# Patient Record
Sex: FEMALE | Race: WHITE | NOT HISPANIC OR LATINO | Employment: UNEMPLOYED | URBAN - METROPOLITAN AREA
[De-identification: names, ages, dates, MRNs, and addresses within clinical notes are randomized per-mention and may not be internally consistent; named-entity substitution may affect disease eponyms.]

---

## 2018-10-19 ENCOUNTER — CONVERSION ENCOUNTER (OUTPATIENT)
Dept: GENERAL RADIOLOGY | Facility: HOSPITAL | Age: 33
End: 2018-10-19

## 2020-04-22 ENCOUNTER — HOSPITAL ENCOUNTER (OUTPATIENT)
Dept: GENERAL RADIOLOGY | Facility: HOSPITAL | Age: 35
Discharge: HOME | End: 2020-04-22
Attending: NURSE PRACTITIONER

## 2020-09-15 ENCOUNTER — HOSPITAL ENCOUNTER (OUTPATIENT)
Dept: URGENT CARE | Facility: CLINIC | Age: 35
Discharge: HOME | End: 2020-09-15

## 2020-09-17 LAB — BACTERIA SPEC AEROBE CULT: NORMAL

## 2021-12-02 ENCOUNTER — TRANSCRIBE ORDERS (OUTPATIENT)
Dept: ADMINISTRATIVE | Facility: HOSPITAL | Age: 36
End: 2021-12-02

## 2021-12-02 DIAGNOSIS — N63.20 LEFT BREAST LUMP: ICD-10-CM

## 2021-12-02 DIAGNOSIS — Z12.31 BREAST CANCER SCREENING BY MAMMOGRAM: Primary | ICD-10-CM

## 2022-01-14 ENCOUNTER — TRANSCRIBE ORDERS (OUTPATIENT)
Dept: ADMINISTRATIVE | Facility: HOSPITAL | Age: 37
End: 2022-01-14

## 2022-01-14 ENCOUNTER — HOSPITAL ENCOUNTER (OUTPATIENT)
Dept: MAMMOGRAPHY | Facility: HOSPITAL | Age: 37
Discharge: HOME | End: 2022-01-14

## 2022-01-14 ENCOUNTER — HOSPITAL ENCOUNTER (OUTPATIENT)
Dept: ULTRASOUND IMAGING | Facility: HOSPITAL | Age: 37
Discharge: HOME | End: 2022-01-14

## 2022-01-14 DIAGNOSIS — N63.10 BREAST MASS, RIGHT: Primary | ICD-10-CM

## 2022-01-14 DIAGNOSIS — Z12.31 BREAST CANCER SCREENING BY MAMMOGRAM: ICD-10-CM

## 2022-01-14 DIAGNOSIS — N63.20 LEFT BREAST LUMP: ICD-10-CM

## 2022-01-14 DIAGNOSIS — N63.20 LEFT BREAST MASS: Primary | ICD-10-CM

## 2022-01-14 PROCEDURE — 77066 DX MAMMO INCL CAD BI: CPT

## 2022-01-14 PROCEDURE — G0279 TOMOSYNTHESIS, MAMMO: HCPCS

## 2022-01-14 PROCEDURE — 76642 ULTRASOUND BREAST LIMITED: CPT

## 2022-01-25 ENCOUNTER — HOSPITAL ENCOUNTER (EMERGENCY)
Facility: HOSPITAL | Age: 37
Discharge: HOME | End: 2022-01-25
Attending: EMERGENCY MEDICINE | Admitting: EMERGENCY MEDICINE

## 2022-01-25 ENCOUNTER — APPOINTMENT (OUTPATIENT)
Dept: GENERAL RADIOLOGY | Facility: HOSPITAL | Age: 37
End: 2022-01-25

## 2022-01-25 VITALS
RESPIRATION RATE: 18 BRPM | HEART RATE: 67 BPM | SYSTOLIC BLOOD PRESSURE: 115 MMHG | OXYGEN SATURATION: 96 % | TEMPERATURE: 98.3 F | WEIGHT: 142.2 LBS | DIASTOLIC BLOOD PRESSURE: 79 MMHG | BODY MASS INDEX: 23.69 KG/M2 | HEIGHT: 65 IN

## 2022-01-25 DIAGNOSIS — B34.9 VIRAL SYNDROME: Primary | ICD-10-CM

## 2022-01-25 LAB
BACTERIA UR QL AUTO: ABNORMAL /HPF
BILIRUB UR QL STRIP: NEGATIVE
CLARITY UR: CLEAR
COLOR UR: YELLOW
FLUAV AG NPH QL: NEGATIVE
FLUBV AG NPH QL IA: NEGATIVE
GLUCOSE UR STRIP-MCNC: NEGATIVE MG/DL
HGB UR QL STRIP.AUTO: ABNORMAL
HOLD SPECIMEN: NORMAL
HOLD SPECIMEN: NORMAL
HYALINE CASTS UR QL AUTO: ABNORMAL /LPF
KETONES UR QL STRIP: NEGATIVE
LEUKOCYTE ESTERASE UR QL STRIP.AUTO: NEGATIVE
NITRITE UR QL STRIP: NEGATIVE
PH UR STRIP.AUTO: 6.5 [PH] (ref 5–8)
PROT UR QL STRIP: NEGATIVE
RBC # UR STRIP: ABNORMAL /HPF
REF LAB TEST METHOD: ABNORMAL
S PYO AG THROAT QL: NEGATIVE
SP GR UR STRIP: 1.01 (ref 1–1.03)
SQUAMOUS #/AREA URNS HPF: ABNORMAL /HPF
UROBILINOGEN UR QL STRIP: ABNORMAL
WBC # UR STRIP: ABNORMAL /HPF
WHOLE BLOOD HOLD SPECIMEN: NORMAL
WHOLE BLOOD HOLD SPECIMEN: NORMAL

## 2022-01-25 PROCEDURE — 71045 X-RAY EXAM CHEST 1 VIEW: CPT

## 2022-01-25 PROCEDURE — 87804 INFLUENZA ASSAY W/OPTIC: CPT | Performed by: NURSE PRACTITIONER

## 2022-01-25 PROCEDURE — 81001 URINALYSIS AUTO W/SCOPE: CPT | Performed by: NURSE PRACTITIONER

## 2022-01-25 PROCEDURE — U0004 COV-19 TEST NON-CDC HGH THRU: HCPCS | Performed by: NURSE PRACTITIONER

## 2022-01-25 PROCEDURE — 36415 COLL VENOUS BLD VENIPUNCTURE: CPT

## 2022-01-25 PROCEDURE — 99283 EMERGENCY DEPT VISIT LOW MDM: CPT

## 2022-01-25 PROCEDURE — 87880 STREP A ASSAY W/OPTIC: CPT

## 2022-01-25 PROCEDURE — 87081 CULTURE SCREEN ONLY: CPT | Performed by: EMERGENCY MEDICINE

## 2022-01-25 RX ORDER — BROMPHENIRAMINE MALEATE, PSEUDOEPHEDRINE HYDROCHLORIDE, AND DEXTROMETHORPHAN HYDROBROMIDE 2; 30; 10 MG/5ML; MG/5ML; MG/5ML
10 SYRUP ORAL 4 TIMES DAILY PRN
Qty: 473 ML | Refills: 0 | Status: SHIPPED | OUTPATIENT
Start: 2022-01-25 | End: 2022-09-16

## 2022-01-25 RX ORDER — TRAMADOL HYDROCHLORIDE 50 MG/1
50 TABLET ORAL ONCE
Status: COMPLETED | OUTPATIENT
Start: 2022-01-25 | End: 2022-01-25

## 2022-01-25 RX ORDER — SPIRONOLACTONE 100 MG/1
100 TABLET, FILM COATED ORAL EVERY 24 HOURS
COMMUNITY
End: 2023-04-05

## 2022-01-25 RX ORDER — IBUPROFEN 600 MG/1
600 TABLET ORAL EVERY 8 HOURS PRN
Qty: 30 TABLET | Refills: 0 | Status: SHIPPED | OUTPATIENT
Start: 2022-01-25 | End: 2022-09-16

## 2022-01-25 RX ORDER — TRAMADOL HYDROCHLORIDE 50 MG/1
50 TABLET ORAL ONCE
Status: CANCELLED | OUTPATIENT
Start: 2022-01-25 | End: 2022-01-25

## 2022-01-25 RX ORDER — SODIUM CHLORIDE 0.9 % (FLUSH) 0.9 %
10 SYRINGE (ML) INJECTION AS NEEDED
Status: DISCONTINUED | OUTPATIENT
Start: 2022-01-25 | End: 2022-01-26 | Stop reason: HOSPADM

## 2022-01-25 RX ADMIN — TRAMADOL HYDROCHLORIDE 50 MG: 50 TABLET ORAL at 21:08

## 2022-01-26 LAB — SARS-COV-2 RNA PNL SPEC NAA+PROBE: DETECTED

## 2022-01-26 NOTE — DISCHARGE INSTRUCTIONS
All of your testing here today was negative for any acute findings.  Your symptoms are likely viral so just requires symptomatic treatment.  Covid test is pending and you will be notified if you get a positive result.  All results may be viewed at [facility] K121 online  Medications as prescribed  Rest  Alternate Tylenol/motrin for pain or fever  Drink plenty fluids  Follow up with pcp as needed  Go to ED for soa, or worsening symptoms

## 2022-01-27 ENCOUNTER — TRANSCRIBE ORDERS (OUTPATIENT)
Dept: ADMINISTRATIVE | Facility: HOSPITAL | Age: 37
End: 2022-01-27

## 2022-01-27 DIAGNOSIS — N63.20 LEFT BREAST MASS: Primary | ICD-10-CM

## 2022-01-27 LAB — BACTERIA SPEC AEROBE CULT: NORMAL

## 2022-02-01 ENCOUNTER — HOSPITAL ENCOUNTER (OUTPATIENT)
Dept: MAMMOGRAPHY | Facility: HOSPITAL | Age: 37
Discharge: HOME | End: 2022-02-01

## 2022-02-01 ENCOUNTER — DOCUMENTATION (OUTPATIENT)
Dept: MAMMOGRAPHY | Facility: HOSPITAL | Age: 37
End: 2022-02-01

## 2022-02-01 DIAGNOSIS — N63.20 LEFT BREAST MASS: ICD-10-CM

## 2022-02-01 PROCEDURE — 88305 TISSUE EXAM BY PATHOLOGIST: CPT | Performed by: PHYSICIAN ASSISTANT

## 2022-02-01 PROCEDURE — 0 LIDOCAINE 1 % SOLUTION: Performed by: PHYSICIAN ASSISTANT

## 2022-02-01 RX ORDER — LIDOCAINE HYDROCHLORIDE 10 MG/ML
10 INJECTION, SOLUTION INFILTRATION; PERINEURAL ONCE
Status: COMPLETED | OUTPATIENT
Start: 2022-02-01 | End: 2022-02-01

## 2022-02-01 RX ORDER — LIDOCAINE HYDROCHLORIDE AND EPINEPHRINE 10; 10 MG/ML; UG/ML
10 INJECTION, SOLUTION INFILTRATION; PERINEURAL ONCE
Status: COMPLETED | OUTPATIENT
Start: 2022-02-01 | End: 2022-02-01

## 2022-02-01 RX ADMIN — LIDOCAINE HYDROCHLORIDE 10 ML: 10 INJECTION, SOLUTION INFILTRATION; PERINEURAL at 14:04

## 2022-02-01 RX ADMIN — LIDOCAINE HYDROCHLORIDE,EPINEPHRINE BITARTRATE 10 ML: 10; .01 INJECTION, SOLUTION INFILTRATION; PERINEURAL at 14:04

## 2022-02-01 RX ADMIN — LIDOCAINE HYDROCHLORIDE 10 ML: 10 INJECTION, SOLUTION INFILTRATION; PERINEURAL at 14:06

## 2022-02-01 RX ADMIN — LIDOCAINE HYDROCHLORIDE,EPINEPHRINE BITARTRATE 10 ML: 10; .01 INJECTION, SOLUTION INFILTRATION; PERINEURAL at 14:06

## 2022-02-02 LAB
CYTO UR: NORMAL
LAB AP CASE REPORT: NORMAL
LAB AP CLINICAL INFORMATION: NORMAL
PATH REPORT.FINAL DX SPEC: NORMAL
PATH REPORT.GROSS SPEC: NORMAL

## 2022-02-03 ENCOUNTER — DOCUMENTATION (OUTPATIENT)
Dept: MAMMOGRAPHY | Facility: HOSPITAL | Age: 37
End: 2022-02-03

## 2022-02-03 NOTE — PROGRESS NOTES
PT CALLED AND ASKED ABOUT HER PATHOLOGY RESULTS THAT SHE SAW ON HER MYCHART. I EXPLAINED WHAT IT SAID AND REPORTED TO HER THAT THE RADIOLOGIST WILL CONFIRM THE DIAGNOSIS WITH AN ADDENDUM AND WILL GIVE RECOMMENDATIONS AND SHE V/U. I ADVISED HER TO F/U WITH HER DOCTOR AND SHE V/U

## 2022-02-08 NOTE — PROGRESS NOTES
S/W PATIENT AFTER HER BIOPSY ON 2/1/22 AND DISCUSSED DISCHARGE INSTRUCTIONS WITH ICE PACK FOR PAIN CONTROL AND PT V/U. PT REPORTED THAT HER LAST MAMMOGRAM WAS SEVERAL YEARS AGO. SHE STATED THAT SHE DID FEEL SOMETHING AND THAT IS WHAT PROMPTED HER TO GET A MAMMOGRAM. THE ONLY COMPLAINT SHE HAS WITH HER BREAST IS SORENESS. PT REPORTED THAT SHE WAS 19 WITH HER FIRST CHILD AND 14 WITH FIRST PERIOD AND HAS NOT GONE THROUGH MENOPAUSE. PT REPORTED THAT HER 2ND COUSIN HAD BREAST CANCER IN HER 50S, MOM HAD GALLBLADDER CANCER, PATERNAL GRANDFATHER HAD COLON CANCER AND MATERNAL GRANDFATHER HAD BONE CANCER. I GAVE PT MY CONTACT INFORMATION AND ENCOURAGED HER TO CALL WITH ANY QUESTIONS OR CONCERNS AND SHE V/U

## 2022-02-23 ENCOUNTER — APPOINTMENT (OUTPATIENT)
Dept: ULTRASOUND IMAGING | Facility: HOSPITAL | Age: 37
End: 2022-02-23

## 2022-02-23 ENCOUNTER — APPOINTMENT (OUTPATIENT)
Dept: MAMMOGRAPHY | Facility: HOSPITAL | Age: 37
End: 2022-02-23

## 2022-03-22 ENCOUNTER — TRANSCRIBE ORDERS (OUTPATIENT)
Dept: ADMINISTRATIVE | Facility: HOSPITAL | Age: 37
End: 2022-03-22

## 2022-03-22 DIAGNOSIS — N63.20 LEFT BREAST LUMP: Primary | ICD-10-CM

## 2022-03-22 DIAGNOSIS — N63.20 MASS OF LEFT BREAST: Primary | ICD-10-CM

## 2022-06-06 ENCOUNTER — APPOINTMENT (OUTPATIENT)
Dept: ULTRASOUND IMAGING | Facility: HOSPITAL | Age: 37
End: 2022-06-06

## 2022-06-30 ENCOUNTER — DOCUMENTATION (OUTPATIENT)
Dept: MAMMOGRAPHY | Facility: HOSPITAL | Age: 37
End: 2022-06-30

## 2022-07-06 ENCOUNTER — APPOINTMENT (OUTPATIENT)
Dept: ULTRASOUND IMAGING | Facility: HOSPITAL | Age: 37
End: 2022-07-06

## 2022-07-12 ENCOUNTER — HOSPITAL ENCOUNTER (OUTPATIENT)
Dept: MAMMOGRAPHY | Facility: HOSPITAL | Age: 37
End: 2022-07-12

## 2022-07-12 ENCOUNTER — APPOINTMENT (OUTPATIENT)
Dept: ULTRASOUND IMAGING | Facility: HOSPITAL | Age: 37
End: 2022-07-12

## 2022-07-27 ENCOUNTER — HOSPITAL ENCOUNTER (OUTPATIENT)
Dept: MAMMOGRAPHY | Facility: HOSPITAL | Age: 37
Discharge: HOME | End: 2022-07-27

## 2022-07-27 ENCOUNTER — HOSPITAL ENCOUNTER (OUTPATIENT)
Dept: ULTRASOUND IMAGING | Facility: HOSPITAL | Age: 37
Discharge: HOME | End: 2022-07-27

## 2022-07-27 DIAGNOSIS — N63.20 LEFT BREAST LUMP: ICD-10-CM

## 2022-07-27 DIAGNOSIS — N63.20 LEFT BREAST MASS: ICD-10-CM

## 2022-07-27 PROCEDURE — 76642 ULTRASOUND BREAST LIMITED: CPT

## 2022-07-27 PROCEDURE — 77065 DX MAMMO INCL CAD UNI: CPT

## 2022-07-27 PROCEDURE — G0279 TOMOSYNTHESIS, MAMMO: HCPCS

## 2022-08-01 ENCOUNTER — APPOINTMENT (OUTPATIENT)
Dept: ULTRASOUND IMAGING | Facility: HOSPITAL | Age: 37
End: 2022-08-01

## 2022-08-01 ENCOUNTER — APPOINTMENT (OUTPATIENT)
Dept: MAMMOGRAPHY | Facility: HOSPITAL | Age: 37
End: 2022-08-01

## 2022-09-08 ENCOUNTER — OFFICE VISIT (OUTPATIENT)
Dept: SURGERY | Facility: CLINIC | Age: 37
End: 2022-09-08

## 2022-09-08 VITALS — RESPIRATION RATE: 16 BRPM | BODY MASS INDEX: 25.33 KG/M2 | WEIGHT: 152 LBS | HEIGHT: 65 IN

## 2022-09-08 DIAGNOSIS — N63.20 MASS OF LEFT BREAST, UNSPECIFIED QUADRANT: Primary | ICD-10-CM

## 2022-09-08 PROCEDURE — 99204 OFFICE O/P NEW MOD 45 MIN: CPT | Performed by: SURGERY

## 2022-09-08 NOTE — H&P (VIEW-ONLY)
Chief Complaint: Breast Mass    Subjective         History of Present Illness  Andreina Barnes is a 37 y.o. female presents to [facility] SURGERY to be seen for left breast mass that has been enlarging on imaging and is painful to the patient.  Her imaging is shown below:      Narrative & Impression   PROCEDURE:  MAMMO DIAGNOSTIC DIGITAL TOMOSYNTHESIS LEFT W CAD, 7/27/2022, 14:16  US BREAST LEFT LIMITED, 7/27/2022, 14:11     INDICATIONS:  36-year-old woman status post bilateral breast ultrasound in February 2022 that showed bilateral fibroadenomas.  She reports that her left breast lump feels larger and is more painful.  She was also recommended for short-term follow-up left breast cystic mass as recommended on   1/14/2022 report.     FINDINGS:          Mammogram:    Corresponding to the patient's symptom of left breast lump, the oval, circumscribed, oval mass in   the upper-outer left breast, middle to posterior depth, measures 1.8 cm, previously 1.7 cm.    Oval, circumscribed, oval mass in the upper inner left breast, anterior depth, is stable in size at   8 mm.      Ultrasound:    Corresponding to the patient's symptom of enlarging, painful left breast lump at 12:00, 8 cm from   the nipple, there is a cluster of 3 oval, circumscribed, hypoechoic, solid masses that measure a   total of 1.6 x 0.9 x 1.0 cm, previously 1.2 x 0.7 x 1.2 cm.   Corresponding to the smaller mammographic mass, there is a benign cyst that measures 7 x 4 x 5 mm at 10:00, 4 cm from the nipple, previously 8 x 5 x 5 mm.      IMPRESSION:  Corresponding to the patient's symptom of enlarging, painful left breast lump at 12:00, 8 cm from   the nipple, the patient's biopsy-proven fibroadenoma has mildly increased in size.  Given the   patient's symptoms, recommend surgical consultation.     Stable benign left breast cyst.     I discussed results with the patient following the exam.     RECOMMENDATION(S):              "  CLINICAL EVALUATION.       SURGICAL CONSULTATION.           Objective     Past Medical History:   Diagnosis Date   • Hormone imbalance        Past Surgical History:   Procedure Laterality Date   • APPENDECTOMY     • TONSILLECTOMY           Current Outpatient Medications:   •  spironolactone (ALDACTONE) 100 MG tablet, Take 100 mg by mouth Daily., Disp: , Rfl:   •  brompheniramine-pseudoephedrine-DM 30-2-10 MG/5ML syrup, Take 10 mL by mouth 4 (Four) Times a Day As Needed for Allergies., Disp: 473 mL, Rfl: 0  •  ibuprofen (ADVIL,MOTRIN) 600 MG tablet, Take 1 tablet by mouth Every 8 (Eight) Hours As Needed for Mild Pain ., Disp: 30 tablet, Rfl: 0    Allergies   Allergen Reactions   • Morphine Other (See Comments) and Unknown - High Severity   • Penicillin G Unknown - High Severity   • Penicillin V Other (See Comments)   • Amoxicillin Rash, Other (See Comments) and Unknown - High Severity     rash        History reviewed. No pertinent family history.    Social History     Socioeconomic History   • Marital status:    Tobacco Use   • Smoking status: Current Some Day Smoker   • Smokeless tobacco: Never Used   Vaping Use   • Vaping Use: Every day   • Substances: Nicotine   • Devices: Disposable   Substance and Sexual Activity   • Alcohol use: Never   • Drug use: Never   • Sexual activity: Defer       Vital Signs:   Resp 16   Ht 165.1 cm (65\")   Wt 68.9 kg (152 lb)   BMI 25.29 kg/m²    Review of Systems    Physical Exam  Vitals and nursing note reviewed.   Constitutional:       Appearance: Normal appearance.   HENT:      Head: Normocephalic and atraumatic.   Eyes:      Extraocular Movements: Extraocular movements intact.      Pupils: Pupils are equal, round, and reactive to light.   Cardiovascular:      Pulses: Normal pulses.   Pulmonary:      Effort: Pulmonary effort is normal. No accessory muscle usage or respiratory distress.   Chest:   Breasts:      Right: Normal. No inverted nipple, nipple discharge, skin " change, axillary adenopathy or supraclavicular adenopathy.      Left: Mass present. No inverted nipple, nipple discharge, skin change, axillary adenopathy or supraclavicular adenopathy.       Abdominal:      General: Abdomen is flat.      Palpations: Abdomen is soft.      Tenderness: There is no abdominal tenderness. There is no guarding.   Musculoskeletal:         General: No swelling, tenderness or deformity.      Cervical back: Neck supple.   Lymphadenopathy:      Upper Body:      Right upper body: No supraclavicular or axillary adenopathy.      Left upper body: No supraclavicular or axillary adenopathy.   Skin:     General: Skin is warm and dry.   Neurological:      General: No focal deficit present.      Mental Status: She is alert and oriented to person, place, and time.   Psychiatric:         Mood and Affect: Mood normal.         Thought Content: Thought content normal.          Result Review :               Assessment and Plan    Diagnoses and all orders for this visit:    1. Mass of left breast, unspecified quadrant (Primary)  -     Case Request; Standing  -     Case Request    Will plan on left breast mass excision on 9/20/22    Follow Up   Return for Next scheduled followup after surgery.  Patient was given instructions and counseling regarding her condition or for health maintenance advice. Please see specific information pulled into the AVS if appropriate.         This document has been electronically signed by Savana Gaitan MD  September 8, 2022 11:11 EDT

## 2022-09-08 NOTE — PROGRESS NOTES
Chief Complaint: Breast Mass    Subjective         History of Present Illness  Andreina Barnes is a 37 y.o. female presents to [facility] GENER[facility] SURGERY to be seen for left breast mass that has been enlarging on imaging and is painful to the patient.  Her imaging is shown below:      Narrative & Impression   PROCEDURE:  MAMMO DIAGNOSTIC DIGITAL TOMOSYNTHESIS LEFT W CAD, 7/27/2022, 14:16  US BREAST LEFT LIMITED, 7/27/2022, 14:11     INDICATIONS:  36-year-old woman status post bilateral breast ultrasound in February 2022 that showed bilateral fibroadenomas.  She reports that her left breast lump feels larger and is more painful.  She was also recommended for short-term follow-up left breast cystic mass as recommended on   1/14/2022 report.     FINDINGS:          Mammogram:    Corresponding to the patient's symptom of left breast lump, the oval, circumscribed, oval mass in   the upper-outer left breast, middle to posterior depth, measures 1.8 cm, previously 1.7 cm.    Oval, circumscribed, oval mass in the upper inner left breast, anterior depth, is stable in size at   8 mm.      Ultrasound:    Corresponding to the patient's symptom of enlarging, painful left breast lump at 12:00, 8 cm from   the nipple, there is a cluster of 3 oval, circumscribed, hypoechoic, solid masses that measure a   total of 1.6 x 0.9 x 1.0 cm, previously 1.2 x 0.7 x 1.2 cm.   Corresponding to the smaller mammographic mass, there is a benign cyst that measures 7 x 4 x 5 mm at 10:00, 4 cm from the nipple, previously 8 x 5 x 5 mm.      IMPRESSION:  Corresponding to the patient's symptom of enlarging, painful left breast lump at 12:00, 8 cm from   the nipple, the patient's biopsy-proven fibroadenoma has mildly increased in size.  Given the   patient's symptoms, recommend surgical consultation.     Stable benign left breast cyst.     I discussed results with the patient following the exam.     RECOMMENDATION(S):              "  CLINICAL EVALUATION.       SURGICAL CONSULTATION.           Objective     Past Medical History:   Diagnosis Date   • Hormone imbalance        Past Surgical History:   Procedure Laterality Date   • APPENDECTOMY     • TONSILLECTOMY           Current Outpatient Medications:   •  spironolactone (ALDACTONE) 100 MG tablet, Take 100 mg by mouth Daily., Disp: , Rfl:   •  brompheniramine-pseudoephedrine-DM 30-2-10 MG/5ML syrup, Take 10 mL by mouth 4 (Four) Times a Day As Needed for Allergies., Disp: 473 mL, Rfl: 0  •  ibuprofen (ADVIL,MOTRIN) 600 MG tablet, Take 1 tablet by mouth Every 8 (Eight) Hours As Needed for Mild Pain ., Disp: 30 tablet, Rfl: 0    Allergies   Allergen Reactions   • Morphine Other (See Comments) and Unknown - High Severity   • Penicillin G Unknown - High Severity   • Penicillin V Other (See Comments)   • Amoxicillin Rash, Other (See Comments) and Unknown - High Severity     rash        History reviewed. No pertinent family history.    Social History     Socioeconomic History   • Marital status:    Tobacco Use   • Smoking status: Current Some Day Smoker   • Smokeless tobacco: Never Used   Vaping Use   • Vaping Use: Every day   • Substances: Nicotine   • Devices: Disposable   Substance and Sexual Activity   • Alcohol use: Never   • Drug use: Never   • Sexual activity: Defer       Vital Signs:   Resp 16   Ht 165.1 cm (65\")   Wt 68.9 kg (152 lb)   BMI 25.29 kg/m²    Review of Systems    Physical Exam  Vitals and nursing note reviewed.   Constitutional:       Appearance: Normal appearance.   HENT:      Head: Normocephalic and atraumatic.   Eyes:      Extraocular Movements: Extraocular movements intact.      Pupils: Pupils are equal, round, and reactive to light.   Cardiovascular:      Pulses: Normal pulses.   Pulmonary:      Effort: Pulmonary effort is normal. No accessory muscle usage or respiratory distress.   Chest:   Breasts:      Right: Normal. No inverted nipple, nipple discharge, skin " change, axillary adenopathy or supraclavicular adenopathy.      Left: Mass present. No inverted nipple, nipple discharge, skin change, axillary adenopathy or supraclavicular adenopathy.       Abdominal:      General: Abdomen is flat.      Palpations: Abdomen is soft.      Tenderness: There is no abdominal tenderness. There is no guarding.   Musculoskeletal:         General: No swelling, tenderness or deformity.      Cervical back: Neck supple.   Lymphadenopathy:      Upper Body:      Right upper body: No supraclavicular or axillary adenopathy.      Left upper body: No supraclavicular or axillary adenopathy.   Skin:     General: Skin is warm and dry.   Neurological:      General: No focal deficit present.      Mental Status: She is alert and oriented to person, place, and time.   Psychiatric:         Mood and Affect: Mood normal.         Thought Content: Thought content normal.          Result Review :               Assessment and Plan    Diagnoses and all orders for this visit:    1. Mass of left breast, unspecified quadrant (Primary)  -     Case Request; Standing  -     Case Request    Will plan on left breast mass excision on 9/20/22    Follow Up   Return for Next scheduled followup after surgery.  Patient was given instructions and counseling regarding her condition or for health maintenance advice. Please see specific information pulled into the AVS if appropriate.         This document has been electronically signed by Savana Gaitan MD  September 8, 2022 11:11 EDT

## 2022-09-16 NOTE — PRE-PROCEDURE INSTRUCTIONS
PATIENT INSTRUCTED TO BE:    - NPO AFTER MIDNIGHT EXCEPT CAN HAVE CLEAR LIQUIDS 2 HOURS PRIOR TO SURGERY ARRIVAL TIME     - TO HOLD ALL VITAMINS, SUPPLEMENTS, NSAIDS FOR ONE WEEK PRIOR TO THEIR SURGICAL PROCEDURE    - INSTRUCTED PT TO USE SURGICAL SOAP 1 TIME THE NIGHT PRIOR TO SURGERY OR THE AM OF SURGERY.   USE SOAP FROM NECK TO TOES AVOID THEIR FACE, HAIR, AND PRIVATE PARTS. INSTRUCTED NO LOTIONS, JEWELRY, PIERCINGS, OR DEODORANT DAY OF SURGERY    - IF DIABETIC, CHECK BLOOD GLUCOSE IF LESS THAN 70 CALL PREOP AREA -AM OF SURGERY     - INSTRUCTED TO TAKE THE FOLLOWING MEDICATIONS THE DAY OF SURGERY:         NONE     PATIENT VERBALIZED UNDERSTANDING

## 2022-09-20 ENCOUNTER — ANESTHESIA (OUTPATIENT)
Dept: PERIOP | Facility: HOSPITAL | Age: 37
End: 2022-09-20

## 2022-09-20 ENCOUNTER — HOSPITAL ENCOUNTER (OUTPATIENT)
Facility: HOSPITAL | Age: 37
Setting detail: HOSPITAL OUTPATIENT SURGERY
Discharge: HOME | End: 2022-09-20
Attending: SURGERY | Admitting: SURGERY

## 2022-09-20 ENCOUNTER — ANESTHESIA EVENT (OUTPATIENT)
Dept: PERIOP | Facility: HOSPITAL | Age: 37
End: 2022-09-20

## 2022-09-20 VITALS
RESPIRATION RATE: 20 BRPM | HEART RATE: 88 BPM | DIASTOLIC BLOOD PRESSURE: 80 MMHG | SYSTOLIC BLOOD PRESSURE: 134 MMHG | BODY MASS INDEX: 25.71 KG/M2 | TEMPERATURE: 97.1 F | OXYGEN SATURATION: 99 % | WEIGHT: 154.32 LBS | HEIGHT: 65 IN

## 2022-09-20 DIAGNOSIS — N63.20 MASS OF LEFT BREAST, UNSPECIFIED QUADRANT: ICD-10-CM

## 2022-09-20 LAB — B-HCG UR QL: NEGATIVE

## 2022-09-20 PROCEDURE — 25010000002 KETOROLAC TROMETHAMINE PER 15 MG: Performed by: NURSE ANESTHETIST, CERTIFIED REGISTERED

## 2022-09-20 PROCEDURE — 25010000002 FENTANYL CITRATE (PF) 50 MCG/ML SOLUTION: Performed by: NURSE ANESTHETIST, CERTIFIED REGISTERED

## 2022-09-20 PROCEDURE — 25010000002 PROPOFOL 10 MG/ML EMULSION: Performed by: NURSE ANESTHETIST, CERTIFIED REGISTERED

## 2022-09-20 PROCEDURE — 88305 TISSUE EXAM BY PATHOLOGIST: CPT | Performed by: SURGERY

## 2022-09-20 PROCEDURE — 25010000002 GENTAMICIN PER 80 MG: Performed by: SURGERY

## 2022-09-20 PROCEDURE — 25010000002 DEXAMETHASONE PER 1 MG: Performed by: NURSE ANESTHETIST, CERTIFIED REGISTERED

## 2022-09-20 PROCEDURE — 81025 URINE PREGNANCY TEST: CPT | Performed by: ANESTHESIOLOGY

## 2022-09-20 PROCEDURE — 25010000002 MIDAZOLAM PER 1 MG: Performed by: ANESTHESIOLOGY

## 2022-09-20 PROCEDURE — 25010000002 ONDANSETRON PER 1 MG: Performed by: NURSE ANESTHETIST, CERTIFIED REGISTERED

## 2022-09-20 PROCEDURE — C1889 IMPLANT/INSERT DEVICE, NOC: HCPCS | Performed by: SURGERY

## 2022-09-20 PROCEDURE — 19120 REMOVAL OF BREAST LESION: CPT | Performed by: SURGERY

## 2022-09-20 DEVICE — LIGACLIP MCA MULTIPLE CLIP APPLIERS, 20 MEDIUM CLIPS
Type: IMPLANTABLE DEVICE | Site: BREAST | Status: FUNCTIONAL
Brand: LIGACLIP

## 2022-09-20 DEVICE — SEAL HEMO SURG ARISTA/AH ABS/PWDR 3GM: Type: IMPLANTABLE DEVICE | Site: BREAST | Status: FUNCTIONAL

## 2022-09-20 RX ORDER — SODIUM CHLORIDE, SODIUM LACTATE, POTASSIUM CHLORIDE, CALCIUM CHLORIDE 600; 310; 30; 20 MG/100ML; MG/100ML; MG/100ML; MG/100ML
9 INJECTION, SOLUTION INTRAVENOUS CONTINUOUS PRN
Status: DISCONTINUED | OUTPATIENT
Start: 2022-09-20 | End: 2022-09-20 | Stop reason: HOSPADM

## 2022-09-20 RX ORDER — GLYCOPYRROLATE 0.2 MG/ML
INJECTION INTRAMUSCULAR; INTRAVENOUS AS NEEDED
Status: DISCONTINUED | OUTPATIENT
Start: 2022-09-20 | End: 2022-09-20 | Stop reason: SURG

## 2022-09-20 RX ORDER — PHENYLEPHRINE HCL IN 0.9% NACL 1 MG/10 ML
SYRINGE (ML) INTRAVENOUS AS NEEDED
Status: DISCONTINUED | OUTPATIENT
Start: 2022-09-20 | End: 2022-09-20 | Stop reason: SURG

## 2022-09-20 RX ORDER — LIDOCAINE HYDROCHLORIDE 20 MG/ML
INJECTION, SOLUTION EPIDURAL; INFILTRATION; INTRACAUDAL; PERINEURAL AS NEEDED
Status: DISCONTINUED | OUTPATIENT
Start: 2022-09-20 | End: 2022-09-20 | Stop reason: SURG

## 2022-09-20 RX ORDER — DEXAMETHASONE SODIUM PHOSPHATE 4 MG/ML
INJECTION, SOLUTION INTRA-ARTICULAR; INTRALESIONAL; INTRAMUSCULAR; INTRAVENOUS; SOFT TISSUE AS NEEDED
Status: DISCONTINUED | OUTPATIENT
Start: 2022-09-20 | End: 2022-09-20 | Stop reason: SURG

## 2022-09-20 RX ORDER — ONDANSETRON 2 MG/ML
INJECTION INTRAMUSCULAR; INTRAVENOUS AS NEEDED
Status: DISCONTINUED | OUTPATIENT
Start: 2022-09-20 | End: 2022-09-20 | Stop reason: SURG

## 2022-09-20 RX ORDER — GLYCOPYRROLATE 0.2 MG/ML
0.2 INJECTION INTRAMUSCULAR; INTRAVENOUS
Status: COMPLETED | OUTPATIENT
Start: 2022-09-20 | End: 2022-09-20

## 2022-09-20 RX ORDER — KETOROLAC TROMETHAMINE 30 MG/ML
INJECTION, SOLUTION INTRAMUSCULAR; INTRAVENOUS AS NEEDED
Status: DISCONTINUED | OUTPATIENT
Start: 2022-09-20 | End: 2022-09-20 | Stop reason: SURG

## 2022-09-20 RX ORDER — ACETAMINOPHEN 500 MG
1000 TABLET ORAL ONCE
Status: COMPLETED | OUTPATIENT
Start: 2022-09-20 | End: 2022-09-20

## 2022-09-20 RX ORDER — PROMETHAZINE HYDROCHLORIDE 12.5 MG/1
25 TABLET ORAL ONCE AS NEEDED
Status: DISCONTINUED | OUTPATIENT
Start: 2022-09-20 | End: 2022-09-20 | Stop reason: HOSPADM

## 2022-09-20 RX ORDER — PROMETHAZINE HYDROCHLORIDE 25 MG/1
25 SUPPOSITORY RECTAL ONCE AS NEEDED
Status: DISCONTINUED | OUTPATIENT
Start: 2022-09-20 | End: 2022-09-20 | Stop reason: HOSPADM

## 2022-09-20 RX ORDER — DEXMEDETOMIDINE HYDROCHLORIDE 100 UG/ML
INJECTION, SOLUTION INTRAVENOUS AS NEEDED
Status: DISCONTINUED | OUTPATIENT
Start: 2022-09-20 | End: 2022-09-20 | Stop reason: SURG

## 2022-09-20 RX ORDER — HYDROCODONE BITARTRATE AND ACETAMINOPHEN 5; 325 MG/1; MG/1
1-2 TABLET ORAL EVERY 4 HOURS PRN
Qty: 20 TABLET | Refills: 0 | Status: SHIPPED | OUTPATIENT
Start: 2022-09-20 | End: 2023-04-05

## 2022-09-20 RX ORDER — MIDAZOLAM HYDROCHLORIDE 1 MG/ML
2 INJECTION INTRAMUSCULAR; INTRAVENOUS ONCE
Status: COMPLETED | OUTPATIENT
Start: 2022-09-20 | End: 2022-09-20

## 2022-09-20 RX ORDER — PROPOFOL 10 MG/ML
VIAL (ML) INTRAVENOUS AS NEEDED
Status: DISCONTINUED | OUTPATIENT
Start: 2022-09-20 | End: 2022-09-20 | Stop reason: SURG

## 2022-09-20 RX ORDER — MAGNESIUM HYDROXIDE 1200 MG/15ML
LIQUID ORAL AS NEEDED
Status: DISCONTINUED | OUTPATIENT
Start: 2022-09-20 | End: 2022-09-20 | Stop reason: HOSPADM

## 2022-09-20 RX ORDER — ONDANSETRON 4 MG/1
4 TABLET, FILM COATED ORAL ONCE AS NEEDED
Status: DISCONTINUED | OUTPATIENT
Start: 2022-09-20 | End: 2022-09-20 | Stop reason: HOSPADM

## 2022-09-20 RX ORDER — CLINDAMYCIN PHOSPHATE 900 MG/50ML
900 INJECTION INTRAVENOUS ONCE
Status: DISCONTINUED | OUTPATIENT
Start: 2022-09-20 | End: 2022-09-20 | Stop reason: HOSPADM

## 2022-09-20 RX ORDER — BUPIVACAINE HYDROCHLORIDE 2.5 MG/ML
INJECTION, SOLUTION EPIDURAL; INFILTRATION; INTRACAUDAL AS NEEDED
Status: DISCONTINUED | OUTPATIENT
Start: 2022-09-20 | End: 2022-09-20 | Stop reason: HOSPADM

## 2022-09-20 RX ORDER — GENTAMICIN SULFATE 40 MG/ML
INJECTION, SOLUTION INTRAMUSCULAR; INTRAVENOUS AS NEEDED
Status: DISCONTINUED | OUTPATIENT
Start: 2022-09-20 | End: 2022-09-20 | Stop reason: HOSPADM

## 2022-09-20 RX ORDER — FENTANYL CITRATE 50 UG/ML
INJECTION, SOLUTION INTRAMUSCULAR; INTRAVENOUS AS NEEDED
Status: DISCONTINUED | OUTPATIENT
Start: 2022-09-20 | End: 2022-09-20 | Stop reason: SURG

## 2022-09-20 RX ORDER — MEPERIDINE HYDROCHLORIDE 25 MG/ML
12.5 INJECTION INTRAMUSCULAR; INTRAVENOUS; SUBCUTANEOUS
Status: DISCONTINUED | OUTPATIENT
Start: 2022-09-20 | End: 2022-09-20 | Stop reason: HOSPADM

## 2022-09-20 RX ORDER — OXYCODONE HYDROCHLORIDE 5 MG/1
5 TABLET ORAL
Status: DISCONTINUED | OUTPATIENT
Start: 2022-09-20 | End: 2022-09-20 | Stop reason: HOSPADM

## 2022-09-20 RX ORDER — ONDANSETRON 2 MG/ML
4 INJECTION INTRAMUSCULAR; INTRAVENOUS ONCE AS NEEDED
Status: DISCONTINUED | OUTPATIENT
Start: 2022-09-20 | End: 2022-09-20 | Stop reason: HOSPADM

## 2022-09-20 RX ADMIN — Medication 50 MCG: at 07:53

## 2022-09-20 RX ADMIN — MIDAZOLAM HYDROCHLORIDE 2 MG: 1 INJECTION, SOLUTION INTRAMUSCULAR; INTRAVENOUS at 07:20

## 2022-09-20 RX ADMIN — DEXAMETHASONE SODIUM PHOSPHATE 4 MG: 4 INJECTION, SOLUTION INTRA-ARTICULAR; INTRALESIONAL; INTRAMUSCULAR; INTRAVENOUS; SOFT TISSUE at 07:37

## 2022-09-20 RX ADMIN — KETOROLAC TROMETHAMINE 30 MG: 30 INJECTION, SOLUTION INTRAMUSCULAR; INTRAVENOUS at 08:05

## 2022-09-20 RX ADMIN — DEXMEDETOMIDINE HYDROCHLORIDE 10 MCG: 100 INJECTION, SOLUTION, CONCENTRATE INTRAVENOUS at 07:54

## 2022-09-20 RX ADMIN — PROPOFOL 150 MG: 10 INJECTION, EMULSION INTRAVENOUS at 07:37

## 2022-09-20 RX ADMIN — ONDANSETRON 4 MG: 2 INJECTION INTRAMUSCULAR; INTRAVENOUS at 07:37

## 2022-09-20 RX ADMIN — FENTANYL CITRATE 50 MCG: 50 INJECTION, SOLUTION INTRAMUSCULAR; INTRAVENOUS at 07:48

## 2022-09-20 RX ADMIN — SODIUM CHLORIDE, POTASSIUM CHLORIDE, SODIUM LACTATE AND CALCIUM CHLORIDE 9 ML/HR: 600; 310; 30; 20 INJECTION, SOLUTION INTRAVENOUS at 06:55

## 2022-09-20 RX ADMIN — FENTANYL CITRATE 50 MCG: 50 INJECTION, SOLUTION INTRAMUSCULAR; INTRAVENOUS at 07:37

## 2022-09-20 RX ADMIN — GLYCOPYRROLATE 0.2 MG: 0.2 INJECTION INTRAMUSCULAR; INTRAVENOUS at 08:00

## 2022-09-20 RX ADMIN — LIDOCAINE HYDROCHLORIDE 40 MG: 20 INJECTION, SOLUTION EPIDURAL; INFILTRATION; INTRACAUDAL; PERINEURAL at 07:37

## 2022-09-20 RX ADMIN — ACETAMINOPHEN 1000 MG: 500 TABLET, FILM COATED ORAL at 06:55

## 2022-09-20 RX ADMIN — GLYCOPYRROLATE 0.2 MG: 0.2 INJECTION INTRAMUSCULAR; INTRAVENOUS at 07:20

## 2022-09-20 NOTE — OP NOTE
BREAST LUMPECTOMY  Procedure Report    Patient Name:  Andreina Barnes  YOB: 1985    Date of Surgery:  9/20/2022     Indications:  Breast mass    Pre-op Diagnosis:   Mass of left breast, unspecified quadrant [N63.20]       Post-Op Diagnosis Codes:     * Mass of left breast, unspecified quadrant [N63.20]    Procedure/CPT® Codes:  Procedure(s):  Excision of left breast mass    Staff:  Surgeon(s):  Savana Gaitan MD         Anesthesia: General    Estimated Blood Loss: minimal    Implants:    Implant Name Type Inv. Item Serial No.  Lot No. LRB No. Used Action   CLIPAPPLR M/ ENDO LIGACLIP 20CLP 11IN MD - [id number] Implant CLIPAPPLR M/ ENDO LIGACLIP 20CLP 11IN MD  [employer]  DIV [employer] J [employer] J [id number] Left 1 Implanted   SEAL HEMO SURG ROSA/AH ABS/PWDR 3GM - [id number] Implant SEAL HEMO SURG ROSA/AH ABS/PWDR 3GM  [employer] HEMOSTATIS [employer] [id number] Left 1 Implanted       Specimen:          Specimens     ID Source Type Tests Collected By Collected At Frozen?    A Breast, Left Tissue · TISSUE PATHOLOGY EXAM   Savana Gaitan MD 9/20/22 0802 No    Description: left breast mass    Comment: Short stitch superior, long stitch lateral              Findings: None    Complications: None    Description of Procedure:   The risks and benefits and treatment options were discussed with her. After informed consent was obtained, she was taken to the OR and placed supine on the table. An incision was made directly over the palpable mass and then skin flaps were raised anteriorly and the tissue was excised circumferentially around the mass. The specimen was removed and marked for orientation. It was about 2 cm in size.  The wound bed was irrigated. Hemostasis was achieved with electrocautery and clips. The deeper layers were closed with 2-0 Vicryl, the skin was closed with a 4.0 subcuticular stitch. The patietn tolerated the procedure well and will followup in 2  eder Gaitan MD     Date: 9/20/2022  Time: 08:15 EDT

## 2022-09-20 NOTE — DISCHARGE INSTRUCTIONS
DISCHARGE INSTRUCTIONS  [] Breast Biopsy  [] Lumpectomy  [] Lymph Node Dissection           For your surgery you had:  General anesthesia (you may have a sore throat for the first 24 hours)  IV sedation  Local anesthesia  Monitored anesthesia care    You have received an anesthesia medication today that can cause hormonal forms of birth control to be ineffective. You should use a different form of birth control (to prevent pregnancy) for 7 days.   You may experience dizziness, drowsiness, or light-headedness for several hours following surgery/procedure.  Do not stay alone today or tonight.  Limit your activity for 24 hours.  You should not drive, operate machinery, drink alcohol, or sign legally binding documents for 24 hours or while you are taking pain medication.  Resume your diet slowly.  Follow whatever special dietary instructions you may have been given by your doctor.  Last dose of pain medication was given at:   Tylenol at 0700am .  NOTIFY YOUR DOCTOR IF YOU EXPERIENCE ANY OF THE FOLLOWING:  Temperature greater than 101 degrees Fahrenheit  Shaking Chills  Redness or excessive drainage from incision  Nausea, vomiting and/or pain that is not controlled by prescribed medications  Increase in bleeding or bleeding that is excessive  Unable to urinate in 6 hours after surgery  If unable to reach your doctor, please go to the closest Emergency Room  You may begin dressing changes:     [] in 24 hours   [x] in 48 hours   [] Other:    You may remove your dressing on  .  You may shower  []tomorrow  [x] other  48 hrs   Ice pack for 24-48 hours.  Wear a bra for support.  Do not do any heavy lifting.  After your surgery you may notice some bruising.  This is normal.  Medications per physician instructions as indicated on Discharge Medication Information Sheet.  You should see   for follow-up care   on   .  Phone number:       SPECIAL INSTRUCTIONS:

## 2022-09-20 NOTE — ANESTHESIA POSTPROCEDURE EVALUATION
Patient: Andreina Barnes    Procedure Summary     Date: 09/20/22 Room / Location: [facility] OR [address] [place] [address] OR [facility]    Anesthesia Start: 0730 Anesthesia Stop: 0830    Procedure: Excision of left breast mass (Left Breast) Diagnosis:       Mass of left breast, unspecified quadrant      (Mass of left breast, unspecified quadrant [N63.20])    Surgeons: Savana Gaitan MD Provider: Jesus Manuel Blanca MD    Anesthesia Type: general ASA Status: 1          Anesthesia Type: general    Vitals  Vitals Value Taken Time   /87 09/20/22 0849   Temp 36.1 °C (97 °F) 09/20/22 0827   Pulse 94 09/20/22 0851   Resp 14 09/20/22 0835   SpO2 99 % 09/20/22 0851   Vitals shown include unvalidated device data.        Post Anesthesia Care and Evaluation    Patient location during evaluation: bedside  Patient participation: complete - patient participated  Level of consciousness: awake, responsive to verbal stimuli, responsive to light touch, responsive to noxious stimuli, responsive to painful stimuli and responsive to physical stimuli  Pain score: 2  Pain management: adequate    Airway patency: patent  Anesthetic complications: No anesthetic complications  PONV Status: none  Cardiovascular status: acceptable and stable  Respiratory status: acceptable and nasal cannula  Hydration status: acceptable    Comments: An Anesthesiologist personally participated in the most demanding procedures (including induction and emergence if applicable) in the anesthesia plan, monitored the course of anesthesia administration at frequent intervals and remained physically present and available for immediate diagnosis and treatment of emergencies.

## 2022-09-20 NOTE — ANESTHESIA PREPROCEDURE EVALUATION
Anesthesia Evaluation     Patient summary reviewed and Nursing notes reviewed   no history of anesthetic complications:  NPO Solid Status: > 8 hours  NPO Liquid Status: > 2 hours           Airway   Mallampati: II  TM distance: >3 FB  Neck ROM: full  No difficulty expected  Dental      Pulmonary - negative pulmonary ROS and normal exam    breath sounds clear to auscultation  Cardiovascular - negative cardio ROS and normal exam  Exercise tolerance: good (4-7 METS)    Rhythm: regular  Rate: normal        Neuro/Psych- negative ROS  GI/Hepatic/Renal/Endo - negative ROS     Musculoskeletal (-) negative ROS    Abdominal    Substance History - negative use     OB/GYN negative ob/gyn ROS         Other - negative ROS       ROS/Med Hx Other: Mass left breast                  Anesthesia Plan    ASA 1     general     (Patient understands anesthesia not responsible for dental damage.)  intravenous induction     Anesthetic plan, risks, benefits, and alternatives have been provided, discussed and informed consent has been obtained with: patient.    Use of blood products discussed with patient .   Plan discussed with CRNA.        CODE STATUS:

## 2022-09-23 ENCOUNTER — TELEPHONE (OUTPATIENT)
Dept: SURGERY | Facility: CLINIC | Age: 37
End: 2022-09-23

## 2022-09-23 RX ORDER — ONDANSETRON 4 MG/1
4 TABLET, FILM COATED ORAL EVERY 6 HOURS PRN
Qty: 9 TABLET | Refills: 1 | Status: SHIPPED | OUTPATIENT
Start: 2022-09-23 | End: 2022-10-23

## 2022-09-23 NOTE — TELEPHONE ENCOUNTER
Pt said she recently had surgery. Pt has fluid in her breast. What should she do? CB# [phone].

## 2022-09-29 ENCOUNTER — OFFICE VISIT (OUTPATIENT)
Dept: SURGERY | Facility: CLINIC | Age: 37
End: 2022-09-29

## 2022-09-29 VITALS — RESPIRATION RATE: 16 BRPM | WEIGHT: 153 LBS | HEIGHT: 65 IN | BODY MASS INDEX: 25.49 KG/M2

## 2022-09-29 DIAGNOSIS — N63.20 MASS OF LEFT BREAST, UNSPECIFIED QUADRANT: Primary | ICD-10-CM

## 2022-09-29 PROCEDURE — 99024 POSTOP FOLLOW-UP VISIT: CPT | Performed by: SURGERY

## 2022-09-29 NOTE — PROGRESS NOTES
"Chief Complaint  Breast Lesion and Post-op    Subjective          History of Present Illness  The patient is here to follow up on excision left breast mass.  They are doing well and have no complaints.  Pathology is shown below:    Results for orders placed or performed during the hospital encounter of 09/20/22   Pregnancy, Urine - Urine, Clean Catch    Specimen: Urine, Clean Catch   Result Value Ref Range    HCG, Urine QL Negative Negative   Tissue Pathology Exam    Specimen: Breast, Left; Tissue   Result Value Ref Range    Case Report       Surgical Pathology Report                         Case: [id number]                                  Authorizing Provider:  Savana Gaitan MD    Collected:           09/20/2022 08:02 AM          Ordering Location:     [facility]  Received:            09/20/2022 08:15 AM                                 OR                                                                           Pathologist:           Moraima Cowan MD                                                     Specimen:    Breast, Left, left breast mass                                                             Clinical Information       Mass of left breast, unspecified quadrant      Final Diagnosis       Left breast mass, excision:   -Fibroadenoma      Gross Description       1. Breast, Left.  The specimen is received fresh and subsequently placed in formalin, labeled \"left breast mass\" and consists of a 27 g, 5.0 x 4.5 x 2.5 cm (M/L x S/I x A/P) left lumpectomy specimen oriented by the surgeon with a short stitch designating superior and a long stitch designating lateral.  No skin is present.  The specimen is differentially inked (see ink key) and serially sectioned from medial to lateral to reveal 2.0 x 1.5 x 1.0 cm tan-white, fibrous, well-circumscribed mass involving the superior, anterior, and possibly lateral margins.  A distinct biopsy clip/cavity is not identified.  The mass is " "located 1.0 cm of the inferior margin, 2.0 cm of the posterior margin, and 2.5 cm of the medial margin.  The remaining breast parenchyma includes 10% white fibrous tissue and 90% lobulated adipose tissue with no additional lesions or lymph nodes.  Representative sections are submitted as follows: 1A-medial margin nearest to mass, serially sectioned; 1B-fibrous tissue involving anterior margin; 1C-1E- mass, entirely, to include involved superior and anterior margins, with nearest inferior margin in 1E; 1F-posterior margin nearest to mass; 1G-1H-lateral margin, serially section. Ink key: anterior-green, inferior-blue, lateral-orange, medial-yellow, posterior-black, superior-red.  Total formalin fixation time: 12 hours and 48 minutes; Cold ischemic time: 10 minutes  MAMIE        Microscopic Description          Objective   Vital Signs:   Resp 16   Ht 165.1 cm (65\")   Wt 69.4 kg (153 lb)   BMI 25.46 kg/m²     Physical Exam  Vitals and nursing note reviewed.   Constitutional:       General: She is not in acute distress.     Appearance: Normal appearance. She is well-developed.   HENT:      Head: Normocephalic and atraumatic.   Eyes:      Extraocular Movements: Extraocular movements intact.      Pupils: Pupils are equal, round, and reactive to light.   Cardiovascular:      Pulses: Normal pulses.   Pulmonary:      Effort: Pulmonary effort is normal. No retractions.      Breath sounds: Normal air entry. No wheezing.   Abdominal:      General: There is no distension.      Palpations: Abdomen is soft.      Tenderness: There is no abdominal tenderness.      Hernia: No hernia is present.   Musculoskeletal:         General: No swelling or deformity.      Cervical back: Neck supple.   Skin:     General: Skin is warm and dry.      Findings: No erythema.      Comments: Surgical Incision Healing Well   Neurological:      General: No focal deficit present.      Mental Status: She is alert and oriented to person, place, and time.     "  Motor: Motor function is intact.   Psychiatric:         Mood and Affect: Mood normal.         Thought Content: Thought content normal.            Result Review :                 Assessment and Plan    Diagnoses and all orders for this visit:    1. Mass of left breast, unspecified quadrant (Primary)    Return to routine screening  Followup prn    Follow Up   Return if symptoms worsen or fail to improve.  Patient was given instructions and counseling regarding her condition or for health maintenance advice. Please see specific information pulled into the AVS if appropriate.

## 2022-11-30 ENCOUNTER — HOSPITAL ENCOUNTER (OUTPATIENT)
Dept: GENERAL RADIOLOGY | Facility: HOSPITAL | Age: 37
Discharge: HOME | End: 2022-11-30
Admitting: NURSE PRACTITIONER

## 2022-11-30 ENCOUNTER — TRANSCRIBE ORDERS (OUTPATIENT)
Dept: GENERAL RADIOLOGY | Facility: HOSPITAL | Age: 37
End: 2022-11-30

## 2022-11-30 DIAGNOSIS — R10.9 ABDOMINAL PAIN, UNSPECIFIED ABDOMINAL LOCATION: ICD-10-CM

## 2022-11-30 DIAGNOSIS — R10.9 ABDOMINAL PAIN, UNSPECIFIED ABDOMINAL LOCATION: Primary | ICD-10-CM

## 2022-11-30 PROCEDURE — 74018 RADEX ABDOMEN 1 VIEW: CPT

## 2023-02-15 ENCOUNTER — OFFICE VISIT (OUTPATIENT)
Dept: OBSTETRICS AND GYNECOLOGY | Facility: CLINIC | Age: 38
End: 2023-02-15
Payer: COMMERCIAL

## 2023-02-15 VITALS
SYSTOLIC BLOOD PRESSURE: 117 MMHG | HEIGHT: 65 IN | DIASTOLIC BLOOD PRESSURE: 78 MMHG | BODY MASS INDEX: 25.99 KG/M2 | HEART RATE: 68 BPM | WEIGHT: 156 LBS

## 2023-02-15 DIAGNOSIS — N91.5 OLIGOMENORRHEA, UNSPECIFIED TYPE: Primary | ICD-10-CM

## 2023-02-15 DIAGNOSIS — Z30.011 ENCOUNTER FOR INITIAL PRESCRIPTION OF CONTRACEPTIVE PILLS: ICD-10-CM

## 2023-02-15 PROCEDURE — 99203 OFFICE O/P NEW LOW 30 MIN: CPT | Performed by: OBSTETRICS & GYNECOLOGY

## 2023-02-15 RX ORDER — NORGESTIMATE AND ETHINYL ESTRADIOL 7DAYSX3 LO
1 KIT ORAL DAILY
Qty: 84 TABLET | Refills: 3 | Status: SHIPPED | OUTPATIENT
Start: 2023-02-15 | End: 2023-04-05

## 2023-02-15 NOTE — PROGRESS NOTES
"GYN Problem/Follow Up Visit    Chief Complaint   Patient presents with   • Discuss fibroids           HPI  Andreina Barnes is a 37 y.o. female, , who presents for aub. Referred here by her pcp after having a pelvic u/s which showed something on an ovary she believes. C/o long hx of skipping menses for months at a time and then having weeks of spotting old blood. Will have cramping and feeling like she is going to start but then won't. Taking spironolactone for facial hair. Denies any dx of pcos previously.        Additional OB/GYN History   No LMP recorded (lmp unknown).  Current contraception: contraceptive methods: None  Allergies : Amoxicillin, Morphine, Penicillin g, and Penicillin v     The additional following portions of the patient's history were reviewed and updated as appropriate: allergies, current medications, past family history, past medical history, past social history, past surgical history and problem list.    Review of Systems    I have reviewed and agree with the HPI, ROS, and historical information as entered above. Eboni Barragan, APRN    Objective   /78   Pulse 68   Ht 165.1 cm (65\")   Wt 70.8 kg (156 lb)   LMP  (LMP Unknown)   BMI 25.96 kg/m²     Physical Exam  Vitals reviewed.   Neurological:      Mental Status: She is alert and oriented to person, place, and time.            Assessment and Plan    Diagnoses and all orders for this visit:    1. Oligomenorrhea, unspecified type (Primary)  -     norgestimate-ethinyl estradiol (Ortho Tri-Cyclen Lo) 0.18/0.215/0.25 MG-25 MCG per tablet; Take 1 tablet by mouth Daily.  Dispense: 84 tablet; Refill: 3    2. Encounter for initial prescription of contraceptive pills  -     norgestimate-ethinyl estradiol (Ortho Tri-Cyclen Lo) 0.18/0.215/0.25 MG-25 MCG per tablet; Take 1 tablet by mouth Daily.  Dispense: 84 tablet; Refill: 3    pelvic u/s report not available to discuss. Will obtain report from [facility] and call pt back to " discuss. Discussed tx options to help regulate menses and pt desires pill. Recheck in eight weeks, sooner if needed. Reports normal pap recently.     Counseling:  She understands the importance of having the above orders performed in a timely fashion.  She is encouraged to review her results online and/or contact or office if she has questions.     Follow Up:  Return in about 8 weeks (around 4/12/2023) for Recheck.      Eboni Barragan, MAXINE  02/15/2023

## 2023-03-09 ENCOUNTER — TELEPHONE (OUTPATIENT)
Dept: OBSTETRICS AND GYNECOLOGY | Facility: CLINIC | Age: 38
End: 2023-03-09
Payer: COMMERCIAL

## 2023-03-09 NOTE — TELEPHONE ENCOUNTER
If not in imaging then she may have had done outside of the [facility] just let me know and we will contact patient to see

## 2023-03-09 NOTE — TELEPHONE ENCOUNTER
Can you watch out to [facility] imaging and see about getting recent imaging results on this patient faxed to us please.

## 2023-03-09 NOTE — TELEPHONE ENCOUNTER
Patient called left message she is waiting on imaging results wanted to know if you have had a chance to view her results regarding her ovaries.

## 2023-03-15 ENCOUNTER — OFFICE VISIT (OUTPATIENT)
Dept: OBSTETRICS AND GYNECOLOGY | Facility: CLINIC | Age: 38
End: 2023-03-15
Payer: COMMERCIAL

## 2023-03-15 VITALS
BODY MASS INDEX: 26.59 KG/M2 | DIASTOLIC BLOOD PRESSURE: 83 MMHG | HEART RATE: 90 BPM | HEIGHT: 65 IN | WEIGHT: 159.6 LBS | SYSTOLIC BLOOD PRESSURE: 123 MMHG

## 2023-03-15 DIAGNOSIS — N94.10 DYSPAREUNIA IN FEMALE: ICD-10-CM

## 2023-03-15 DIAGNOSIS — N93.9 ABNORMAL UTERINE BLEEDING: Primary | ICD-10-CM

## 2023-03-15 PROCEDURE — 99212 OFFICE O/P EST SF 10 MIN: CPT | Performed by: OBSTETRICS & GYNECOLOGY

## 2023-03-15 NOTE — PROGRESS NOTES
"GYN Problem/Follow Up Visit    Chief Complaint   Patient presents with   • FOLLOW UP           HPI  Andreina Barnes is a 37 y.o. female, , who presents for u/s f/u. Seen for aub and pelvic pain. States intercourse is so painful now. It hurts in every position and every time.        Additional OB/GYN History   No LMP recorded (lmp unknown).  Current contraception: contraceptive methods: OCP (estrogen/progesterone)  Desires to: continue contraception  Allergies : Amoxicillin, Morphine, Penicillin g, and Penicillin v     The additional following portions of the patient's history were reviewed and updated as appropriate: allergies, current medications, past family history, past medical history, past social history, past surgical history and problem list.    Review of Systems    I have reviewed and agree with the HPI, ROS, and historical information as entered above. Eboni Barragan, APRN    Objective   /83   Pulse 90   Ht 165.1 cm (65\")   Wt 72.4 kg (159 lb 9.6 oz)   LMP  (LMP Unknown)   BMI 26.56 kg/m²     Physical Exam  Vitals reviewed.   Neurological:      Mental Status: She is alert and oriented to person, place, and time.            Assessment and Plan    Diagnoses and all orders for this visit:    1. Abnormal uterine bleeding (Primary)    2. Dyspareunia in female    reviewed pelvic u/s done 2/3/23: heterogeneous uterus. Stripe 2-3 mm. Discussed diff dx of endometriosis/adenomyosis/fibroids. Recommend pt see gyn doc for an eval and she agrees. Continue chc given at last ov.     Counseling:  She understands the importance of having the above orders performed in a timely fashion.  She is encouraged to review her results online and/or contact or office if she has questions.     Follow Up:  Return for eval by gyn doc for aub/pelvic pain.      Eboni Barragan, MAXINE  03/15/2023  "

## 2023-04-05 ENCOUNTER — OFFICE VISIT (OUTPATIENT)
Dept: INTERNAL MEDICINE | Facility: CLINIC | Age: 38
End: 2023-04-05
Payer: COMMERCIAL

## 2023-04-05 VITALS
WEIGHT: 158.2 LBS | SYSTOLIC BLOOD PRESSURE: 120 MMHG | HEART RATE: 113 BPM | TEMPERATURE: 97.8 F | OXYGEN SATURATION: 100 % | DIASTOLIC BLOOD PRESSURE: 78 MMHG | HEIGHT: 65 IN | BODY MASS INDEX: 26.36 KG/M2 | RESPIRATION RATE: 18 BRPM

## 2023-04-05 DIAGNOSIS — Z00.00 ENCOUNTER FOR MEDICAL EXAMINATION TO ESTABLISH CARE: Primary | ICD-10-CM

## 2023-04-05 DIAGNOSIS — R53.83 OTHER FATIGUE: ICD-10-CM

## 2023-04-05 DIAGNOSIS — T78.40XD ALLERGY, SUBSEQUENT ENCOUNTER: ICD-10-CM

## 2023-04-05 DIAGNOSIS — Z11.59 ENCOUNTER FOR HEPATITIS C SCREENING TEST FOR LOW RISK PATIENT: ICD-10-CM

## 2023-04-05 DIAGNOSIS — N92.6 IRREGULAR MENSES: ICD-10-CM

## 2023-04-05 DIAGNOSIS — Z13.31 POSITIVE DEPRESSION SCREENING: ICD-10-CM

## 2023-04-05 DIAGNOSIS — R12 HEART BURN: ICD-10-CM

## 2023-04-05 DIAGNOSIS — F43.21 GRIEF: ICD-10-CM

## 2023-04-05 DIAGNOSIS — L68.0 HIRSUTISM: ICD-10-CM

## 2023-04-05 DIAGNOSIS — R09.81 NASAL CONGESTION: ICD-10-CM

## 2023-04-05 DIAGNOSIS — Z13.29 SCREENING FOR THYROID DISORDER: ICD-10-CM

## 2023-04-05 LAB
25(OH)D3 SERPL-MCNC: 27.1 NG/ML (ref 30–100)
ALBUMIN SERPL-MCNC: 4.7 G/DL (ref 3.5–5.2)
ALBUMIN/GLOB SERPL: 1.8 G/DL
ALP SERPL-CCNC: 59 U/L (ref 39–117)
ALT SERPL W P-5'-P-CCNC: 14 U/L (ref 1–33)
ANION GAP SERPL CALCULATED.3IONS-SCNC: 12 MMOL/L (ref 5–15)
AST SERPL-CCNC: 15 U/L (ref 1–32)
BASOPHILS # BLD AUTO: 0.07 10*3/MM3 (ref 0–0.2)
BASOPHILS NFR BLD AUTO: 0.6 % (ref 0–1.5)
BILIRUB SERPL-MCNC: 0.6 MG/DL (ref 0–1.2)
BUN SERPL-MCNC: 8 MG/DL (ref 6–20)
BUN/CREAT SERPL: 9.3 (ref 7–25)
CALCIUM SPEC-SCNC: 9.9 MG/DL (ref 8.6–10.5)
CHLORIDE SERPL-SCNC: 101 MMOL/L (ref 98–107)
CHOLEST SERPL-MCNC: 181 MG/DL (ref 0–200)
CO2 SERPL-SCNC: 25 MMOL/L (ref 22–29)
CREAT SERPL-MCNC: 0.86 MG/DL (ref 0.57–1)
DEPRECATED RDW RBC AUTO: 41.3 FL (ref 37–54)
EGFRCR SERPLBLD CKD-EPI 2021: 89.9 ML/MIN/1.73
EOSINOPHIL # BLD AUTO: 0.2 10*3/MM3 (ref 0–0.4)
EOSINOPHIL NFR BLD AUTO: 1.7 % (ref 0.3–6.2)
ERYTHROCYTE [DISTWIDTH] IN BLOOD BY AUTOMATED COUNT: 11.8 % (ref 12.3–15.4)
FERRITIN SERPL-MCNC: 99.3 NG/ML (ref 13–150)
FOLATE SERPL-MCNC: 8.69 NG/ML (ref 4.78–24.2)
GLOBULIN UR ELPH-MCNC: 2.6 GM/DL
GLUCOSE SERPL-MCNC: 89 MG/DL (ref 65–99)
HBA1C MFR BLD: 5.8 % (ref 4.8–5.6)
HCT VFR BLD AUTO: 44.5 % (ref 34–46.6)
HCV AB SER DONR QL: NORMAL
HDLC SERPL-MCNC: 39 MG/DL (ref 40–60)
HGB BLD-MCNC: 14.3 G/DL (ref 12–15.9)
IMM GRANULOCYTES # BLD AUTO: 0.04 10*3/MM3 (ref 0–0.05)
IMM GRANULOCYTES NFR BLD AUTO: 0.3 % (ref 0–0.5)
IRON 24H UR-MRATE: 63 MCG/DL (ref 37–145)
IRON SATN MFR SERPL: 13 % (ref 20–50)
LDLC SERPL CALC-MCNC: 105 MG/DL (ref 0–100)
LDLC/HDLC SERPL: 2.56 {RATIO}
LYMPHOCYTES # BLD AUTO: 4.02 10*3/MM3 (ref 0.7–3.1)
LYMPHOCYTES NFR BLD AUTO: 33.3 % (ref 19.6–45.3)
MCH RBC QN AUTO: 30.5 PG (ref 26.6–33)
MCHC RBC AUTO-ENTMCNC: 32.1 G/DL (ref 31.5–35.7)
MCV RBC AUTO: 94.9 FL (ref 79–97)
MONOCYTES # BLD AUTO: 1.26 10*3/MM3 (ref 0.1–0.9)
MONOCYTES NFR BLD AUTO: 10.4 % (ref 5–12)
NEUTROPHILS NFR BLD AUTO: 53.7 % (ref 42.7–76)
NEUTROPHILS NFR BLD AUTO: 6.5 10*3/MM3 (ref 1.7–7)
NRBC BLD AUTO-RTO: 0 /100 WBC (ref 0–0.2)
PLATELET # BLD AUTO: 319 10*3/MM3 (ref 140–450)
PMV BLD AUTO: 10.6 FL (ref 6–12)
POTASSIUM SERPL-SCNC: 4.4 MMOL/L (ref 3.5–5.2)
PROT SERPL-MCNC: 7.3 G/DL (ref 6–8.5)
RBC # BLD AUTO: 4.69 10*6/MM3 (ref 3.77–5.28)
SODIUM SERPL-SCNC: 138 MMOL/L (ref 136–145)
T4 FREE SERPL-MCNC: 1.39 NG/DL (ref 0.93–1.7)
TIBC SERPL-MCNC: 487 MCG/DL (ref 298–536)
TRANSFERRIN SERPL-MCNC: 327 MG/DL (ref 200–360)
TRIGL SERPL-MCNC: 211 MG/DL (ref 0–150)
TSH SERPL DL<=0.05 MIU/L-ACNC: 1.66 UIU/ML (ref 0.27–4.2)
VIT B12 BLD-MCNC: 309 PG/ML (ref 211–946)
VLDLC SERPL-MCNC: 37 MG/DL (ref 5–40)
WBC NRBC COR # BLD: 12.09 10*3/MM3 (ref 3.4–10.8)

## 2023-04-05 PROCEDURE — 86803 HEPATITIS C AB TEST: CPT | Performed by: STUDENT IN AN ORGANIZED HEALTH CARE EDUCATION/TRAINING PROGRAM

## 2023-04-05 PROCEDURE — 82746 ASSAY OF FOLIC ACID SERUM: CPT | Performed by: STUDENT IN AN ORGANIZED HEALTH CARE EDUCATION/TRAINING PROGRAM

## 2023-04-05 PROCEDURE — 82306 VITAMIN D 25 HYDROXY: CPT | Performed by: STUDENT IN AN ORGANIZED HEALTH CARE EDUCATION/TRAINING PROGRAM

## 2023-04-05 PROCEDURE — 83540 ASSAY OF IRON: CPT | Performed by: STUDENT IN AN ORGANIZED HEALTH CARE EDUCATION/TRAINING PROGRAM

## 2023-04-05 PROCEDURE — 83036 HEMOGLOBIN GLYCOSYLATED A1C: CPT | Performed by: STUDENT IN AN ORGANIZED HEALTH CARE EDUCATION/TRAINING PROGRAM

## 2023-04-05 PROCEDURE — 84439 ASSAY OF FREE THYROXINE: CPT | Performed by: STUDENT IN AN ORGANIZED HEALTH CARE EDUCATION/TRAINING PROGRAM

## 2023-04-05 PROCEDURE — 82728 ASSAY OF FERRITIN: CPT | Performed by: STUDENT IN AN ORGANIZED HEALTH CARE EDUCATION/TRAINING PROGRAM

## 2023-04-05 PROCEDURE — 82607 VITAMIN B-12: CPT | Performed by: STUDENT IN AN ORGANIZED HEALTH CARE EDUCATION/TRAINING PROGRAM

## 2023-04-05 PROCEDURE — 80061 LIPID PANEL: CPT | Performed by: STUDENT IN AN ORGANIZED HEALTH CARE EDUCATION/TRAINING PROGRAM

## 2023-04-05 PROCEDURE — 80050 GENERAL HEALTH PANEL: CPT | Performed by: STUDENT IN AN ORGANIZED HEALTH CARE EDUCATION/TRAINING PROGRAM

## 2023-04-05 PROCEDURE — 84466 ASSAY OF TRANSFERRIN: CPT | Performed by: STUDENT IN AN ORGANIZED HEALTH CARE EDUCATION/TRAINING PROGRAM

## 2023-04-05 RX ORDER — SPIRONOLACTONE 50 MG/1
50 TABLET, FILM COATED ORAL DAILY
Qty: 30 TABLET | Refills: 2 | Status: SHIPPED | OUTPATIENT
Start: 2023-04-05 | End: 2023-05-05

## 2023-04-05 RX ORDER — CETIRIZINE HYDROCHLORIDE 10 MG/1
10 TABLET ORAL DAILY
Qty: 30 TABLET | Refills: 2 | Status: SHIPPED | OUTPATIENT
Start: 2023-04-05 | End: 2023-05-05

## 2023-04-05 RX ORDER — OMEPRAZOLE 20 MG/1
20 CAPSULE, DELAYED RELEASE ORAL DAILY
Qty: 30 CAPSULE | Refills: 2 | Status: SHIPPED | OUTPATIENT
Start: 2023-04-05 | End: 2023-05-05

## 2023-04-05 RX ORDER — FLUTICASONE PROPIONATE 50 MCG
2 SPRAY, SUSPENSION (ML) NASAL DAILY
Qty: 16 G | Refills: 2 | Status: SHIPPED | OUTPATIENT
Start: 2023-04-05

## 2023-04-05 NOTE — PROGRESS NOTES
"Chief Complaint  Establish Care, Fatigue (Feels tired all the time regardless of diet or amount of sleep.), Menstrual Problem (Spotting, will sometimes spot for 20 days and then sometimes go a couple months without./Has an appointment in April to discuss with GYN.), Allergies (Feels like there is constant drainage, feels like she's having trouble swallowing saliva for the past couple week. Can swallow food and drinks. ), and medication (Would like to see if the dosage can be changed on the spirolactone because she has to go to the bathroom 3 times a night and is waking up sweating at night.)    Subjective            Andreina Barnes presents to [facility]  History of Present Illness       Previous PCP: Dayan Rivas,   Last seen by them: 33-4 weeks ago.   Last pap: 3 mos ago, no hx of abnormal pap exam. [facility].   Last mammogram: 2022, lump in left breast was benign. Paternal great aunt with breast cancer in her 40's.  Last dexa scan:   Colonoscopy: n/a. Has apt w/ GI for concern for urge w/ stools.      Fatigue:  Ongoing for past couple of years.  Kept putting it off.   Thought maybe it was related to the sudden loss of her mother or due to her hormone issue.   Mother dc in 2020 form stage IV liver cancer, in her 50's, primary was bladder cancer.   States her mother always had belly issue.   States she feels like she is becoming a worrier ever since she lost her mother.  States she does not feel as healthy.  States she still feel tired and low energy even if she gets good sleep.   States her  has told her that she snores.    Other pertinent fhx of colon cancer in paternal grandfather in his 80's, maternal grandfather w/ bone cancer in his 70's.       States she is able to swallow her food and drinks but has a hard time swallowing her saliva.   She does have heartburn. Takes otc agents which helps for a \"little bit\".     She is a smoker, " smokes 1 pack every 3d. longuest quit time was 3-4 mos. Has tried Chantix in past which did not work.    Irregular menses:  Chronic,   States period were last normal in her early 20's.   Last full period was was 3 months ago, has had no period or spotting since then.   , .     Hirsutism:  Started on spironolactone about 3 years ago for increase facial hair.   States she has to shave her face every morning. States she has not notice any change in the rate of hair growth since starting the spironolactone.     Nightsweats:  Intermittent, for past few months.   States she will wake up with puddle of sweat on her chest, has had to change her clothes and bedsheets.   States symptoms spontaneously resolved initially but recurred about 2-3wks ago.   Endorse weight gain.   No change in appetite.   Past Medical History:   Diagnosis Date   • Anxiety    • Hormone imbalance    • Mass of left breast        Allergies:   Allergies   Allergen Reactions   • Amoxicillin Swelling and Rash   • Morphine Hives   • Penicillin G Swelling   • Penicillin V Swelling          Past Surgical History:   Procedure Laterality Date   • APPENDECTOMY     • BREAST LUMPECTOMY Left 2022    Procedure: Excision of left breast mass;  Surgeon: Savana Gaitan MD;  Location: [facility];  Service: General;  Laterality: Left;   • TONSILLECTOMY            Social History     Socioeconomic History   • Marital status:    Tobacco Use   • Smoking status: Some Days     Packs/day: 0.50     Types: Cigarettes   • Smokeless tobacco: Never   • Tobacco comments:     1 pack every 3 days off and on since 17   Vaping Use   • Vaping Use: Every day   • Substances: Nicotine, NO VAPING 24 HR PRIOR TO SURGERY   • Devices: Disposable   Substance and Sexual Activity   • Alcohol use: Yes     Comment: occasional   • Drug use: Never   • Sexual activity: Yes     Partners: Male     Birth control/protection: Vasectomy         Family History   Problem Relation Age  "of Onset   • Liver cancer Mother 50        started in gallbladder, they removed that and it went to liver   • Malig Hyperthermia Neg Hx    • Breast cancer Neg Hx    • Ovarian cancer Neg Hx    • Uterine cancer Neg Hx    • Colon cancer Neg Hx    • Melanoma Neg Hx           Health Maintenance Due   Topic Date Due   • COVID-19 Vaccine (1) Never done   • Pneumococcal Vaccine 0-64 (1 - PCV) Never done   • ANNUAL PHYSICAL  Never done   • PAP SMEAR  Never done            Current Outpatient Medications:   •  cetirizine (zyrTEC) 10 MG tablet, Take 1 tablet by mouth Daily for 30 days., Disp: 30 tablet, Rfl: 2  •  fluticasone (FLONASE) 50 MCG/ACT nasal spray, 2 sprays into the nostril(s) as directed by provider Daily., Disp: 16 g, Rfl: 2  •  omeprazole (priLOSEC) 20 MG capsule, Take 1 capsule by mouth Daily for 30 days., Disp: 30 capsule, Rfl: 2  •  spironolactone (Aldactone) 50 MG tablet, Take 1 tablet by mouth Daily for 30 days., Disp: 30 tablet, Rfl: 2      Immunization History   Administered Date(s) Administered   • Tdap 01/20/2022         Review of Systems       Objective       Vitals:    04/05/23 0836   BP: 120/78   BP Location: Right arm   Patient Position: Sitting   Cuff Size: Adult   Pulse: 113   Resp: 18   Temp: 97.8 °F (36.6 °C)   SpO2: 100%   Weight: 71.8 kg (158 lb 3.2 oz)   Height: 165.1 cm (65\")     Body mass index is 26.33 kg/m².      Physical Exam  Vitals reviewed.   Constitutional:       Appearance: Normal appearance.   HENT:      Head: Normocephalic and atraumatic.      Nose: Nose normal.   Eyes:      Extraocular Movements: Extraocular movements intact.      Conjunctiva/sclera: Conjunctivae normal.   Cardiovascular:      Rate and Rhythm: Normal rate and regular rhythm.      Pulses: Normal pulses.      Heart sounds: Normal heart sounds.   Pulmonary:      Effort: Pulmonary effort is normal. No respiratory distress.      Breath sounds: Normal breath sounds.   Abdominal:      General: Bowel sounds are normal.     "  Palpations: Abdomen is soft.   Musculoskeletal:         General: Normal range of motion.   Skin:     General: Skin is warm and dry.      Comments: Shaved chin    Neurological:      General: No focal deficit present.      Mental Status: She is alert and oriented to person, place, and time.      Cranial Nerves: No cranial nerve deficit.   Psychiatric:         Mood and Affect: Mood normal.         Behavior: Behavior normal.         Thought Content: Thought content normal.             Result Review :     The following data was reviewed by: Alyssa King MD on 04/05/2023:        Data reviewed: Transvaginal US from Feb 2023              Assessment and Plan      Diagnoses and all orders for this visit:    1. Encounter for medical examination to establish care (Primary)  Comments:  Acute and chronic needs addressed. Baseline labs orders.  Orders:  -     CBC & Differential  -     Comprehensive Metabolic Panel  -     Hemoglobin A1c  -     Lipid Panel  -     TSH  -     T4, Free    2. Hirsutism  Comments:  Chronic, with concern for pcos. Was on spironolactone 50mg bid which is not working. May benefit from PING vs progestin only pill. Pt has upcoming apt w/ ob/gyn.  Orders:  -     spironolactone (Aldactone) 50 MG tablet; Take 1 tablet by mouth Daily for 30 days.  Dispense: 30 tablet; Refill: 2    3. Other fatigue  Comments:  Chronic, most likely multifactorial. Labs ordered for evaluation for anemia, thyroid disease and electrolytes disturbances as possible contributing factors.   Orders:  -     Vitamin D 25 hydroxy  -     Vitamin B12 & Folate  -     Iron and TIBC  -     Ferritin    4. Irregular menses  Comments:  Chronic, active. Pt also w/ hirsutism w/ concern for pcos. Transvaginal US on Feb 2023 showed normal ovaries, heterogenous uterine parenchyma vs echogenic submucosal fibroid ~1.8cm   Has upcoming apt w/ ob/gyn    5. Screening for thyroid disorder  Comments:  Due for screening per current guidelines.    6. Encounter  for hepatitis C screening test for low risk patient  Comments:  Due for screening per current guidelines.  Orders:  -     Hepatitis C antibody    7. Heart burn  Comments:  Chronic. Starting omeprazole. Pt does have upcoming apt w/ GI   Orders:  -     omeprazole (priLOSEC) 20 MG capsule; Take 1 capsule by mouth Daily for 30 days.  Dispense: 30 capsule; Refill: 2    8. Nasal congestion  Comments:  Subacute to chronic, most likely cause of difficulty swallowing as reported in HPI, since this only occurs w/ her saliva. Recommend daily zytec and flonase. Would benefit from further eval w/ EGD. Has upcoming apt w/ GI   Orders:  -     fluticasone (FLONASE) 50 MCG/ACT nasal spray; 2 sprays into the nostril(s) as directed by provider Daily.  Dispense: 16 g; Refill: 2    9. Grief  Comments:  Chronic, active. Recommend counseling which she will consider.     10. Allergy, subsequent encounter  Comments:  Chronic, active. zyrtec and flonase per above.   Orders:  -     cetirizine (zyrTEC) 10 MG tablet; Take 1 tablet by mouth Daily for 30 days.  Dispense: 30 tablet; Refill: 2  -     fluticasone (FLONASE) 50 MCG/ACT nasal spray; 2 sprays into the nostril(s) as directed by provider Daily.  Dispense: 16 g; Refill: 2    11. Positive depression screening  Comments:  Phq9 score of 11. Denies SI. Most likely due to grief reaction see above.         Follow Up     Return in about 2 weeks (around 4/19/2023) for fatigue .    Patient was given instructions and counseling regarding her condition or for health maintenance advice. Please see specific information pulled into the AVS if appropriate.     Alyssa King MD   Internal Medicine-Pediatrics

## 2023-04-06 PROBLEM — T78.40XA ALLERGIES: Status: ACTIVE | Noted: 2023-04-06

## 2023-04-06 PROBLEM — L68.0 HIRSUTISM: Status: ACTIVE | Noted: 2023-04-06

## 2023-04-06 PROBLEM — N92.6 IRREGULAR MENSES: Status: ACTIVE | Noted: 2023-04-06

## 2023-04-06 PROBLEM — R12 HEART BURN: Status: ACTIVE | Noted: 2023-04-06

## 2023-04-06 PROBLEM — F43.21 GRIEF: Status: ACTIVE | Noted: 2023-04-06

## 2023-04-06 PROBLEM — Z13.31 POSITIVE DEPRESSION SCREENING: Status: ACTIVE | Noted: 2023-04-06

## 2023-04-06 PROBLEM — R53.83 OTHER FATIGUE: Status: ACTIVE | Noted: 2023-04-06

## 2023-04-06 PROBLEM — R09.81 NASAL CONGESTION: Status: ACTIVE | Noted: 2023-04-06

## 2023-04-18 ENCOUNTER — OFFICE VISIT (OUTPATIENT)
Dept: GASTROENTEROLOGY | Facility: CLINIC | Age: 38
End: 2023-04-18
Payer: COMMERCIAL

## 2023-04-18 VITALS
BODY MASS INDEX: 25.99 KG/M2 | HEIGHT: 65 IN | WEIGHT: 156 LBS | DIASTOLIC BLOOD PRESSURE: 80 MMHG | HEART RATE: 89 BPM | SYSTOLIC BLOOD PRESSURE: 121 MMHG

## 2023-04-18 DIAGNOSIS — R14.0 ABDOMINAL BLOATING: Primary | ICD-10-CM

## 2023-04-18 DIAGNOSIS — R19.7 DIARRHEA, UNSPECIFIED TYPE: ICD-10-CM

## 2023-04-18 DIAGNOSIS — K59.00 CONSTIPATION, UNSPECIFIED CONSTIPATION TYPE: ICD-10-CM

## 2023-04-18 DIAGNOSIS — R12 HEARTBURN: ICD-10-CM

## 2023-04-18 RX ORDER — OCTISALATE, AVOBENZONE, HOMOSALATE, AND OCTOCRYLENE 29.4; 29.4; 49; 25.48 MG/ML; MG/ML; MG/ML; MG/ML
4 LOTION TOPICAL DAILY
Qty: 30 CAPSULE | Refills: 5 | Status: SHIPPED | OUTPATIENT
Start: 2023-04-18

## 2023-04-18 RX ORDER — SOD SULF/POT CHLORIDE/MAG SULF 1.479 G
12 TABLET ORAL TAKE AS DIRECTED
Qty: 24 TABLET | Refills: 0 | Status: SHIPPED | OUTPATIENT
Start: 2023-04-18 | End: 2023-04-19

## 2023-04-18 NOTE — PROGRESS NOTES
Chief Complaint     Abdominal Pain    History of Present Illness     Andreina Barnes is a 37 y.o. female who presents to [facility] on referral from MAXINE Lyles for a gastroenterology evaluation of abdominal pain.      She reports abdominal bloating that tends to be worse in the evening.  States that as the day progresses bloating worsens.     Admits frequent heartburn that she states is much improved since beginning omeprazole 20 mg daily per primary care provider.  Denies dysphagia with foods and liquids however reports that at night when lying down she has a difficult time swallowing saliva.    Admits and irregular bowel pattern.  Will have constipation for several days followed by diarrhea.  Reports one episode of BRB when wiping following a bowel movement.  This was present when having diarrhea.  She has tried probiotic gummies but she's not taking them consistently.         History      Past Medical History:   Diagnosis Date   • Anxiety    • GERD (gastroesophageal reflux disease)    • Hormone imbalance    • Mass of left breast        Past Surgical History:   Procedure Laterality Date   • APPENDECTOMY     • BREAST LUMPECTOMY Left 9/20/2022    Procedure: Excision of left breast mass;  Surgeon: Savana Gaitan MD;  [place] [facility] [place] [facility] [place]  Service: General;  Laterality: Left;   • TONSILLECTOMY         Family History   Problem Relation Age of Onset   • Liver cancer Mother 50        started in gallbladder, they removed that and it went to liver   • Colon cancer Paternal Grandfather    • Malig Hyperthermia Neg Hx    • Breast cancer Neg Hx    • Ovarian cancer Neg Hx    • Uterine cancer Neg Hx    • Melanoma Neg Hx         Current Medications        Current Outpatient Medications:   •  cetirizine (zyrTEC) 10 MG tablet, Take 1 tablet by mouth Daily for 30 days., Disp: 30 tablet, Rfl: 2  •  fluticasone (FLONASE) 50 MCG/ACT nasal spray, 2 sprays into the nostril(s) as  "directed by provider Daily., Disp: 16 g, Rfl: 2  •  omeprazole (priLOSEC) 20 MG capsule, Take 1 capsule by mouth Daily for 30 days., Disp: 30 capsule, Rfl: 2  •  spironolactone (Aldactone) 50 MG tablet, Take 1 tablet by mouth Daily for 30 days., Disp: 30 tablet, Rfl: 2  •  Probiotic Product (Align) 4 MG capsule, Take 4 mg by mouth Daily., Disp: 30 capsule, Rfl: 5  •  Sodium Sulfate-Mag Sulfate-KCl (Sutab) 5016-654-332 MG tablet, Take 12 tablets by mouth Take As Directed. Take 12 tablets at 6 pm and 12 tablets 4 hours prior to procedure., Disp: 24 tablet, Rfl: 0     Allergies     Allergies   Allergen Reactions   • Amoxicillin Swelling and Rash   • Morphine Hives   • Penicillin G Swelling   • Penicillin V Swelling       Social History       Social History     Social History Narrative   • Not on file       Immunizations     Immunization:  Immunization History   Administered Date(s) Administered   • Tdap 01/20/2022          Objective     Objective     Vital Signs:   /80 (BP Location: Left arm, Patient Position: Sitting, Cuff Size: Adult)   Pulse 89   Ht 165.1 cm (65\")   Wt 70.8 kg (156 lb)   BMI 25.96 kg/m²       Physical Exam    Results      Result Review :   The following data was reviewed by: MAXINE Campoverde on 04/18/2023:    CBC w/diff        4/5/2023    10:33   CBC w/Diff   WBC 12.09     RBC 4.69     Hemoglobin 14.3     Hematocrit 44.5     MCV 94.9     MCH 30.5     MCHC 32.1     RDW 11.8     Platelets 319     Neutrophil Rel % 53.7     Immature Granulocyte Rel % 0.3     Lymphocyte Rel % 33.3     Monocyte Rel % 10.4     Eosinophil Rel % 1.7     Basophil Rel % 0.6       CMP        4/5/2023    10:33   CMP   Glucose 89     BUN 8     Creatinine 0.86     EGFR 89.9     Sodium 138     Potassium 4.4     Chloride 101     Calcium 9.9     Total Protein 7.3     Albumin 4.7     Globulin 2.6     Total Bilirubin 0.6     Alkaline Phosphatase 59     AST (SGOT) 15     ALT (SGPT) 14     Albumin/Globulin Ratio 1.8   "   BUN/Creatinine Ratio 9.3     Anion Gap 12.0         Iron Profile   Iron   Date Value Ref Range Status   04/05/2023 63 37 - 145 mcg/dL Final     TIBC   Date Value Ref Range Status   04/05/2023 487 298 - 536 mcg/dL Final     Iron Saturation   Date Value Ref Range Status   04/05/2023 13 (L) 20 - 50 % Final     Transferrin   Date Value Ref Range Status   04/05/2023 327 200 - 360 mg/dL Final     Ferritin   Ferritin   Date Value Ref Range Status   04/05/2023 99.30 13.00 - 150.00 ng/mL Final     11/30/2022 abdominal x-ray-no evidence of bowel obstruction     Assessment and Plan        Assessment and Plan    Diagnoses and all orders for this visit:    1. Abdominal bloating (Primary)  -     Case Request; Standing  -     Case Request  -     Celiac Panel Reflex To Titer; Future    2. Heartburn  -     Case Request; Standing  -     Case Request    3. Constipation, unspecified constipation type  -     Case Request; Standing  -     Case Request  -     Probiotic Product (Align) 4 MG capsule; Take 4 mg by mouth Daily.  Dispense: 30 capsule; Refill: 5    4. Diarrhea, unspecified type  -     Case Request; Standing  -     Case Request  -     Probiotic Product (Align) 4 MG capsule; Take 4 mg by mouth Daily.  Dispense: 30 capsule; Refill: 5  -     Celiac Panel Reflex To Titer; Future    Other orders  -     Follow Anesthesia Guidelines / Protocol; Future  -     Verify NPO; Standing  -     Verify Bowel Prep Was Successful; Standing  -     Give Tap Water Enema If Bowel Prep Insufficient; Standing  -     Sodium Sulfate-Mag Sulfate-KCl (Sutab) 2567-214-365 MG tablet; Take 12 tablets by mouth Take As Directed. Take 12 tablets at 6 pm and 12 tablets 4 hours prior to procedure.  Dispense: 24 tablet; Refill: 0        ESOPHAGOGASTRODUODENOSCOPY (N/A), COLONOSCOPY (N/A)  The risk of the endoscopy were discussed in detail. Possible risks/complications, benefits, and alternatives to surgical or invasive procedure have been explained to patient  and/or legal guardian; risks include bleeding, infection, and perforation. Patient has been evaluated and can tolerate anesthesia and/or sedation.       Follow Up        Follow Up   Return for F/U after procedure.  Patient was given instructions and counseling regarding her condition or for health maintenance advice. Please see specific information pulled into the AVS if appropriate.

## 2023-04-19 ENCOUNTER — OFFICE VISIT (OUTPATIENT)
Dept: INTERNAL MEDICINE | Facility: CLINIC | Age: 38
End: 2023-04-19
Payer: COMMERCIAL

## 2023-04-19 VITALS
SYSTOLIC BLOOD PRESSURE: 108 MMHG | BODY MASS INDEX: 26.42 KG/M2 | DIASTOLIC BLOOD PRESSURE: 64 MMHG | OXYGEN SATURATION: 99 % | TEMPERATURE: 97.8 F | WEIGHT: 158.6 LBS | RESPIRATION RATE: 18 BRPM | HEIGHT: 65 IN | HEART RATE: 99 BPM

## 2023-04-19 DIAGNOSIS — D72.829 LEUKOCYTOSIS, UNSPECIFIED TYPE: ICD-10-CM

## 2023-04-19 DIAGNOSIS — E53.8 LOW SERUM VITAMIN B12: ICD-10-CM

## 2023-04-19 DIAGNOSIS — R12 HEART BURN: ICD-10-CM

## 2023-04-19 DIAGNOSIS — R53.83 OTHER FATIGUE: ICD-10-CM

## 2023-04-19 DIAGNOSIS — F17.210 CIGARETTE NICOTINE DEPENDENCE WITHOUT COMPLICATION: ICD-10-CM

## 2023-04-19 DIAGNOSIS — E55.9 VITAMIN D DEFICIENCY: ICD-10-CM

## 2023-04-19 DIAGNOSIS — R13.10 DYSPHAGIA, UNSPECIFIED TYPE: ICD-10-CM

## 2023-04-19 DIAGNOSIS — R73.03 PREDIABETES: Primary | ICD-10-CM

## 2023-04-19 PROCEDURE — 99214 OFFICE O/P EST MOD 30 MIN: CPT | Performed by: STUDENT IN AN ORGANIZED HEALTH CARE EDUCATION/TRAINING PROGRAM

## 2023-04-19 RX ORDER — NICOTINE 21 MG/24HR
1 PATCH, TRANSDERMAL 24 HOURS TRANSDERMAL EVERY 24 HOURS
Qty: 28 PATCH | Refills: 3 | Status: SHIPPED | OUTPATIENT
Start: 2023-04-19 | End: 2023-05-17

## 2023-04-19 RX ORDER — METFORMIN HYDROCHLORIDE 750 MG/1
750 TABLET, EXTENDED RELEASE ORAL
Qty: 30 TABLET | Refills: 0 | Status: SHIPPED | OUTPATIENT
Start: 2023-04-19 | End: 2023-05-19

## 2023-04-19 RX ORDER — ERGOCALCIFEROL 1.25 MG/1
50000 CAPSULE ORAL WEEKLY
Qty: 12 CAPSULE | Refills: 1 | Status: SHIPPED | OUTPATIENT
Start: 2023-04-19 | End: 2023-07-06

## 2023-04-19 NOTE — PROGRESS NOTES
Chief Complaint  Fatigue (2 week follow up), labs, and nicotine patches    Subjective            Andreina Barnes presents to [facility]  History of Present Illness    GERD:  Difficulty swallowing saliva:  Reflux improved on ppi.   Difficulty swallowing her saliva remains an issue.   States she woke up a couple weeks ago and felt like she was choking,  Like she could not get any air in, lasted a few seconds but resolved spontaneously.     Fatigue:  Chronic, and active.     Cigarette smoking:  Chronic, wanting to quit smoking. Would like to discuss quitting aids.     Hirsutism:  Concern for PCOS:  Has upcoming apt w/ OB/gyn     Presenting for lab result review.     Past Medical History:   Diagnosis Date   • Anxiety    • GERD (gastroesophageal reflux disease)    • Hormone imbalance    • Mass of left breast        Allergies:   Allergies   Allergen Reactions   • Amoxicillin Swelling and Rash   • Morphine Hives   • Penicillin G Swelling   • Penicillin V Swelling          Past Surgical History:   Procedure Laterality Date   • APPENDECTOMY     • BREAST LUMPECTOMY Left 9/20/2022    Procedure: Excision of left breast mass;  Surgeon: Savana Gaitan MD;  [facility] [facility] [place] [facility] [place]  Service: General;  Laterality: Left;   • TONSILLECTOMY            Social History     Socioeconomic History   • Marital status:    Tobacco Use   • Smoking status: Some Days     Packs/day: 0.50     Types: Cigarettes   • Smokeless tobacco: Never   • Tobacco comments:     1 pack every 3 days off and on since 17   Vaping Use   • Vaping Use: Every day   • Substances: Nicotine, NO VAPING 24 HR PRIOR TO SURGERY   • Devices: Disposable   Substance and Sexual Activity   • Alcohol use: Yes     Comment: occasional   • Drug use: Never   • Sexual activity: Yes     Partners: Male     Birth control/protection: Vasectomy         Family History   Problem Relation Age of Onset   • Liver cancer Mother 50         started in gallbladder, they removed that and it went to liver   • Colon cancer Paternal Grandfather    • Malig Hyperthermia Neg Hx    • Breast cancer Neg Hx    • Ovarian cancer Neg Hx    • Uterine cancer Neg Hx    • Melanoma Neg Hx           Health Maintenance Due   Topic Date Due   • COVID-19 Vaccine (1) Never done   • Pneumococcal Vaccine 0-64 (1 - PCV) Never done   • ANNUAL PHYSICAL  Never done   • PAP SMEAR  Never done            Current Outpatient Medications:   •  cetirizine (zyrTEC) 10 MG tablet, Take 1 tablet by mouth Daily for 30 days., Disp: 30 tablet, Rfl: 2  •  fluticasone (FLONASE) 50 MCG/ACT nasal spray, 2 sprays into the nostril(s) as directed by provider Daily., Disp: 16 g, Rfl: 2  •  omeprazole (priLOSEC) 20 MG capsule, Take 1 capsule by mouth Daily for 30 days., Disp: 30 capsule, Rfl: 2  •  Probiotic Product (Align) 4 MG capsule, Take 4 mg by mouth Daily., Disp: 30 capsule, Rfl: 5  •  spironolactone (Aldactone) 50 MG tablet, Take 1 tablet by mouth Daily for 30 days., Disp: 30 tablet, Rfl: 2  •  metFORMIN ER (GLUCOPHAGE-XR) 750 MG 24 hr tablet, Take 1 tablet by mouth Daily With Breakfast for 30 days., Disp: 30 tablet, Rfl: 0  •  nicotine (NICODERM CQ) 14 MG/24HR patch, Place 1 patch on the skin as directed by provider Daily for 28 doses., Disp: 28 patch, Rfl: 3  •  Sodium Sulfate-Mag Sulfate-KCl (Sutab) 7913-938-300 MG tablet, Take 12 tablets by mouth Take As Directed. Take 12 tablets at 6 pm and 12 tablets 4 hours prior to procedure., Disp: 24 tablet, Rfl: 0  •  vitamin D (ERGOCALCIFEROL) 1.25 MG (32866 UT) capsule capsule, Take 1 capsule by mouth 1 (One) Time Per Week for 12 doses., Disp: 12 capsule, Rfl: 1      Immunization History   Administered Date(s) Administered   • Tdap 01/20/2022         Review of Systems   soa at times, even when/if she is talking fast or out or doing something in the yard.   Hx of ashtma. In childhood.     Objective       Vitals:    04/19/23 0956   BP: 108/64   BP  "Location: Left arm   Patient Position: Sitting   Cuff Size: Adult   Pulse: 99   Resp: 18   Temp: 97.8 °F (36.6 °C)   SpO2: 99%   Weight: 71.9 kg (158 lb 9.6 oz)   Height: 165.1 cm (65\")     Body mass index is 26.39 kg/m².      Physical Exam  Vitals reviewed.   Constitutional:       Appearance: Normal appearance.   HENT:      Head: Normocephalic and atraumatic.      Nose: Nose normal.   Eyes:      Extraocular Movements: Extraocular movements intact.      Conjunctiva/sclera: Conjunctivae normal.   Cardiovascular:      Rate and Rhythm: Normal rate and regular rhythm.      Pulses: Normal pulses.      Heart sounds: Normal heart sounds.   Pulmonary:      Effort: Pulmonary effort is normal. No respiratory distress.      Breath sounds: Normal breath sounds.   Musculoskeletal:         General: Normal range of motion.   Skin:     General: Skin is warm and dry.   Neurological:      General: No focal deficit present.      Mental Status: She is alert and oriented to person, place, and time.      Cranial Nerves: No cranial nerve deficit.   Psychiatric:         Mood and Affect: Mood normal.         Behavior: Behavior normal.         Thought Content: Thought content normal.       Result Review :     The following data was reviewed by: Alyssa King MD on 04/19/2023:    Common labs        4/5/2023    10:33   Common Labs   Glucose 89     BUN 8     Creatinine 0.86     Sodium 138     Potassium 4.4     Chloride 101     Calcium 9.9     Albumin 4.7     Total Bilirubin 0.6     Alkaline Phosphatase 59     AST (SGOT) 15     ALT (SGPT) 14     WBC 12.09     Hemoglobin 14.3     Hematocrit 44.5     Platelets 319     Total Cholesterol 181     Triglycerides 211     HDL Cholesterol 39     LDL Cholesterol  105     Hemoglobin A1C 5.80                      Assessment and Plan      Diagnoses and all orders for this visit:    1. Prediabetes (Primary)  Comments:  New diagnosis. Starting metformin given concern for PCOS as well. Discussed most common " AE. Close f/u in 4-6 wks.   Orders:  -     metFORMIN ER (GLUCOPHAGE-XR) 750 MG 24 hr tablet; Take 1 tablet by mouth Daily With Breakfast for 30 days.  Dispense: 30 tablet; Refill: 0    2. Vitamin D deficiency  Comments:  New dx. Starting vitamin D supplement. Repeat labs in 3 mos.     3. Low serum vitamin B12  Comments:  Repeat B12 in a couple weeks, as well as homocysteine and MMA levels.   Orders:  -     Vitamin B12  -     Methylmalonic Acid, Serum  -     Homocysteine    4. Leukocytosis, unspecified type  Comments:  Acute and mild. Repeat labs in a couple weeks.  Orders:  -     CBC w AUTO Differential    5. Other fatigue  Comments:  Chronic and active. Initial w/u is unrevealing except for low vitamin D, borderline low B12 level.    6. Heart burn  Comments:  Improved on omeprazole which will be continued.     7. Cigarette nicotine dependence without complication  Comments:  Chronic, patient is wanting to quit. Nicotine patches sent in. f/u in 4-6 wks.   Orders:  -     nicotine (NICODERM CQ) 14 MG/24HR patch; Place 1 patch on the skin as directed by provider Daily for 28 doses.  Dispense: 28 patch; Refill: 3    8. Dysphagia, unspecified type  Comments:  Subacute to chronic, mostly w/ saliva. No dysphagia w/ foods or liquids. Reffered to Gi for consideration for EGD. Swallow study ordered for further eval.   Orders:  -     FL Video Swallow With Speech Single Contrast    Other orders  -     vitamin D (ERGOCALCIFEROL) 1.25 MG (37251 UT) capsule capsule; Take 1 capsule by mouth 1 (One) Time Per Week for 12 doses.  Dispense: 12 capsule; Refill: 1            Follow Up     Return in about 6 weeks (around 5/31/2023) for prediabetes, smoking cesation .    Patient was given instructions and counseling regarding her condition or for health maintenance advice. Please see specific information pulled into the AVS if appropriate.     Alyssa King MD   Internal Medicine-Pediatrics

## 2023-04-21 ENCOUNTER — PATIENT ROUNDING (BHMG ONLY) (OUTPATIENT)
Dept: GASTROENTEROLOGY | Facility: CLINIC | Age: 38
End: 2023-04-21
Payer: COMMERCIAL

## 2023-04-21 NOTE — PROGRESS NOTES
4/21/2023      Hello, may I speak with Andreina Barnes     My name is Amara, I am the Clinical Coordinator. I am calling from [facility]. I show that you had a recent visit with MAXINE Valverde.    Before we get started may I verify your date of birth? 1985    I am calling to officially welcome you to our practice and ask about your recent visit. Is this a good time to talk? No answer     Tell me about your visit with us. What things went well?         We're always looking for ways to make our patients' experiences even better. Do you have recommendations on ways we may improve?    Overall were you satisfied with your first visit to our practice?    I appreciate you taking the time to speak with me today. Is there anything else I can do for you?    I am glad to hear that you had a very good visit and I appreciate you taking the time to provide feedback on this call. We would greatly appreciate you filling out a survey if you receive one in the mail, email or text. This is a great opportunity to provide any additional feedback that you may think of after this call as well.       Thank you, and have a great day.

## 2023-05-02 ENCOUNTER — TELEPHONE (OUTPATIENT)
Dept: GASTROENTEROLOGY | Facility: CLINIC | Age: 38
End: 2023-05-02
Payer: COMMERCIAL

## 2023-05-02 NOTE — TELEPHONE ENCOUNTER
Caller: Andreina Barnes    Relationship: Self    Best call back number:   [phone]    What medication are you requesting: INCREASED DOSAGE OF NICOTINE PATCHES     If a prescription is needed, what is your preferred pharmacy and phone number: [id number] - [place], [place] - [address] AT [facility] OF [address] & [place] - [phone] [facility] [phone] FX     Additional notes: PATIENT STATED THE 14 MG PATCHES ARE NO LONGER HELPING HER. PLEASE ADVISE

## 2023-05-02 NOTE — TELEPHONE ENCOUNTER
Attempted to contact pt in regards to overdue results. Requested pt return call if she has any questions. Orders postponed to follow up.

## 2023-05-03 NOTE — TELEPHONE ENCOUNTER
Caller: CameronAndreina    Relationship: Self    Best call back number: [phone]    What is the best time to reach you: ANY    Who are you requesting to speak with (clinical staff, provider,  specific staff member): CLINICAL    What was the call regarding: PATIENT STATED THAT SHE IS WANTING  A CALL TO DISCUSS STATUS OF INCREASE TO HER NICOTINE PATCHES FROM 14 MG TO 21 MG.   [facility] #[id number] - [place], [place] - [address] AT [facility] OF [address] [facility] & [place] - [phone] [facility] [phone] FX    Do you require a callback: YES

## 2023-05-04 RX ORDER — NICOTINE 21 MG/24HR
1 PATCH, TRANSDERMAL 24 HOURS TRANSDERMAL EVERY 24 HOURS
Qty: 28 EACH | Refills: 2 | Status: SHIPPED | OUTPATIENT
Start: 2023-05-04

## 2023-05-04 NOTE — TELEPHONE ENCOUNTER
Caller: Andreina Barnes    Relationship: Self    Best call back number: [phone]    What medication are you requesting: NICOTINE PATCHES 21 MG     If a prescription is needed, what is your preferred pharmacy and phone number: [facility] #[id number] - [place], [place] - [address] AT [facility] OF [address] & [place] - [phone] [facility] [phone] FX     Additional notes:  PATIENT STATED THE THE 14 MG IS NOT WORKING AS SHE IS STILL CRAVING CIGARETTES.

## 2023-05-04 NOTE — TELEPHONE ENCOUNTER
21mg patches sent in. Please notify pt. Ty    Diagnostic testing not indicated for today's encounter

## 2023-05-05 ENCOUNTER — LAB (OUTPATIENT)
Dept: LAB | Facility: HOSPITAL | Age: 38
End: 2023-05-05
Payer: COMMERCIAL

## 2023-05-05 DIAGNOSIS — R19.7 DIARRHEA, UNSPECIFIED TYPE: ICD-10-CM

## 2023-05-05 DIAGNOSIS — R14.0 ABDOMINAL BLOATING: ICD-10-CM

## 2023-05-05 LAB
BASOPHILS # BLD AUTO: 0.06 10*3/MM3 (ref 0–0.2)
BASOPHILS NFR BLD AUTO: 0.5 % (ref 0–1.5)
DEPRECATED RDW RBC AUTO: 39.7 FL (ref 37–54)
EOSINOPHIL # BLD AUTO: 0.13 10*3/MM3 (ref 0–0.4)
EOSINOPHIL NFR BLD AUTO: 1 % (ref 0.3–6.2)
ERYTHROCYTE [DISTWIDTH] IN BLOOD BY AUTOMATED COUNT: 11.6 % (ref 12.3–15.4)
HCT VFR BLD AUTO: 40.3 % (ref 34–46.6)
HCYS SERPL-MCNC: 8 UMOL/L (ref 0–15)
HGB BLD-MCNC: 14.1 G/DL (ref 12–15.9)
IMM GRANULOCYTES # BLD AUTO: 0.06 10*3/MM3 (ref 0–0.05)
IMM GRANULOCYTES NFR BLD AUTO: 0.5 % (ref 0–0.5)
LYMPHOCYTES # BLD AUTO: 3.22 10*3/MM3 (ref 0.7–3.1)
LYMPHOCYTES NFR BLD AUTO: 25.1 % (ref 19.6–45.3)
MCH RBC QN AUTO: 32.3 PG (ref 26.6–33)
MCHC RBC AUTO-ENTMCNC: 35 G/DL (ref 31.5–35.7)
MCV RBC AUTO: 92.4 FL (ref 79–97)
MONOCYTES # BLD AUTO: 0.99 10*3/MM3 (ref 0.1–0.9)
MONOCYTES NFR BLD AUTO: 7.7 % (ref 5–12)
NEUTROPHILS NFR BLD AUTO: 65.2 % (ref 42.7–76)
NEUTROPHILS NFR BLD AUTO: 8.35 10*3/MM3 (ref 1.7–7)
NRBC BLD AUTO-RTO: 0 /100 WBC (ref 0–0.2)
PLATELET # BLD AUTO: 302 10*3/MM3 (ref 140–450)
PMV BLD AUTO: 10.7 FL (ref 6–12)
RBC # BLD AUTO: 4.36 10*6/MM3 (ref 3.77–5.28)
VIT B12 BLD-MCNC: 275 PG/ML (ref 211–946)
WBC NRBC COR # BLD: 12.81 10*3/MM3 (ref 3.4–10.8)

## 2023-05-05 PROCEDURE — 83090 ASSAY OF HOMOCYSTEINE: CPT | Performed by: STUDENT IN AN ORGANIZED HEALTH CARE EDUCATION/TRAINING PROGRAM

## 2023-05-05 PROCEDURE — 82607 VITAMIN B-12: CPT | Performed by: STUDENT IN AN ORGANIZED HEALTH CARE EDUCATION/TRAINING PROGRAM

## 2023-05-05 PROCEDURE — 86258 DGP ANTIBODY EACH IG CLASS: CPT

## 2023-05-05 PROCEDURE — 82784 ASSAY IGA/IGD/IGG/IGM EACH: CPT

## 2023-05-05 PROCEDURE — 36415 COLL VENOUS BLD VENIPUNCTURE: CPT

## 2023-05-05 PROCEDURE — 85025 COMPLETE CBC W/AUTO DIFF WBC: CPT | Performed by: STUDENT IN AN ORGANIZED HEALTH CARE EDUCATION/TRAINING PROGRAM

## 2023-05-05 PROCEDURE — 86364 TISS TRNSGLTMNASE EA IG CLAS: CPT

## 2023-05-05 PROCEDURE — 83921 ORGANIC ACID SINGLE QUANT: CPT | Performed by: STUDENT IN AN ORGANIZED HEALTH CARE EDUCATION/TRAINING PROGRAM

## 2023-05-06 LAB
GLIADIN PEPTIDE IGA SER-ACNC: 6 UNITS (ref 0–19)
IGA SERPL-MCNC: 315 MG/DL (ref 87–352)
TTG IGA SER-ACNC: <2 U/ML (ref 0–3)

## 2023-05-08 ENCOUNTER — TELEPHONE (OUTPATIENT)
Dept: GASTROENTEROLOGY | Facility: CLINIC | Age: 38
End: 2023-05-08
Payer: COMMERCIAL

## 2023-05-08 NOTE — TELEPHONE ENCOUNTER
----- Message from MAXINE Campoverde sent at 5/8/2023 11:12 AM EDT -----  Negative celiac serology.  No signs of gluten allergy.

## 2023-05-10 DIAGNOSIS — E53.8 B12 DEFICIENCY: ICD-10-CM

## 2023-05-10 DIAGNOSIS — D72.829 LEUKOCYTOSIS, UNSPECIFIED TYPE: Primary | ICD-10-CM

## 2023-05-10 RX ORDER — LANOLIN ALCOHOL/MO/W.PET/CERES
1000 CREAM (GRAM) TOPICAL DAILY
Qty: 90 TABLET | Refills: 1 | Status: SHIPPED | OUTPATIENT
Start: 2023-05-10 | End: 2023-08-08

## 2023-05-11 LAB — METHYLMALONATE SERPL-SCNC: 175 NMOL/L (ref 0–378)

## 2023-05-23 ENCOUNTER — HOSPITAL ENCOUNTER (EMERGENCY)
Facility: HOSPITAL | Age: 38
Discharge: HOME | End: 2023-05-23
Attending: EMERGENCY MEDICINE | Admitting: EMERGENCY MEDICINE
Payer: COMMERCIAL

## 2023-05-23 ENCOUNTER — APPOINTMENT (OUTPATIENT)
Dept: CT IMAGING | Facility: HOSPITAL | Age: 38
End: 2023-05-23
Payer: COMMERCIAL

## 2023-05-23 VITALS
OXYGEN SATURATION: 100 % | SYSTOLIC BLOOD PRESSURE: 119 MMHG | RESPIRATION RATE: 16 BRPM | HEART RATE: 88 BPM | WEIGHT: 153.66 LBS | TEMPERATURE: 98.1 F | HEIGHT: 65 IN | BODY MASS INDEX: 25.6 KG/M2 | DIASTOLIC BLOOD PRESSURE: 71 MMHG

## 2023-05-23 DIAGNOSIS — N28.1 RENAL CYST, RIGHT: Primary | ICD-10-CM

## 2023-05-23 DIAGNOSIS — K57.90 DIVERTICULOSIS: ICD-10-CM

## 2023-05-23 LAB
ALBUMIN SERPL-MCNC: 4.2 G/DL (ref 3.5–5.2)
ALBUMIN/GLOB SERPL: 1.4 G/DL
ALP SERPL-CCNC: 57 U/L (ref 39–117)
ALT SERPL W P-5'-P-CCNC: 15 U/L (ref 1–33)
ANION GAP SERPL CALCULATED.3IONS-SCNC: 10.9 MMOL/L (ref 5–15)
AST SERPL-CCNC: 14 U/L (ref 1–32)
BACTERIA UR QL AUTO: ABNORMAL /HPF
BASOPHILS # BLD AUTO: 0.05 10*3/MM3 (ref 0–0.2)
BASOPHILS NFR BLD AUTO: 0.4 % (ref 0–1.5)
BILIRUB SERPL-MCNC: 0.8 MG/DL (ref 0–1.2)
BILIRUB UR QL STRIP: NEGATIVE
BUN SERPL-MCNC: 6 MG/DL (ref 6–20)
BUN/CREAT SERPL: 8.5 (ref 7–25)
CALCIUM SPEC-SCNC: 8.7 MG/DL (ref 8.6–10.5)
CHLORIDE SERPL-SCNC: 102 MMOL/L (ref 98–107)
CLARITY UR: CLEAR
CO2 SERPL-SCNC: 25.1 MMOL/L (ref 22–29)
COLOR UR: YELLOW
CREAT SERPL-MCNC: 0.71 MG/DL (ref 0.57–1)
DEPRECATED RDW RBC AUTO: 42.9 FL (ref 37–54)
EGFRCR SERPLBLD CKD-EPI 2021: 113.2 ML/MIN/1.73
EOSINOPHIL # BLD AUTO: 0.1 10*3/MM3 (ref 0–0.4)
EOSINOPHIL NFR BLD AUTO: 0.9 % (ref 0.3–6.2)
ERYTHROCYTE [DISTWIDTH] IN BLOOD BY AUTOMATED COUNT: 12.5 % (ref 12.3–15.4)
GLOBULIN UR ELPH-MCNC: 3 GM/DL
GLUCOSE SERPL-MCNC: 96 MG/DL (ref 65–99)
GLUCOSE UR STRIP-MCNC: NEGATIVE MG/DL
HCG INTACT+B SERPL-ACNC: <0.5 MIU/ML
HCT VFR BLD AUTO: 39.1 % (ref 34–46.6)
HGB BLD-MCNC: 13.3 G/DL (ref 12–15.9)
HGB UR QL STRIP.AUTO: ABNORMAL
HOLD SPECIMEN: NORMAL
HOLD SPECIMEN: NORMAL
HYALINE CASTS UR QL AUTO: ABNORMAL /LPF
IMM GRANULOCYTES # BLD AUTO: 0.03 10*3/MM3 (ref 0–0.05)
IMM GRANULOCYTES NFR BLD AUTO: 0.3 % (ref 0–0.5)
KETONES UR QL STRIP: NEGATIVE
LEUKOCYTE ESTERASE UR QL STRIP.AUTO: NEGATIVE
LIPASE SERPL-CCNC: 22 U/L (ref 13–60)
LYMPHOCYTES # BLD AUTO: 3.57 10*3/MM3 (ref 0.7–3.1)
LYMPHOCYTES NFR BLD AUTO: 31.7 % (ref 19.6–45.3)
MCH RBC QN AUTO: 32 PG (ref 26.6–33)
MCHC RBC AUTO-ENTMCNC: 34 G/DL (ref 31.5–35.7)
MCV RBC AUTO: 94 FL (ref 79–97)
MONOCYTES # BLD AUTO: 0.94 10*3/MM3 (ref 0.1–0.9)
MONOCYTES NFR BLD AUTO: 8.3 % (ref 5–12)
NEUTROPHILS NFR BLD AUTO: 58.4 % (ref 42.7–76)
NEUTROPHILS NFR BLD AUTO: 6.58 10*3/MM3 (ref 1.7–7)
NITRITE UR QL STRIP: NEGATIVE
NRBC BLD AUTO-RTO: 0 /100 WBC (ref 0–0.2)
PH UR STRIP.AUTO: 7 [PH] (ref 5–8)
PLATELET # BLD AUTO: 279 10*3/MM3 (ref 140–450)
PMV BLD AUTO: 10.1 FL (ref 6–12)
POTASSIUM SERPL-SCNC: 3.4 MMOL/L (ref 3.5–5.2)
PROT SERPL-MCNC: 7.2 G/DL (ref 6–8.5)
PROT UR QL STRIP: NEGATIVE
RBC # BLD AUTO: 4.16 10*6/MM3 (ref 3.77–5.28)
RBC # UR STRIP: ABNORMAL /HPF
REF LAB TEST METHOD: ABNORMAL
SODIUM SERPL-SCNC: 138 MMOL/L (ref 136–145)
SP GR UR STRIP: <=1.005 (ref 1–1.03)
SQUAMOUS #/AREA URNS HPF: ABNORMAL /HPF
UROBILINOGEN UR QL STRIP: ABNORMAL
WBC # UR STRIP: ABNORMAL /HPF
WBC NRBC COR # BLD: 11.27 10*3/MM3 (ref 3.4–10.8)
WHOLE BLOOD HOLD COAG: NORMAL
WHOLE BLOOD HOLD SPECIMEN: NORMAL

## 2023-05-23 PROCEDURE — 80053 COMPREHEN METABOLIC PANEL: CPT

## 2023-05-23 PROCEDURE — 99283 EMERGENCY DEPT VISIT LOW MDM: CPT

## 2023-05-23 PROCEDURE — 85025 COMPLETE CBC W/AUTO DIFF WBC: CPT

## 2023-05-23 PROCEDURE — 96374 THER/PROPH/DIAG INJ IV PUSH: CPT

## 2023-05-23 PROCEDURE — 83690 ASSAY OF LIPASE: CPT

## 2023-05-23 PROCEDURE — 84702 CHORIONIC GONADOTROPIN TEST: CPT

## 2023-05-23 PROCEDURE — 25010000002 ONDANSETRON PER 1 MG: Performed by: NURSE PRACTITIONER

## 2023-05-23 PROCEDURE — 74177 CT ABD & PELVIS W/CONTRAST: CPT

## 2023-05-23 PROCEDURE — 36415 COLL VENOUS BLD VENIPUNCTURE: CPT

## 2023-05-23 PROCEDURE — 25510000001 IOPAMIDOL PER 1 ML: Performed by: NURSE PRACTITIONER

## 2023-05-23 PROCEDURE — 81001 URINALYSIS AUTO W/SCOPE: CPT

## 2023-05-23 RX ORDER — SODIUM CHLORIDE 0.9 % (FLUSH) 0.9 %
10 SYRINGE (ML) INJECTION AS NEEDED
Status: DISCONTINUED | OUTPATIENT
Start: 2023-05-23 | End: 2023-05-23 | Stop reason: HOSPADM

## 2023-05-23 RX ORDER — ALUMINA, MAGNESIA, AND SIMETHICONE 2400; 2400; 240 MG/30ML; MG/30ML; MG/30ML
15 SUSPENSION ORAL ONCE
Status: COMPLETED | OUTPATIENT
Start: 2023-05-23 | End: 2023-05-23

## 2023-05-23 RX ORDER — ONDANSETRON 2 MG/ML
4 INJECTION INTRAMUSCULAR; INTRAVENOUS ONCE
Status: COMPLETED | OUTPATIENT
Start: 2023-05-23 | End: 2023-05-23

## 2023-05-23 RX ORDER — ONDANSETRON 4 MG/1
4 TABLET, ORALLY DISINTEGRATING ORAL EVERY 8 HOURS PRN
Qty: 15 TABLET | Refills: 0 | Status: SHIPPED | OUTPATIENT
Start: 2023-05-23

## 2023-05-23 RX ADMIN — IOPAMIDOL 100 ML: 755 INJECTION, SOLUTION INTRAVENOUS at 16:40

## 2023-05-23 RX ADMIN — ALUMINUM HYDROXIDE, MAGNESIUM HYDROXIDE, AND DIMETHICONE 15 ML: 400; 400; 40 SUSPENSION ORAL at 18:08

## 2023-05-23 RX ADMIN — ONDANSETRON 4 MG: 2 INJECTION INTRAMUSCULAR; INTRAVENOUS at 16:13

## 2023-05-23 RX ADMIN — SODIUM CHLORIDE 1000 ML: 9 INJECTION, SOLUTION INTRAVENOUS at 16:13

## 2023-05-23 NOTE — ED PROVIDER NOTES
Time: 3:39 PM EDT  Date of encounter:  5/23/2023  Independent Historian/Clinical History and Information was obtained by:   Patient  Chief Complaint   Patient presents with   • Nausea   • Vomiting   • Abdominal Pain   • Diarrhea       History is limited by: N/A    History of Present Illness:  Patient is a 37 y.o. year old female who presents to the emergency department for evaluation of abdominal pain, nausea, without vomiting, decreased appetite, bloating, metallic taste, diarrhea. Symptoms began a few days ago, woke her from sleep and had vomiting x1 day that has now subsided. Denies fever/chills.     HPI by Aung Valenzuela PA-C  Patient is a 37-year-old female presenting today due to abdominal bloating, nausea and vomiting along with mild diarrhea for the past couple days.  Patient states that she has not been able to pass gas.  She states she has a metallic taste in her mouth.  Patient states she is scheduled to have upper and lower scope in July.  Patient denies recent travel or changes of food.  She denies recent antibiotic use, surgery or changes in medications.       Patient Care Team  Primary Care Provider: Alyssa King MD    Past Medical History:     Allergies   Allergen Reactions   • Amoxicillin Swelling and Rash   • Morphine Hives   • Penicillin G Swelling   • Penicillin V Swelling     Past Medical History:   Diagnosis Date   • Anxiety    • GERD (gastroesophageal reflux disease)    • Hormone imbalance    • Mass of left breast      Past Surgical History:   Procedure Laterality Date   • APPENDECTOMY     • BREAST LUMPECTOMY Left 9/20/2022    Procedure: Excision of left breast mass;  Surgeon: Savana Gaitan MD;  Location: [facility];  Service: General;  Laterality: Left;   • TONSILLECTOMY       Family History   Problem Relation Age of Onset   • Liver cancer Mother 50        started in gallbladder, they removed that and it went to liver   • Colon cancer Paternal Grandfather    • Malig  Hyperthermia Neg Hx    • Breast cancer Neg Hx    • Ovarian cancer Neg Hx    • Uterine cancer Neg Hx    • Melanoma Neg Hx        Home Medications:  Prior to Admission medications    Medication Sig Start Date End Date Taking? Authorizing Provider   cetirizine (zyrTEC) 10 MG tablet Take 1 tablet by mouth Daily for 30 days. 4/5/23 5/5/23  Alyssa King MD   fluticasone (FLONASE) 50 MCG/ACT nasal spray 2 sprays into the nostril(s) as directed by provider Daily. 4/5/23   Alyssa King MD   metFORMIN ER (GLUCOPHAGE-XR) 750 MG 24 hr tablet Take 1 tablet by mouth Daily With Breakfast for 30 days. 4/19/23 5/19/23  Alyssa King MD   nicotine (NICODERM CQ) 21 MG/24HR patch Place 1 patch on the skin as directed by provider Daily. 5/4/23   Alyssa King MD   Probiotic Product (Align) 4 MG capsule Take 4 mg by mouth Daily. 4/18/23   Margie Garcia APRN   spironolactone (Aldactone) 50 MG tablet Take 1 tablet by mouth Daily for 30 days. 4/5/23 5/5/23  Alyssa King MD   vitamin B-12 (CYANOCOBALAMIN) 1000 MCG tablet Take 1 tablet by mouth Daily for 90 days. 5/10/23 8/8/23  Alyssa King MD   vitamin D (ERGOCALCIFEROL) 1.25 MG (11546 UT) capsule capsule Take 1 capsule by mouth 1 (One) Time Per Week for 12 doses. 4/19/23 7/6/23  Alyssa King MD        Social History:   Social History     Tobacco Use   • Smoking status: Some Days     Packs/day: 0.50     Years: 20.00     Pack years: 10.00     Types: Cigarettes   • Smokeless tobacco: Never   • Tobacco comments:     1 pack every 3 days off and on since 17   Vaping Use   • Vaping Use: Every day   • Substances: Nicotine, NO VAPING 24 HR PRIOR TO SURGERY   • Devices: Disposable   Substance Use Topics   • Alcohol use: Yes     Comment: occasional   • Drug use: Yes     Types: Marijuana         Review of Systems:  Review of Systems   Constitutional: Positive for appetite change and fatigue. Negative for fever.   HENT: Negative.    Eyes: Negative.   "  Respiratory: Negative.    Cardiovascular: Negative.    Gastrointestinal: Positive for abdominal distention, abdominal pain, diarrhea and nausea. Negative for vomiting.   Endocrine: Negative.    Genitourinary: Negative.  Negative for dysuria and hematuria.   Musculoskeletal: Negative.    Skin: Negative.    Allergic/Immunologic: Negative.    Neurological: Negative.    Hematological: Negative.    Psychiatric/Behavioral: Negative.         Physical Exam:  /71 (BP Location: Right arm)   Pulse 88   Temp 98.1 °F (36.7 °C) (Oral)   Resp 16   Ht 165.1 cm (65\")   Wt 69.7 kg (153 lb 10.6 oz)   LMP  (LMP Unknown)   SpO2 100%   BMI 25.57 kg/m²     Physical Exam  Vitals and nursing note reviewed.   Constitutional:       Appearance: Normal appearance. She is normal weight.   HENT:      Head: Normocephalic and atraumatic.      Nose: Nose normal.      Mouth/Throat:      Mouth: Mucous membranes are moist.   Eyes:      Extraocular Movements: Extraocular movements intact.      Conjunctiva/sclera: Conjunctivae normal.      Pupils: Pupils are equal, round, and reactive to light.   Cardiovascular:      Rate and Rhythm: Normal rate and regular rhythm.      Heart sounds: Normal heart sounds.   Pulmonary:      Effort: Pulmonary effort is normal.      Breath sounds: Normal breath sounds.   Abdominal:      General: Abdomen is flat. Bowel sounds are normal. There is distension.      Palpations: Abdomen is soft.      Tenderness: There is no abdominal tenderness. There is no right CVA tenderness, left CVA tenderness, guarding or rebound.   Musculoskeletal:         General: Normal range of motion.      Cervical back: Normal range of motion and neck supple.   Skin:     General: Skin is warm and dry.   Neurological:      General: No focal deficit present.      Mental Status: She is alert and oriented to person, place, and time.   Psychiatric:         Mood and Affect: Mood normal.         Behavior: Behavior normal.            "       Procedures:  Procedures      Medical Decision Making:      Comorbidities that affect care:    None    External Notes reviewed:    None      The following orders were placed and all results were independently analyzed by me:  Orders Placed This Encounter   Procedures   • CT Abdomen Pelvis With Contrast   • S[place] Draw   • Comprehensive Metabolic Panel   • Lipase   • Urinalysis With Microscopic If Indicated (No Culture) - Urine, Clean Catch   • hCG, Quantitative, Pregnancy   • CBC Auto Differential   • Urinalysis, Microscopic Only - Urine, Clean Catch   • NPO Diet NPO Type: Strict NPO   • Undress & Gown   • Insert Peripheral IV   • Insert Peripheral IV   • CBC & Differential   • Green Top (Gel)   • Lavender Top   • Gold Top - SST   • Light Blue Top       Medications Given in the Emergency Department:  Medications   sodium chloride 0.9 % flush 10 mL (has no administration in time range)   sodium chloride 0.9 % flush 10 mL (has no administration in time range)   aluminum-magnesium hydroxide-simethicone (MAALOX MAX) 400-400-40 MG/5ML suspension 15 mL (has no administration in time range)   sodium chloride 0.9 % bolus 1,000 mL (0 mL Intravenous Stopped 5/23/23 1716)   ondansetron (ZOFRAN) injection 4 mg (4 mg Intravenous Given 5/23/23 1613)   iopamidol (ISOVUE-370) 76 % injection 100 mL (100 mL Intravenous Given 5/23/23 1640)        ED Course:    The patient was initially evaluated in the triage area where orders were placed. The patient was later dispositioned by Aung Valenzuela PA-C.      The patient was advised to stay for completion of workup which includes but is not limited to communication of labs and radiological results, reassessment and plan. The patient was advised that leaving prior to disposition by a provider could result in critical findings that are not communicated to the patient.     ED Course as of 05/23/23 1754   Tue May 23, 2023   9609 The patient was seen and examined by Ashlie valerio  MAXINE Vela, while in triage. Orders placed. Patient is awaiting disposition.   [AR]      ED Course User Index  [AR] Ashlie Vela APRN       Labs:    Lab Results (last 24 hours)     Procedure Component Value Units Date/Time    CBC & Differential [[id number]]  (Abnormal) Collected: 05/23/23 1522    Specimen: Blood Updated: 05/23/23 1538    Narrative:      The following orders were created for panel order CBC & Differential.  Procedure                               Abnormality         Status                     ---------                               -----------         ------                     CBC Auto Differential[[id number]]        Abnormal            Final result                 Please view results for these tests on the individual orders.    Comprehensive Metabolic Panel [[id number]]  (Abnormal) Collected: 05/23/23 1522    Specimen: Blood Updated: 05/23/23 1600     Glucose 96 mg/dL      BUN 6 mg/dL      Creatinine 0.71 mg/dL      Sodium 138 mmol/L      Potassium 3.4 mmol/L      Chloride 102 mmol/L      CO2 25.1 mmol/L      Calcium 8.7 mg/dL      Total Protein 7.2 g/dL      Albumin 4.2 g/dL      ALT (SGPT) 15 U/L      AST (SGOT) 14 U/L      Alkaline Phosphatase 57 U/L      Total Bilirubin 0.8 mg/dL      Globulin 3.0 gm/dL      A/G Ratio 1.4 g/dL      BUN/Creatinine Ratio 8.5     Anion Gap 10.9 mmol/L      eGFR 113.2 mL/min/1.73     Narrative:      GFR Normal >60  Chronic Kidney Disease <60  Kidney Failure <15      Lipase [[id number]]  (Normal) Collected: 05/23/23 1522    Specimen: Blood Updated: 05/23/23 1600     Lipase 22 U/L     hCG, Quantitative, Pregnancy [[id number]] Collected: 05/23/23 1522    Specimen: Blood Updated: 05/23/23 1557     HCG Quantitative <0.50 mIU/mL     Narrative:      HCG Ranges by Gestational Age    Females - non-pregnant premenopausal   </= 1mIU/mL HCG  Females - postmenopausal               </= 7mIU/mL HCG    3 Weeks       5.4   -      72 mIU/mL  4 Weeks      10.2   -     708  mIU/mL  5 Weeks       217   -   8,245 mIU/mL  6 Weeks       152   -  32,177 mIU/mL  7 Weeks     4,059   - 153,767 mIU/mL  8 Weeks    31,366   - 149,094 mIU/mL  9 Weeks    59,109   - 135,901 mIU/mL  10 Weeks   44,186   - 170,409 mIU/mL  12 Weeks   27,107   - 201,615 mIU/mL  14 Weeks   24,302   -  93,646 mIU/mL  15 Weeks   12,540   -  69,747 mIU/mL  16 Weeks    8,904   -  55,332 mIU/mL  17 Weeks    8,240   -  51,793 mIU/mL  18 Weeks    9,649   -  55,271 mIU/mL      CBC Auto Differential [[id number]]  (Abnormal) Collected: 05/23/23 1522    Specimen: Blood Updated: 05/23/23 1538     WBC 11.27 10*3/mm3      RBC 4.16 10*6/mm3      Hemoglobin 13.3 g/dL      Hematocrit 39.1 %      MCV 94.0 fL      MCH 32.0 pg      MCHC 34.0 g/dL      RDW 12.5 %      RDW-SD 42.9 fl      MPV 10.1 fL      Platelets 279 10*3/mm3      Neutrophil % 58.4 %      Lymphocyte % 31.7 %      Monocyte % 8.3 %      Eosinophil % 0.9 %      Basophil % 0.4 %      Immature Grans % 0.3 %      Neutrophils, Absolute 6.58 10*3/mm3      Lymphocytes, Absolute 3.57 10*3/mm3      Monocytes, Absolute 0.94 10*3/mm3      Eosinophils, Absolute 0.10 10*3/mm3      Basophils, Absolute 0.05 10*3/mm3      Immature Grans, Absolute 0.03 10*3/mm3      nRBC 0.0 /100 WBC     Urinalysis With Microscopic If Indicated (No Culture) - Urine, Clean Catch [[id number]]  (Abnormal) Collected: 05/23/23 1528    Specimen: Urine, Clean Catch Updated: 05/23/23 1620     Color, UA Yellow     Appearance, UA Clear     pH, UA 7.0     Specific Gravity, UA <=1.005     Glucose, UA Negative     Ketones, UA Negative     Bilirubin, UA Negative     Blood, UA Small (1+)     Protein, UA Negative     Leuk Esterase, UA Negative     Nitrite, UA Negative     Urobilinogen, UA 0.2 E.U./dL    Urinalysis, Microscopic Only - Urine, Clean Catch [[id number]]  (Abnormal) Collected: 05/23/23 1528    Specimen: Urine, Clean Catch Updated: 05/23/23 1620     RBC, UA 0-2 /HPF      WBC, UA 0-2 /HPF      Bacteria, UA 1+ /HPF       Squamous Epithelial Cells, UA 3-6 /HPF      Hyaline Casts, UA None Seen /LPF      Methodology Manual Light Microscopy           Imaging:    CT Abdomen Pelvis With Contrast    Result Date: 5/23/2023  PROCEDURE: CT ABDOMEN PELVIS W CONTRAST  COMPARISON: [facility], CT, ABDOMEN/PELVIS WITH CONTRAST, 7/27/2018, 8:44.  INDICATIONS: abdominal pain, abdominal blaoting, eval for obstruction, watery stools  PROTOCOL:   Standard imaging protocol performed    RADIATION:   DLP: 424.1 mGy*cm   Automated exposure control was utilized to minimize radiation dose. CONTRAST: 100 cc Isovue 370 I.V.  TECHNIQUE: Axial images of the abdomen and pelvis with intravenous contrast.  ABDOMEN:  The lung bases are clear.  The liver, spleen, pancreas and adrenal glands are normal.  There is a 1 cm cyst in the upper pole right kidney.  The left kidney is normal.  There are no inflammatory changes around the gallbladder.  PELVIS:  Prior appendectomy.  No evidence of bowel obstruction, perforation or abscess.  There is mild colonic diverticulosis.  No CT evidence of colitis or diverticulitis.  The abdominal aorta has a normal caliber.  The urinary bladder is normal.  No acute osseous abnormalities are identified.  IMPRESSION:  No acute findings.  TIFFANY VENTURA MD       Electronically Signed and Approved By: TIFFANY VENTURA MD on 5/23/2023 at 16:55                 Differential Diagnosis and Discussion:      Abdominal Pain: Based on the patient's signs and symptoms, I considered abdominal aortic aneurysm, small bowel obstruction, pancreatitis, acute cholecystitis, acute appendecitis, peptic ulcer disease, gastritis, colitis, endocrine disorders, irritable bowel syndrome and other differential diagnosis an etiology of the patient's abdominal pain.  Diarrhea: Differential diagnosis includes but is not limited to malabsorption syndrome, bacterial infection, carcinoid syndrome, pancreatic hypersecretion, viral infection, celiac  sprue, Crohn's disease, ulcerative colitis, ischemic colitis, colitis, hypermotility, and irritable bowel syndrome.  Vomiting: Differential diagnosis includes but is not limited to migraine, labyrinthine disorders, psychogenic, metabolic and endocrine causes, peptic ulcer, gastric outlet obstruction, gastritis, gastroenteritis, appendicitis, intestinal obstruction, paralytic ileus, food poisoning, cholecystitis, acute hepatitis, acute pancreatitis, acute febrile illness, and myocardial infarction.    All labs were reviewed and interpreted by me.  CT scan radiology impression was interpreted by me.    [facility]     Patient Care Considerations:    CONSULT: I considered consulting GI, however no obstruction seen on CT       Consultants/Shared Management Plan:    None    Social Determinants of Health:    Patient has presented with family members who are responsible, reliable and will ensure follow up care.      Disposition and Care Coordination:    Discharged: The patient is suitable and stable for discharge with no need for consideration of observation or admission.    I have explained the patient´s condition, diagnoses and treatment plan based on the information available to me at this time. I have answered questions and addressed any concerns. The patient has a good  understanding of the patient´s diagnosis, condition, and treatment plan as can be expected at this point. The vital signs have been stable. The patient´s condition is stable and appropriate for discharge from the emergency department.      The patient will pursue further outpatient evaluation with the primary care physician or other designated or consulting physician as outlined in the discharge instructions. They are agreeable to this plan of care and follow-up instructions have been explained in detail. The patient has received these instructions in written format and have expressed an understanding of the discharge instructions. The patient is aware that  any significant change in condition or worsening of symptoms should prompt an immediate return to this or the closest emergency department or call to 911.  I have explained discharge medications and the need for follow up with the patient/caretakers. This was also printed in the discharge instructions. Patient was discharged with the following medications and follow up:      Medication List      New Prescriptions    ondansetron ODT 4 MG disintegrating tablet  Commonly known as: ZOFRAN-ODT  Place 1 tablet on the tongue Every 8 (Eight) Hours As Needed for Nausea or Vomiting.           Where to Get Your Medications      These medications were sent to [id number] - [place], [place] - [address] AT [facility] OF [address] & [place] - [phone]  - [phone]   [address] [place] [address]    Phone: [phone]   · ondansetron ODT 4 MG disintegrating tablet      No follow-up provider specified.     Final diagnoses:   Renal cyst, right   Diverticulosis        ED Disposition     ED Disposition   Discharge    Condition   Stable    Comment   --             This medical record created using voice recognition software.           Aung Valenzuela PA-C  05/23/23 3559

## 2023-05-23 NOTE — DISCHARGE INSTRUCTIONS
Your CT was negative for bowel obstruction, it did however show a 1 cm cyst on the right kidney along with diverticulosis.  You can take Imodium over-the-counter for diarrhea along with drinking lots of fluids such as water, Gatorade and Pedialyte  Please eat a bland diet until your symptoms start to improve such as bread, applesauce and bananas  You can also take Gas-X which is available over-the-counter to pass gas please follow-up with your PCP and I have given you referral to GI

## 2023-05-31 ENCOUNTER — TELEPHONE (OUTPATIENT)
Dept: OBSTETRICS AND GYNECOLOGY | Facility: CLINIC | Age: 38
End: 2023-05-31

## 2023-05-31 NOTE — TELEPHONE ENCOUNTER
Caller: Andreina Barnes    Relationship: Self    Best call back number: [phone]    What was the call regarding: PT CALLING TO R/S APPT SHE HAD TO CANCEL LAST WEEK D/T BEING ILL. APPT FOR AUB AND PELVIC PAIN. PT HAS CANCELLED PRIOR APPT AS WELL. SCHEDULED 6/14/23 @350 WITH DR. RYAN, JUST WANTED TO MAKE SURE THIS WAS OKAY WITH THE ISSUES SHE HAS GOING ON AND WAS UNABLE TO WT TO MAKE SURE.

## 2023-06-11 DIAGNOSIS — R73.03 PREDIABETES: ICD-10-CM

## 2023-06-12 RX ORDER — METFORMIN HYDROCHLORIDE 750 MG/1
TABLET, EXTENDED RELEASE ORAL
Qty: 30 TABLET | Refills: 0 | Status: SHIPPED | OUTPATIENT
Start: 2023-06-12

## 2023-06-13 NOTE — PROGRESS NOTES
GYN Visit    Chief Complaint   Patient presents with    Discuss Irregular Cycles      5-6 months w/ irregular cycles, pt unsure when last cycle was, pelvic pain       HPI:   37 y.o.  with vasectomy here for irregular bleeding.  Pt states LMP 3mos ago just spotted. When spots can last 2-3 weeks.  Had pelvic ultrasound  nml.  Pt has occ pelvic pain like period cramps but doesn't bleed.        History: PMHx, Meds, Allergies, PSHx, Social Hx, and POBHx all reviewed and updated.    PHYSICAL EXAM:  /74   Pulse 87   Wt 71.2 kg (157 lb)   LMP  (LMP Unknown)   Breastfeeding No   BMI 26.13 kg/m²   General- NAD, alert and oriented, appropriate  Psych- Normal mood, good memory        External genitalia- Normal female, no lesions  Urethra/meatus- Normal, no masses, non tender, no prolapse  Bladder- Normal, no masses, non tender, no prolapse  Vagina- Normal, no atrophy, no lesions, no discharge, no prolapse  Cvx- Normal, no lesions, no discharge, No cervical motion tenderness  Uterus- Normal size, shape & consistency.  Non tender, mobile, & no prolapse  Adnexa- No mass, non tender  Anus/Rectum/Perineum- Not performed    Lymphatic- No palpable neck, axillary, or groin nodes  Ext- No edema, no cyanosis    Skin- No lesions, no rashes, no acanthosis nigricans  CT Abdomen Pelvis With Contrast (2023 16:39)  RADIOLOGY - SCAN - [facility] PELVIS (03/15/2023)  ASSESSMENT AND PLAN:  Diagnoses and all orders for this visit:    1. Abnormal uterine bleeding (AUB) (Primary)  -     Progesterone (Prometrium) 200 MG capsule; Take one x 10 days.  Then take days 16-25 of cycle every month  Dispense: 30 capsule; Refill: 4            Follow Up:  Return in about 3 months (around 2023), or med ck.          Cheryl Harvey,   2023    [facility] [place] [place] [facility]  [facility] [place] [address]  Dept: [phone]  Dept Fax: [phone]  Loc:  [phone]  Loc Fax: [phone]

## 2023-06-14 ENCOUNTER — TELEPHONE (OUTPATIENT)
Dept: UROLOGY | Facility: CLINIC | Age: 38
End: 2023-06-14

## 2023-06-14 ENCOUNTER — OFFICE VISIT (OUTPATIENT)
Dept: OBSTETRICS AND GYNECOLOGY | Facility: CLINIC | Age: 38
End: 2023-06-14
Payer: COMMERCIAL

## 2023-06-14 VITALS
HEART RATE: 87 BPM | BODY MASS INDEX: 26.13 KG/M2 | WEIGHT: 157 LBS | SYSTOLIC BLOOD PRESSURE: 108 MMHG | DIASTOLIC BLOOD PRESSURE: 74 MMHG

## 2023-06-14 DIAGNOSIS — N93.9 ABNORMAL UTERINE BLEEDING (AUB): Primary | ICD-10-CM

## 2023-06-14 PROCEDURE — 99214 OFFICE O/P EST MOD 30 MIN: CPT | Performed by: OBSTETRICS & GYNECOLOGY

## 2023-06-14 RX ORDER — PROGESTERONE 200 MG/1
CAPSULE ORAL
Qty: 30 CAPSULE | Refills: 4 | Status: SHIPPED | OUTPATIENT
Start: 2023-06-14

## 2023-06-14 NOTE — TELEPHONE ENCOUNTER
Caller: Andreina Barnes    Relationship to patient: Self    Best call back number: [phone]    Patient is needing: PER [employer] WORK [employer]OW SEND TELEPHONE ENCOUNTER FOR SAME DAY CANCEL APPT. THANK YOU.

## 2023-06-26 NOTE — ED PROVIDER NOTES
Time: 22:57 EST  Arrived by: Private vehicle  Chief Complaint: Flulike symptoms  History provided by: Patient  History is limited by: N/A    History of Present Illness:  Patient is a 36 y.o. year old female that presents to the emergency department with flu sx since yesterday am      Fever  Max temp prior to arrival:  103  Temp source:  Oral  Severity:  Mild  Onset quality:  Sudden  Duration:  2 days  Timing:  Intermittent  Progression:  Waxing and waning  Chronicity:  New  Relieved by:  Acetaminophen and ibuprofen  Worsened by:  Nothing  Associated symptoms: chills, cough ( dry), ear pain, myalgias, rhinorrhea and sore throat    Associated symptoms: no diarrhea, no dysuria, no headaches, no nausea, no rash and no vomiting    Sore Throat  Associated symptoms: chills, cough ( dry), ear pain, fever (103.4), rhinorrhea and shortness of breath    Associated symptoms: no headaches and no rash          Similar Symptoms Previously: No  Recently seen: neg flu and covid monday      Patient Care Team  Primary Care Provider: Clary Adam    Past Medical History:     Allergies   Allergen Reactions   • Morphine Other (See Comments) and Unknown - High Severity   • Penicillin G Unknown - High Severity   • Penicillin V Other (See Comments)   • Amoxicillin Rash, Other (See Comments) and Unknown - High Severity     rash     Past Medical History:   Diagnosis Date   • Hormone imbalance      Past Surgical History:   Procedure Laterality Date   • APPENDECTOMY     • TONSILLECTOMY       History reviewed. No pertinent family history.    Home Medications:  Prior to Admission medications    Not on File        Social History:   PT  reports that she has been smoking. She has never used smokeless tobacco. She reports that she does not drink alcohol and does not use drugs.    Record Review:  I have reviewed the patient's records in [facility].     Review of Systems  Review of Systems   Constitutional: Positive for appetite change ( decreased), chills  "and fever (103.4).   HENT: Positive for ear pain, rhinorrhea and sore throat.    Eyes: Negative.    Respiratory: Positive for cough ( dry) and shortness of breath.    Cardiovascular: Negative.    Gastrointestinal: Negative.  Negative for diarrhea, nausea and vomiting.   Endocrine: Negative.    Genitourinary: Negative for dysuria and flank pain.   Musculoskeletal: Positive for myalgias.   Skin: Positive for wound ( stepped on nail and went through tennis shoe). Negative for rash.   Neurological: Negative.  Negative for headaches.   Hematological: Negative.    Psychiatric/Behavioral: Negative.         Physical Exam  /79   Pulse 67   Temp 98.3 °F (36.8 °C) (Oral)   Resp 18   Ht 165.1 cm (65\")   Wt 64.5 kg (142 lb 3.2 oz)   LMP 12/27/2021   SpO2 96%   BMI 23.66 kg/m²     Physical Exam  Vitals and nursing note reviewed.   Constitutional:       General: She is not in acute distress.     Appearance: Normal appearance. She is well-developed. She is not toxic-appearing.   HENT:      Head: Normocephalic and atraumatic.      Right Ear: Tympanic membrane and ear canal normal.      Left Ear: Tympanic membrane and ear canal normal.      Nose: No congestion or rhinorrhea.      Mouth/Throat:      Mouth: Mucous membranes are moist.      Pharynx: Uvula midline.      Tonsils: 0 on the right. 0 on the left.   Eyes:      General: No scleral icterus.  Cardiovascular:      Rate and Rhythm: Normal rate and regular rhythm.      Pulses: Normal pulses.      Heart sounds: Normal heart sounds.   Pulmonary:      Effort: Pulmonary effort is normal. No respiratory distress.      Breath sounds: Normal breath sounds.   Abdominal:      General: Abdomen is flat. Bowel sounds are normal.      Palpations: Abdomen is soft.      Tenderness: There is no abdominal tenderness.   Musculoskeletal:         General: Normal range of motion.      Cervical back: Normal range of motion and neck supple.   Skin:     General: Skin is warm and dry.      " "Comments: Puncture wound on plantar surface of right foot shows no signs of infection or abscess.  Tiny scabbed puncture nontender   Neurological:      Mental Status: She is alert and oriented to person, place, and time. Mental status is at baseline.   Psychiatric:         Mood and Affect: Mood normal.         Behavior: Behavior normal.            ED Course  /79   Pulse 67   Temp 98.3 °F (36.8 °C) (Oral)   Resp 18   Ht 165.1 cm (65\")   Wt 64.5 kg (142 lb 3.2 oz)   LMP 12/27/2021   SpO2 96%   BMI 23.66 kg/m²   Results for orders placed or performed during the hospital encounter of 01/25/22   Rapid Strep A Screen - Swab, Throat    Specimen: Throat; Swab   Result Value Ref Range    Strep A Ag Negative Negative   Influenza Antigen, Rapid - Swab, Nasopharynx    Specimen: Nasopharynx; Swab   Result Value Ref Range    Influenza A Ag, EIA Negative Negative    Influenza B Ag, EIA Negative Negative   Urinalysis With Microscopic If Indicated (No Culture) -    Specimen: Urine   Result Value Ref Range    Color, UA Yellow Yellow, Straw    Appearance, UA Clear Clear    pH, UA 6.5 5.0 - 8.0    Specific Gravity, UA 1.008 1.005 - 1.030    Glucose, UA Negative Negative    Ketones, UA Negative Negative    Bilirubin, UA Negative Negative    Blood, UA Trace (A) Negative    Protein, UA Negative Negative    Leuk Esterase, UA Negative Negative    Nitrite, UA Negative Negative    Urobilinogen, UA 0.2 E.U./dL 0.2 - 1.0 E.U./dL   Urinalysis, Microscopic Only - Urine, Clean Catch    Specimen: Urine   Result Value Ref Range    RBC, UA 0-2 (A) None Seen /HPF    WBC, UA None Seen None Seen /HPF    Bacteria, UA None Seen None Seen /HPF    Squamous Epithelial Cells, UA 3-6 (A) None Seen, 0-2 /HPF    Hyaline Casts, UA None Seen None Seen /LPF    Methodology Manual Light Microscopy    Green Top (Gel)   Result Value Ref Range    Extra Tube Hold for add-ons.    Lavender Top   Result Value Ref Range    Extra Tube hold for add-on    Gold Top " - SST   Result Value Ref Range    Extra Tube Hold for add-ons.    Light Blue Top   Result Value Ref Range    Extra Tube hold for add-on      Medications   sodium chloride 0.9 % flush 10 mL (has no administration in time range)   traMADol (ULTRAM) tablet 50 mg (50 mg Oral Given 1/25/22 2108)     XR Chest 1 View    Result Date: 1/25/2022  Narrative: PROCEDURE: XR CHEST 1 VW  COMPARISON: [facility], , CHEST PA/AP & LAT 2V, 1/06/2020, 10:29.  INDICATIONS: fever/cough  FINDINGS:  A single AP upright portable chest radiograph was performed.  No cardiac enlargement is seen.  No acute infiltrate is appreciated.  No pleural effusion or pneumothorax is identified.  No significant interval change is seen since the prior study.      Impression:   No acute infiltrate is appreciated.       SHAHNAZ DIALLO JR, MD       Electronically Signed and Approved By: SHAHNAZ DIALLO JR, MD on 1/25/2022 at 22:33             US Breast Bilateral Limited    Result Date: 1/14/2022  Narrative: PROCEDURE: MAMMO DIAGNOSTIC DIGITAL TOMOSYNTHESIS BILATERAL W CAD, 1/14/2022, 12:33 US BREAST BILATERAL LIMITED, 1/14/2022, 13:29  COMPARISON:  [facility], , DIG DIAG BILAT CHIQUI W 3D CAESAR, 10/19/2018, 14:25. INDICATIONS: SCREENING  FINDINGS:  Mammogram:  Right breast:  1 cm oval mass upper-outer right breast.  No suspicious microcalcifications or architectural distortion.  Several intramammary lymph nodes visualized.  Left breast:  Triangular marker designates area of palpable complaint upper-outer left breast where there is a macrolobular 1.8 cm mammographic mass.  A 2nd 0.8 cm mass upper inner left breast visualized.  Ultrasound:  Right breast:  High-resolution focused right breast ultrasound at the 11 o'clock position 5 cm from the nipple demonstrates a parallel 1 cm solid mass most compatible with fibroadenoma.  Left breast:  High-resolution focused left breast ultrasound at the 10 o'clock position 4 cm from the  nipple demonstrates a 0.8 x 0.5 cm septated mildly complex cyst.  At the 12 o'clock position 8 cm from the nipple (palpable) is a macrolobular 1.5 x 1.2 cm mass with mild hypervascularity.  No posterior acoustic shadowing.  This may represent a fibroadenoma.  IMPRESSION: Left breast:  1.5 cm macrolobular mass 1200 hours 8 cm from the nipple (palpable).  Recommend ultrasound-guided core biopsy for definitive diagnosis.  Left breast:  0.8 cm complex cyst 1000 hours 4 cm from the nipple.  Recommend 6 month sonographic follow-up.  Right breast:  1 cm solid mass 1100 hours 5 cm from the nipple is a probably benign fibroadenoma.  Short interval 6 month follow-up recommended.  If the patient so desires this can be sampled at the time of left breast biopsy.  All the above findings and recommendations discussed directly with the patient.  At this time the patient prefers to proceed with biopsy of the bilateral solid masses and has been scheduled accordingly.  Findings and recommendations will be communicated/faxed to Clary Adam on 1/14/2022.    RECOMMENDATION(S):  SHORT TERM FOLLOW-UP ULTRASOUND LEFT BREAST IN 6 MONTHS.   ULTRASOUND-GUIDED CORE BIOPSY: BILATERAL BREASTS   BIRADS:  DIAGNOSTIC CATEGORY 4--SUSPICIOUS   BREAST COMPOSITION: Scattered areas fibroglandular density.  PLEASE NOTE:  A NORMAL MAMMOGRAM DOES NOT EXCLUDE THE POSSIBILITY OF BREAST CANCER. ANY CLINICALLY SUSPICIOUS PALPABLE LUMP SHOULD BE BIOPSIED.      Prashant Arteaga M.D.       Electronically Signed and Approved By: Prashant Arteaga M.D. on 1/14/2022 at 14:32             Mammo Diagnostic Digital Tomosynthesis Bilateral With CAD    Result Date: 1/14/2022  Narrative: PROCEDURE: MAMMO DIAGNOSTIC DIGITAL TOMOSYNTHESIS BILATERAL W CAD, 1/14/2022, 12:33 US BREAST BILATERAL LIMITED, 1/14/2022, 13:29  COMPARISON:  [facility], [facility], DIG DIAG BILAT CHIQUI W 3D CAESAR, 10/19/2018, 14:25. INDICATIONS: SCREENING  FINDINGS:  Mammogram:  Right  breast:  1 cm oval mass upper-outer right breast.  No suspicious microcalcifications or architectural distortion.  Several intramammary lymph nodes visualized.  Left breast:  Triangular marker designates area of palpable complaint upper-outer left breast where there is a macrolobular 1.8 cm mammographic mass.  A 2nd 0.8 cm mass upper inner left breast visualized.  Ultrasound:  Right breast:  High-resolution focused right breast ultrasound at the 11 o'clock position 5 cm from the nipple demonstrates a parallel 1 cm solid mass most compatible with fibroadenoma.  Left breast:  High-resolution focused left breast ultrasound at the 10 o'clock position 4 cm from the nipple demonstrates a 0.8 x 0.5 cm septated mildly complex cyst.  At the 12 o'clock position 8 cm from the nipple (palpable) is a macrolobular 1.5 x 1.2 cm mass with mild hypervascularity.  No posterior acoustic shadowing.  This may represent a fibroadenoma.  IMPRESSION: Left breast:  1.5 cm macrolobular mass 1200 hours 8 cm from the nipple (palpable).  Recommend ultrasound-guided core biopsy for definitive diagnosis.  Left breast:  0.8 cm complex cyst 1000 hours 4 cm from the nipple.  Recommend 6 month sonographic follow-up.  Right breast:  1 cm solid mass 1100 hours 5 cm from the nipple is a probably benign fibroadenoma.  Short interval 6 month follow-up recommended.  If the patient so desires this can be sampled at the time of left breast biopsy.  All the above findings and recommendations discussed directly with the patient.  At this time the patient prefers to proceed with biopsy of the bilateral solid masses and has been scheduled accordingly.  Findings and recommendations will be communicated/faxed to Clary Adam on 1/14/2022.    RECOMMENDATION(S):  SHORT TERM FOLLOW-UP ULTRASOUND LEFT BREAST IN 6 MONTHS.   ULTRASOUND-GUIDED CORE BIOPSY: BILATERAL BREASTS   BIRADS:  DIAGNOSTIC CATEGORY 4--SUSPICIOUS   BREAST COMPOSITION: Scattered areas fibroglandular  density.  PLEASE NOTE:  A NORMAL MAMMOGRAM DOES NOT EXCLUDE THE POSSIBILITY OF BREAST CANCER. ANY CLINICALLY SUSPICIOUS PALPABLE LUMP SHOULD BE BIOPSIED.      Prashant Arteaga M.D.       Electronically Signed and Approved By: Prashant Arteaga M.D. on 1/14/2022 at 14:32               Medical Decision Making:                     MDM  Number of Diagnoses or Management Options  Viral syndrome  Diagnosis management comments: The patient presents to the ED with a cough. The patient is resting comfortably and feels better, is alert and in no distress.  On re-examination the patient does not appear toxic and has no meningeal signs (including a negative Kernig and Brudzinski sign), and there's no intractable vomiting, respiratory distress and no apparent pain. Based on the history, exam, diagnostic testing and reassessment, the patient has no signs of meningitis, significant pneumonia, pyelonephritis, sepsis or other acute serious bacterial infections, or other significant pathology to warrant further testing, continued ED treatment, admission or specialist evaluation. The patient's vital signs have been stable. The patient's condition is stable and is appropriate for discharge. The patient´s symptoms are consistent with a viral upper respiratory infection and antibiotics are not indicated. The () was counseled to return to the ED for re-evaluation for worsening cough, shortness of breath, uncontrollable headache, uncontrollable fever, altered mental status, or any symptoms which cause them concern. The patient will pursue further outpatient evaluation with the primary care physician or other designated or consultant physician as indicated in the discharge instructions.         Amount and/or Complexity of Data Reviewed  Clinical lab tests: reviewed and ordered  Tests in the radiology section of CPT®: reviewed and ordered  Tests in the medicine section of CPT®: ordered and reviewed    Risk of Complications, Morbidity,  and/or Mortality  Presenting problems: low  Diagnostic procedures: low  Management options: low    Patient Progress  Patient progress: stable       Final diagnoses:   Viral syndrome        Disposition:  ED Disposition     ED Disposition Condition Comment    Discharge Stable            Ella Curtis, APRN  01/25/22 4574     1-2 cups/cans per day

## 2023-07-17 NOTE — PRE-PROCEDURE INSTRUCTIONS
"Instructed on date and arrival time of 0800. Come to entrance \"C\". Must have  over age 18 to drive home.  May have two visitors; however, children under 12 must stay in waiting room.  Discussed clear liquid diet (no red or purple) and bowel prep.  May take medications as usual except for blood thinners, diabetic medications, and weight loss medications.  Bring list of medications.  Verbalized understanding of instructions given.  Phone number given for questions or concerns.  "

## 2023-07-20 ENCOUNTER — HOSPITAL ENCOUNTER (OUTPATIENT)
Facility: HOSPITAL | Age: 38
Setting detail: HOSPITAL OUTPATIENT SURGERY
Discharge: HOME | End: 2023-07-20
Attending: INTERNAL MEDICINE | Admitting: INTERNAL MEDICINE
Payer: COMMERCIAL

## 2023-07-20 VITALS
WEIGHT: 155.2 LBS | SYSTOLIC BLOOD PRESSURE: 120 MMHG | BODY MASS INDEX: 25.86 KG/M2 | OXYGEN SATURATION: 100 % | DIASTOLIC BLOOD PRESSURE: 89 MMHG | HEART RATE: 74 BPM | RESPIRATION RATE: 19 BRPM | HEIGHT: 65 IN | TEMPERATURE: 97.9 F

## 2023-07-20 DIAGNOSIS — K59.00 CONSTIPATION, UNSPECIFIED CONSTIPATION TYPE: ICD-10-CM

## 2023-07-20 DIAGNOSIS — R19.7 DIARRHEA, UNSPECIFIED TYPE: ICD-10-CM

## 2023-07-20 DIAGNOSIS — R12 HEARTBURN: ICD-10-CM

## 2023-07-20 DIAGNOSIS — R14.0 ABDOMINAL BLOATING: ICD-10-CM

## 2023-07-20 LAB
B-HCG UR QL: NEGATIVE
GLUCOSE BLDC GLUCOMTR-MCNC: 111 MG/DL (ref 70–99)

## 2023-07-20 PROCEDURE — 81025 URINE PREGNANCY TEST: CPT | Performed by: INTERNAL MEDICINE

## 2023-07-20 PROCEDURE — 43239 EGD BIOPSY SINGLE/MULTIPLE: CPT | Performed by: INTERNAL MEDICINE

## 2023-07-20 PROCEDURE — 88305 TISSUE EXAM BY PATHOLOGIST: CPT | Performed by: INTERNAL MEDICINE

## 2023-07-20 PROCEDURE — 82948 REAGENT STRIP/BLOOD GLUCOSE: CPT

## 2023-07-20 PROCEDURE — 45380 COLONOSCOPY AND BIOPSY: CPT | Performed by: INTERNAL MEDICINE

## 2023-07-20 RX ORDER — SODIUM CHLORIDE, SODIUM LACTATE, POTASSIUM CHLORIDE, CALCIUM CHLORIDE 600; 310; 30; 20 MG/100ML; MG/100ML; MG/100ML; MG/100ML
30 INJECTION, SOLUTION INTRAVENOUS CONTINUOUS
Status: DISCONTINUED | OUTPATIENT
Start: 2023-07-20 | End: 2023-07-20 | Stop reason: HOSPADM

## 2023-07-20 RX ADMIN — SODIUM CHLORIDE, POTASSIUM CHLORIDE, SODIUM LACTATE AND CALCIUM CHLORIDE 30 ML/HR: 600; 310; 30; 20 INJECTION, SOLUTION INTRAVENOUS at 09:12

## 2023-07-20 NOTE — H&P
Pre Procedure History & Physical    Chief Complaint:   Heartburn, bloating, constipation, diarrhea    Subjective     HPI:   36 yo F here for eval of heartburn, bloating, constipation, diarrhea.    Past Medical History:   Past Medical History:   Diagnosis Date    Anxiety     GERD (gastroesophageal reflux disease)     Hormone imbalance     Mass of left breast        Past Surgical History:  Past Surgical History:   Procedure Laterality Date    APPENDECTOMY      BREAST LUMPECTOMY Left 9/20/2022    Procedure: Excision of left breast mass;  Surgeon: Savana Gaitan MD;  [place] [facility] [place] [facility] [place]  Service: General;  Laterality: Left;    TONSILLECTOMY         Family History:  Family History   Problem Relation Age of Onset    Liver cancer Mother 50        started in gallbladder, they removed that and it went to liver    Colon cancer Paternal Grandfather     Diabetes Maternal Grandmother     Diabetes Maternal Uncle     Malig Hyperthermia Neg Hx     Breast cancer Neg Hx     Ovarian cancer Neg Hx     Uterine cancer Neg Hx     Melanoma Neg Hx     Clotting disorder Neg Hx     Deep vein thrombosis Neg Hx     Pulmonary embolism Neg Hx        Social History:   reports that she has been smoking cigarettes. She has a 5.00 pack-year smoking history. She has never used smokeless tobacco. She reports current alcohol use. She reports current drug use. Drug: Marijuana.    Medications:   Medications Prior to Admission   Medication Sig Dispense Refill Last Dose    cetirizine (zyrTEC) 10 MG tablet Take 1 tablet by mouth Daily for 30 days. 30 tablet 2     fluticasone (FLONASE) 50 MCG/ACT nasal spray 2 sprays into the nostril(s) as directed by provider Daily. 16 g 2     metFORMIN ER (GLUCOPHAGE-XR) 750 MG 24 hr tablet Take 1 tablet by mouth Daily With Breakfast. 90 tablet 1     omeprazole (priLOSEC) 20 MG capsule TAKE 1 CAPSULE BY MOUTH DAILY 90 capsule 1     ondansetron ODT (ZOFRAN-ODT) 4 MG disintegrating tablet Place 1 tablet on the  tongue Every 8 (Eight) Hours As Needed for Nausea or Vomiting. 15 tablet 0     Progesterone (Prometrium) 200 MG capsule Take one x 10 days.  Then take days 16-25 of cycle every month (Patient not taking: Reported on 7/13/2023) 30 capsule 4     spironolactone (ALDACTONE) 50 MG tablet Take 1 tablet by mouth Daily for 90 days. 90 tablet 1     vitamin B-12 (CYANOCOBALAMIN) 1000 MCG tablet Take 1 tablet by mouth Daily for 90 days. 90 tablet 1        Allergies:  Amoxicillin, Morphine, Penicillin g, Penicillin v, and Nicoderm [nicotine]    ROS:    Pertinent items are noted in HPI, all other systems reviewed and negative     Objective     Weight 70.4 kg (155 lb 3.3 oz), not currently breastfeeding.    Physical Exam   Constitutional: Pt is oriented to person, place, and time and well-developed, well-nourished, and in no distress.   Mouth/Throat: Oropharynx is clear and moist.   Neck: Normal range of motion.   Cardiovascular: Normal rate, regular rhythm and normal heart sounds.    Pulmonary/Chest: Effort normal and breath sounds normal.   Abdominal: Soft. Nontender  Skin: Skin is warm and dry.   Psychiatric: Mood, memory, affect and judgment normal.     Assessment & Plan     Diagnosis:  Heartburn, bloating, constipation, diarrhea    Anticipated Surgical Procedure:  EGD/colonoscopy    The risks, benefits, and alternatives of this procedure have been discussed with the patient or the responsible party- the patient understands and agrees to proceed.

## 2023-07-24 ENCOUNTER — TELEPHONE (OUTPATIENT)
Dept: GASTROENTEROLOGY | Facility: CLINIC | Age: 38
End: 2023-07-24
Payer: COMMERCIAL

## 2023-07-24 NOTE — TELEPHONE ENCOUNTER
----- Message from MAXINE Campoverde sent at 7/24/2023  1:51 PM EDT -----  Gastric biopsies normal.  Normal colonoscopy  Please schedule for f/u.

## 2023-07-26 ENCOUNTER — TELEPHONE (OUTPATIENT)
Dept: INTERNAL MEDICINE | Facility: CLINIC | Age: 38
End: 2023-07-26
Payer: COMMERCIAL

## 2023-07-26 DIAGNOSIS — Z12.31 ENCOUNTER FOR SCREENING MAMMOGRAM FOR MALIGNANT NEOPLASM OF BREAST: Primary | ICD-10-CM

## 2023-07-27 ENCOUNTER — HOSPITAL ENCOUNTER (OUTPATIENT)
Dept: MAMMOGRAPHY | Facility: HOSPITAL | Age: 38
Discharge: HOME | End: 2023-07-27
Payer: COMMERCIAL

## 2023-08-16 ENCOUNTER — TELEPHONE (OUTPATIENT)
Dept: INTERNAL MEDICINE | Facility: CLINIC | Age: 38
End: 2023-08-16
Payer: COMMERCIAL

## 2023-08-16 DIAGNOSIS — N63.20 MASS OF LEFT BREAST, UNSPECIFIED QUADRANT: Primary | ICD-10-CM

## 2023-08-16 NOTE — TELEPHONE ENCOUNTER
Caller: ALBA AT [facility]    Relationship: Other    Best call back number: [phone]    What orders are you requesting (i.e. lab or imaging): LEFT BREAST ULTRASOUND    In what timeframe would the patient need to come in: BY END OF DAY 8/16/23    Where will you receive your lab/imaging services: [facility]    Additional notes: ALBA STATES TO PLEASE CALL HER AND LET HER KNOW THAT THE ORDERS HAVE BEEN PLACED SO THAT SHE CAN ALERT THE APPROPRIATE [facility] DEPARTMENT.

## 2023-08-17 ENCOUNTER — HOSPITAL ENCOUNTER (OUTPATIENT)
Dept: MAMMOGRAPHY | Facility: HOSPITAL | Age: 38
Discharge: HOME | End: 2023-08-17
Payer: COMMERCIAL

## 2023-08-31 ENCOUNTER — HOSPITAL ENCOUNTER (OUTPATIENT)
Dept: MAMMOGRAPHY | Facility: HOSPITAL | Age: 38
Discharge: HOME | End: 2023-08-31
Payer: COMMERCIAL

## 2023-08-31 ENCOUNTER — HOSPITAL ENCOUNTER (OUTPATIENT)
Dept: ULTRASOUND IMAGING | Facility: HOSPITAL | Age: 38
Discharge: HOME | End: 2023-08-31
Payer: COMMERCIAL

## 2023-08-31 PROCEDURE — G0279 TOMOSYNTHESIS, MAMMO: HCPCS

## 2023-08-31 PROCEDURE — 76642 ULTRASOUND BREAST LIMITED: CPT

## 2023-08-31 PROCEDURE — 77066 DX MAMMO INCL CAD BI: CPT

## 2023-09-05 PROBLEM — E61.1 IRON DEFICIENCY: Status: ACTIVE | Noted: 2023-09-05

## 2023-09-05 PROBLEM — G43.009 MIGRAINE WITHOUT AURA, NOT REFRACTORY: Status: ACTIVE | Noted: 2023-09-05

## 2023-09-05 PROBLEM — E78.5 HYPERLIPIDEMIA: Status: ACTIVE | Noted: 2023-09-05

## 2023-09-05 PROBLEM — R32 URINARY INCONTINENCE: Status: ACTIVE | Noted: 2023-09-05

## 2023-09-05 PROBLEM — R73.01 IMPAIRED FASTING GLUCOSE: Status: ACTIVE | Noted: 2023-09-05

## 2023-09-05 PROBLEM — R15.9 INCONTINENCE OF FECES: Status: ACTIVE | Noted: 2023-09-05

## 2023-09-05 PROBLEM — F34.1 DYSTHYMIA: Status: ACTIVE | Noted: 2023-09-05

## 2023-09-05 PROBLEM — E03.9 HYPOTHYROIDISM: Status: ACTIVE | Noted: 2023-09-05

## 2023-09-05 PROBLEM — E55.9 VITAMIN D DEFICIENCY: Status: ACTIVE | Noted: 2023-09-05

## 2023-09-05 PROBLEM — E25.8: Status: ACTIVE | Noted: 2023-09-05

## 2023-09-05 PROBLEM — E53.8 B12 DEFICIENCY: Status: ACTIVE | Noted: 2023-09-05

## 2023-09-18 ENCOUNTER — TELEPHONE (OUTPATIENT)
Dept: OBSTETRICS AND GYNECOLOGY | Facility: CLINIC | Age: 38
End: 2023-09-18
Payer: COMMERCIAL

## 2023-09-18 DIAGNOSIS — R92.8 ABNORMAL MAMMOGRAM OF LEFT BREAST: Primary | ICD-10-CM

## 2023-09-29 ENCOUNTER — HOSPITAL ENCOUNTER (EMERGENCY)
Facility: HOSPITAL | Age: 38
Discharge: HOME | End: 2023-09-29
Attending: EMERGENCY MEDICINE
Payer: COMMERCIAL

## 2023-09-29 ENCOUNTER — APPOINTMENT (OUTPATIENT)
Dept: CT IMAGING | Facility: HOSPITAL | Age: 38
End: 2023-09-29
Payer: COMMERCIAL

## 2023-09-29 VITALS
DIASTOLIC BLOOD PRESSURE: 85 MMHG | TEMPERATURE: 98.4 F | WEIGHT: 159.17 LBS | RESPIRATION RATE: 20 BRPM | HEART RATE: 67 BPM | OXYGEN SATURATION: 96 % | HEIGHT: 65 IN | SYSTOLIC BLOOD PRESSURE: 120 MMHG | BODY MASS INDEX: 26.52 KG/M2

## 2023-09-29 DIAGNOSIS — R07.0 THROAT PAIN IN ADULT: Primary | ICD-10-CM

## 2023-09-29 LAB
ALBUMIN SERPL-MCNC: 4.1 G/DL (ref 3.5–5.2)
ALBUMIN/GLOB SERPL: 1.6 G/DL
ALP SERPL-CCNC: 49 U/L (ref 39–117)
ALT SERPL W P-5'-P-CCNC: 11 U/L (ref 1–33)
ANION GAP SERPL CALCULATED.3IONS-SCNC: 9.4 MMOL/L (ref 5–15)
AST SERPL-CCNC: 12 U/L (ref 1–32)
BASOPHILS # BLD AUTO: 0.06 10*3/MM3 (ref 0–0.2)
BASOPHILS NFR BLD AUTO: 0.5 % (ref 0–1.5)
BILIRUB SERPL-MCNC: 0.7 MG/DL (ref 0–1.2)
BUN SERPL-MCNC: 6 MG/DL (ref 6–20)
BUN/CREAT SERPL: 8.8 (ref 7–25)
CALCIUM SPEC-SCNC: 9 MG/DL (ref 8.6–10.5)
CHLORIDE SERPL-SCNC: 105 MMOL/L (ref 98–107)
CO2 SERPL-SCNC: 23.6 MMOL/L (ref 22–29)
CREAT SERPL-MCNC: 0.68 MG/DL (ref 0.57–1)
DEPRECATED RDW RBC AUTO: 44.6 FL (ref 37–54)
EGFRCR SERPLBLD CKD-EPI 2021: 115.2 ML/MIN/1.73
EOSINOPHIL # BLD AUTO: 0.33 10*3/MM3 (ref 0–0.4)
EOSINOPHIL NFR BLD AUTO: 2.9 % (ref 0.3–6.2)
ERYTHROCYTE [DISTWIDTH] IN BLOOD BY AUTOMATED COUNT: 12.9 % (ref 12.3–15.4)
GLOBULIN UR ELPH-MCNC: 2.5 GM/DL
GLUCOSE SERPL-MCNC: 94 MG/DL (ref 65–99)
HCT VFR BLD AUTO: 39.7 % (ref 34–46.6)
HGB BLD-MCNC: 12.9 G/DL (ref 12–15.9)
HOLD SPECIMEN: NORMAL
HOLD SPECIMEN: NORMAL
IMM GRANULOCYTES # BLD AUTO: 0.04 10*3/MM3 (ref 0–0.05)
IMM GRANULOCYTES NFR BLD AUTO: 0.4 % (ref 0–0.5)
LYMPHOCYTES # BLD AUTO: 3.46 10*3/MM3 (ref 0.7–3.1)
LYMPHOCYTES NFR BLD AUTO: 30.4 % (ref 19.6–45.3)
MCH RBC QN AUTO: 30.7 PG (ref 26.6–33)
MCHC RBC AUTO-ENTMCNC: 32.5 G/DL (ref 31.5–35.7)
MCV RBC AUTO: 94.5 FL (ref 79–97)
MONOCYTES # BLD AUTO: 0.88 10*3/MM3 (ref 0.1–0.9)
MONOCYTES NFR BLD AUTO: 7.7 % (ref 5–12)
NEUTROPHILS NFR BLD AUTO: 58.1 % (ref 42.7–76)
NEUTROPHILS NFR BLD AUTO: 6.62 10*3/MM3 (ref 1.7–7)
NRBC BLD AUTO-RTO: 0 /100 WBC (ref 0–0.2)
PLATELET # BLD AUTO: 235 10*3/MM3 (ref 140–450)
PMV BLD AUTO: 10.7 FL (ref 6–12)
POTASSIUM SERPL-SCNC: 3.9 MMOL/L (ref 3.5–5.2)
PROT SERPL-MCNC: 6.6 G/DL (ref 6–8.5)
RBC # BLD AUTO: 4.2 10*6/MM3 (ref 3.77–5.28)
S PYO AG THROAT QL: NEGATIVE
SODIUM SERPL-SCNC: 138 MMOL/L (ref 136–145)
WBC NRBC COR # BLD: 11.39 10*3/MM3 (ref 3.4–10.8)
WHOLE BLOOD HOLD COAG: NORMAL
WHOLE BLOOD HOLD SPECIMEN: NORMAL

## 2023-09-29 PROCEDURE — 85025 COMPLETE CBC W/AUTO DIFF WBC: CPT | Performed by: EMERGENCY MEDICINE

## 2023-09-29 PROCEDURE — 80053 COMPREHEN METABOLIC PANEL: CPT | Performed by: EMERGENCY MEDICINE

## 2023-09-29 PROCEDURE — 99285 EMERGENCY DEPT VISIT HI MDM: CPT

## 2023-09-29 PROCEDURE — 70491 CT SOFT TISSUE NECK W/DYE: CPT

## 2023-09-29 PROCEDURE — 25510000001 IOPAMIDOL PER 1 ML: Performed by: EMERGENCY MEDICINE

## 2023-09-29 PROCEDURE — 87880 STREP A ASSAY W/OPTIC: CPT | Performed by: EMERGENCY MEDICINE

## 2023-09-29 PROCEDURE — 87081 CULTURE SCREEN ONLY: CPT | Performed by: EMERGENCY MEDICINE

## 2023-09-29 RX ADMIN — IOPAMIDOL 100 ML: 755 INJECTION, SOLUTION INTRAVENOUS at 15:53

## 2023-09-29 NOTE — ED PROVIDER NOTES
Time: 1:56 PM EDT  Date of encounter:  9/29/2023  Independent Historian/Clinical History and Information was obtained by:   Patient    History is limited by: N/A    Chief Complaint: Difficulty swallowing      History of Present Illness:  Patient is a 38 y.o. year old female who presents to the emergency department for evaluation of difficulty swallowing for the past 2 days.  Patient reports that she feels as though she has muscle spasms.  Patient denies fever and chills.  Patient has no chest pain or shortness of breath.  Patient has no cough hemoptysis.  Patient denies overt throat pain.    HPI    Patient Care Team  Primary Care Provider: Alyssa King MD    Past Medical History:     Allergies   Allergen Reactions    Amoxicillin Swelling and Rash    Morphine Hives    Penicillin G Swelling    Penicillin V Swelling    Nicoderm [Nicotine] Rash     Nicotine patches     Past Medical History:   Diagnosis Date    Anxiety     Dysthymia 9/5/2023    GERD (gastroesophageal reflux disease)     Hormone imbalance     Iron deficiency 9/5/2023    Mass of left breast     Migraine without aura, not refractory 9/5/2023     Past Surgical History:   Procedure Laterality Date    APPENDECTOMY      BREAST LUMPECTOMY Left 9/20/2022    Procedure: Excision of left breast mass;  Surgeon: Savana Gaitan MD;  Location: [facility] OR [facility] [place];  Service: General;  Laterality: Left;    COLONOSCOPY N/A 7/20/2023    Procedure: COLONOSCOPY WITH BIOPSIES;  Surgeon: Jelly Borrero MD;  Location: [facility] ENDOSCOPY;  Service: Gastroenterology;  Laterality: N/A;  DIVERTICULOSIS    ENDOSCOPY N/A 7/20/2023    Procedure: ESOPHAGOGASTRODUODENOSCOPY WITH BIOPSIES;  Surgeon: Jelly oBrrero MD;  Location: [facility] ENDOSCOPY;  Service: Gastroenterology;  Laterality: N/A;  HIATAL HERNIA, GASTRITIS, ESOPHAGEAL NODULE    TONSILLECTOMY       Family History   Problem Relation Age of Onset    Liver cancer Mother 50        started in gallbladder, they  removed that and it went to liver    Colon cancer Paternal Grandfather     Diabetes Maternal Grandmother     Diabetes Maternal Uncle     Maltray Hyperthermia Neg Hx     Breast cancer Neg Hx     Ovarian cancer Neg Hx     Uterine cancer Neg Hx     Melanoma Neg Hx     Clotting disorder Neg Hx     Deep vein thrombosis Neg Hx     Pulmonary embolism Neg Hx        Home Medications:  Prior to Admission medications    Medication Sig Start Date End Date Taking? Authorizing Provider   cetirizine (zyrTEC) 10 MG tablet Take 1 tablet by mouth Daily for 30 days. 4/5/23 9/29/23 Yes Alyssa King MD   cholecalciferol (VITAMIN D3) 1.25 MG (48455 UT) capsule TAKE 1 CAPSULE BY MOUTH ONCE A WEEK AS DIRECTED for 84   Yes Provider, MD Anthony   fluticasone (FLONASE) 50 MCG/ACT nasal spray 2 sprays into the nostril(s) as directed by provider Daily. 4/5/23  Yes Alyssa King MD   omeprazole (priLOSEC) 20 MG capsule TAKE 1 CAPSULE BY MOUTH DAILY 7/5/23  Yes Alyssa King MD   ondansetron ODT (ZOFRAN-ODT) 4 MG disintegrating tablet Place 1 tablet on the tongue Every 8 (Eight) Hours As Needed for Nausea or Vomiting. 5/23/23  Yes Aung Valenzuela PA-C   spironolactone (ALDACTONE) 50 MG tablet Take 1 tablet by mouth Daily for 90 days. 7/13/23 10/11/23 Yes Alyssa King MD   metFORMIN ER (GLUCOPHAGE-XR) 750 MG 24 hr tablet Take 1 tablet by mouth Daily With Breakfast. 7/13/23   Alyssa King MD   Progesterone (Prometrium) 200 MG capsule Take one x 10 days.  Then take days 16-25 of cycle every month  Patient not taking: Reported on 7/13/2023 6/14/23   Cheryl Harvey DO        Social History:   Social History     Tobacco Use    Smoking status: Some Days     Packs/day: 0.25     Years: 20.00     Pack years: 5.00     Types: Cigarettes    Smokeless tobacco: Never    Tobacco comments:     Down to 1 pack a week   Vaping Use    Vaping Use: Every day    Substances: Nicotine, NO VAPING 24 HR PRIOR TO SURGERY    Devices:  "Disposable   Substance Use Topics    Alcohol use: Yes     Comment: occasional    Drug use: Yes     Types: Marijuana         Review of Systems:  Review of Systems   Constitutional:  Negative for chills and fever.   HENT:  Negative for congestion, rhinorrhea and sore throat.    Eyes:  Negative for pain and visual disturbance.   Respiratory:  Negative for apnea, cough, chest tightness and shortness of breath.    Cardiovascular:  Negative for chest pain and palpitations.   Gastrointestinal:  Negative for abdominal pain, diarrhea, nausea and vomiting.   Genitourinary:  Negative for difficulty urinating and dysuria.   Musculoskeletal:  Negative for joint swelling and myalgias.   Skin:  Negative for color change.   Neurological:  Negative for seizures and headaches.   Psychiatric/Behavioral: Negative.     All other systems reviewed and are negative.     Physical Exam:  /85 (BP Location: Left arm)   Pulse 67   Temp 98.4 °F (36.9 °C) (Oral)   Resp 20   Ht 165.1 cm (65\")   Wt 72.2 kg (159 lb 2.8 oz)   LMP 08/31/2023 (Approximate)   SpO2 96%   BMI 26.49 kg/m²     Physical Exam  Vitals and nursing note reviewed.   Constitutional:       General: She is not in acute distress.     Appearance: Normal appearance. She is not toxic-appearing.   HENT:      Head: Normocephalic and atraumatic.      Jaw: There is normal jaw occlusion.   Eyes:      General: Lids are normal.      Extraocular Movements: Extraocular movements intact.      Conjunctiva/sclera: Conjunctivae normal.      Pupils: Pupils are equal, round, and reactive to light.   Cardiovascular:      Rate and Rhythm: Normal rate and regular rhythm.      Pulses: Normal pulses.      Heart sounds: Normal heart sounds.   Pulmonary:      Effort: Pulmonary effort is normal. No respiratory distress.      Breath sounds: Normal breath sounds. No wheezing or rhonchi.   Abdominal:      General: Abdomen is flat.      Palpations: Abdomen is soft.      Tenderness: There is no " abdominal tenderness. There is no guarding or rebound.   Musculoskeletal:         General: Normal range of motion.      Cervical back: Normal range of motion and neck supple.      Right lower leg: No edema.      Left lower leg: No edema.   Skin:     General: Skin is warm and dry.   Neurological:      Mental Status: She is alert and oriented to person, place, and time. Mental status is at baseline.   Psychiatric:         Mood and Affect: Mood normal.                Procedures:  Procedures      Medical Decision Making:      Comorbidities that affect care:    Smoking    External Notes reviewed:    Previous Clinic Note: Patient was seen in clinic for heartburn and constipation      The following orders were placed and all results were independently analyzed by me:  Orders Placed This Encounter   Procedures    Rapid Strep A Screen - Swab, Throat    Beta Strep Culture, Throat - Swab, Throat    CT Soft Tissue Neck With Contrast    Comprehensive Metabolic Panel    CBC Auto Differential    [place] Draw    CBC & Differential    Green Top (Gel)    Lavender Top    Gold Top - SST    Light Blue Top       Medications Given in the Emergency Department:  Medications   iopamidol (ISOVUE-370) 76 % injection 100 mL (100 mL Intravenous Given 9/29/23 1553)        ED Course:         Labs:    Lab Results (last 24 hours)       Procedure Component Value Units Date/Time    CBC & Differential [[id number]]  (Abnormal) Collected: 09/29/23 1427    Specimen: Blood Updated: 09/29/23 1437    Narrative:      The following orders were created for panel order CBC & Differential.  Procedure                               Abnormality         Status                     ---------                               -----------         ------                     CBC Auto Differential[[id number]]        Abnormal            Final result                 Please view results for these tests on the individual orders.    Comprehensive Metabolic Panel [[id number]] Collected:  09/29/23 1427    Specimen: Blood Updated: 09/29/23 1503     Glucose 94 mg/dL      BUN 6 mg/dL      Creatinine 0.68 mg/dL      Sodium 138 mmol/L      Potassium 3.9 mmol/L      Chloride 105 mmol/L      CO2 23.6 mmol/L      Calcium 9.0 mg/dL      Total Protein 6.6 g/dL      Albumin 4.1 g/dL      ALT (SGPT) 11 U/L      AST (SGOT) 12 U/L      Alkaline Phosphatase 49 U/L      Total Bilirubin 0.7 mg/dL      Globulin 2.5 gm/dL      A/G Ratio 1.6 g/dL      BUN/Creatinine Ratio 8.8     Anion Gap 9.4 mmol/L      eGFR 115.2 mL/min/1.73     Narrative:      GFR Normal >60  Chronic Kidney Disease <60  Kidney Failure <15      CBC Auto Differential [[id number]]  (Abnormal) Collected: 09/29/23 1427    Specimen: Blood Updated: 09/29/23 1437     WBC 11.39 10*3/mm3      RBC 4.20 10*6/mm3      Hemoglobin 12.9 g/dL      Hematocrit 39.7 %      MCV 94.5 fL      MCH 30.7 pg      MCHC 32.5 g/dL      RDW 12.9 %      RDW-SD 44.6 fl      MPV 10.7 fL      Platelets 235 10*3/mm3      Neutrophil % 58.1 %      Lymphocyte % 30.4 %      Monocyte % 7.7 %      Eosinophil % 2.9 %      Basophil % 0.5 %      Immature Grans % 0.4 %      Neutrophils, Absolute 6.62 10*3/mm3      Lymphocytes, Absolute 3.46 10*3/mm3      Monocytes, Absolute 0.88 10*3/mm3      Eosinophils, Absolute 0.33 10*3/mm3      Basophils, Absolute 0.06 10*3/mm3      Immature Grans, Absolute 0.04 10*3/mm3      nRBC 0.0 /100 WBC     Rapid Strep A Screen - Swab, Throat [[id number]]  (Normal) Collected: 09/29/23 1428    Specimen: Swab from Throat Updated: 09/29/23 1501     Strep A Ag Negative    Beta Strep Culture, Throat - Swab, Throat [[id number]] Collected: 09/29/23 1428    Specimen: Swab from Throat Updated: 09/29/23 1501             Imaging:    CT Soft Tissue Neck With Contrast    Result Date: 9/29/2023  PROCEDURE: CT SOFT TISSUE NECK W CONTRAST  COMPARISON: None  INDICATIONS: neck pain, Trouble swallowing  PROTOCOL:   Standard imaging protocol performed    RADIATION:   DLP: 203mGy*cm    Automated exposure control was utilized to minimize radiation dose. CONTRAST: 70cc Isovue 370 I.V.  TECHNIQUE: After obtaining the patient's consent, CT images were obtained with non-ionic intravenous contrast material.   FINDINGS:  The adenoids are prominent.  The palatine tonsils appear within normal limits.  The lingual tonsils are prominent bilaterally.  No tonsillar abscess is identified.  The cervical and visualized upper thoracic esophagus appear unremarkable.  The larynx appears normal.  The bilateral submandibular and parotid glands appear unremarkable.  Sub cm cervical lymph nodes are noted bilaterally, nonpathologic by size criteria.  The visualized brain appears unremarkable.  Visualized upper lung fields are clear.  No focal osseous lesion is seen.        1. Bilateral prominence of the lingual tonsils and adenoids.  Correlate with physical exam.     Amando Shukla M.D.       Electronically Signed and Approved By: Amando Shukla M.D. on 9/29/2023 at 16:17                Differential Diagnosis and Discussion:    Sore Throat: Differential diagnosis includes but is not limited to bacterial infection, viral infection, inhaled irritants, sinus drainage, thyroiditis, epiglottitis, and retropharyngeal abscess.    All labs were reviewed and interpreted by me.  CT scan radiology impression was interpreted by me.    MDM     Amount and/or Complexity of Data Reviewed  Clinical lab tests: reviewed  Tests in the radiology section of CPT®: reviewed       The patient´s CBC that was reviewed and interpreted by me shows no abnormalities of critical concern. Of note, there is no anemia requiring a blood transfusion and the platelet count is acceptable.  The patient´s CMP that was reviewed and interpretted by me shows no abnormalities of critical concern. Of note, the patient´s sodium and potassium are acceptable. The patient´s liver enzymes are unremarkable. The patient´s renal function (creatinine) is preserved. The  patient has a normal anion gap.  Rapid strep is negative.  CT is negative for masses or abscesses.        Patient Care Considerations:    ANTIBIOTICS: I considered prescribing antibiotics as an outpatient however no bacterial focus of infection was found.      Consultants/Shared Management Plan:    None    Social Determinants of Health:    Patient is independent, reliable, and has access to care.       Disposition and Care Coordination:    Discharged: The patient is suitable and stable for discharge with no need for consideration of observation or admission.    I have explained the patient´s condition, diagnoses and treatment plan based on the information available to me at this time. I have answered questions and addressed any concerns. The patient has a good  understanding of the patient´s diagnosis, condition, and treatment plan as can be expected at this point. The vital signs have been stable. The patient´s condition is stable and appropriate for discharge from the emergency department.      The patient will pursue further outpatient evaluation with the primary care physician or other designated or consulting physician as outlined in the discharge instructions. They are agreeable to this plan of care and follow-up instructions have been explained in detail. The patient has received these instructions in written format and have expressed an understanding of the discharge instructions. The patient is aware that any significant change in condition or worsening of symptoms should prompt an immediate return to this or the closest emergency department or call to 911.  I have explained discharge medications and the need for follow up with the patient/caretakers. This was also printed in the discharge instructions. Patient was discharged with the following medications and follow up:      Medication List      No changes were made to your prescriptions during this visit.      Gopal Cavazos MD  [id number]  [place]  [address] [address]  [phone]    In 2 days         Final diagnoses:   Throat pain in adult        ED Disposition       ED Disposition   Discharge    Condition   Stable    Comment   --               This medical record created using voice recognition software.             Alma Schwarz MD  09/29/23 [id number]

## 2023-10-01 LAB — BACTERIA SPEC AEROBE CULT: NORMAL

## 2023-10-10 ENCOUNTER — TELEPHONE (OUTPATIENT)
Dept: OBSTETRICS AND GYNECOLOGY | Facility: CLINIC | Age: 38
End: 2023-10-10
Payer: COMMERCIAL

## 2023-10-23 ENCOUNTER — HOSPITAL ENCOUNTER (EMERGENCY)
Facility: HOSPITAL | Age: 38
Discharge: HOME | End: 2023-10-23
Attending: EMERGENCY MEDICINE | Admitting: EMERGENCY MEDICINE
Payer: COMMERCIAL

## 2023-10-23 ENCOUNTER — APPOINTMENT (OUTPATIENT)
Dept: GENERAL RADIOLOGY | Facility: HOSPITAL | Age: 38
End: 2023-10-23
Payer: COMMERCIAL

## 2023-10-23 VITALS
HEART RATE: 71 BPM | OXYGEN SATURATION: 100 % | RESPIRATION RATE: 20 BRPM | SYSTOLIC BLOOD PRESSURE: 112 MMHG | HEIGHT: 60 IN | WEIGHT: 156.31 LBS | TEMPERATURE: 98 F | BODY MASS INDEX: 30.69 KG/M2 | DIASTOLIC BLOOD PRESSURE: 78 MMHG

## 2023-10-23 DIAGNOSIS — R07.9 CHEST PAIN, UNSPECIFIED TYPE: Primary | ICD-10-CM

## 2023-10-23 LAB
ALBUMIN SERPL-MCNC: 4.4 G/DL (ref 3.5–5.2)
ALBUMIN/GLOB SERPL: 1.6 G/DL
ALP SERPL-CCNC: 57 U/L (ref 39–117)
ALT SERPL W P-5'-P-CCNC: 17 U/L (ref 1–33)
ANION GAP SERPL CALCULATED.3IONS-SCNC: 10.7 MMOL/L (ref 5–15)
AST SERPL-CCNC: 16 U/L (ref 1–32)
BASOPHILS # BLD AUTO: 0.08 10*3/MM3 (ref 0–0.2)
BASOPHILS NFR BLD AUTO: 0.7 % (ref 0–1.5)
BILIRUB SERPL-MCNC: 0.7 MG/DL (ref 0–1.2)
BUN SERPL-MCNC: 9 MG/DL (ref 6–20)
BUN/CREAT SERPL: 10.7 (ref 7–25)
CALCIUM SPEC-SCNC: 9.6 MG/DL (ref 8.6–10.5)
CHLORIDE SERPL-SCNC: 101 MMOL/L (ref 98–107)
CO2 SERPL-SCNC: 25.3 MMOL/L (ref 22–29)
CREAT SERPL-MCNC: 0.84 MG/DL (ref 0.57–1)
D DIMER PPP FEU-MCNC: 0.31 MCGFEU/ML (ref 0–0.5)
DEPRECATED RDW RBC AUTO: 41.1 FL (ref 37–54)
EGFRCR SERPLBLD CKD-EPI 2021: 91.9 ML/MIN/1.73
EOSINOPHIL # BLD AUTO: 0.35 10*3/MM3 (ref 0–0.4)
EOSINOPHIL NFR BLD AUTO: 3.2 % (ref 0.3–6.2)
ERYTHROCYTE [DISTWIDTH] IN BLOOD BY AUTOMATED COUNT: 12.3 % (ref 12.3–15.4)
GEN 5 2HR TROPONIN T REFLEX: <6 NG/L
GLOBULIN UR ELPH-MCNC: 2.8 GM/DL
GLUCOSE SERPL-MCNC: 129 MG/DL (ref 65–99)
HCT VFR BLD AUTO: 40.2 % (ref 34–46.6)
HGB BLD-MCNC: 13.3 G/DL (ref 12–15.9)
HOLD SPECIMEN: NORMAL
HOLD SPECIMEN: NORMAL
IMM GRANULOCYTES # BLD AUTO: 0.04 10*3/MM3 (ref 0–0.05)
IMM GRANULOCYTES NFR BLD AUTO: 0.4 % (ref 0–0.5)
LIPASE SERPL-CCNC: 31 U/L (ref 13–60)
LYMPHOCYTES # BLD AUTO: 4.42 10*3/MM3 (ref 0.7–3.1)
LYMPHOCYTES NFR BLD AUTO: 39.9 % (ref 19.6–45.3)
MAGNESIUM SERPL-MCNC: 2.1 MG/DL (ref 1.6–2.6)
MCH RBC QN AUTO: 30.3 PG (ref 26.6–33)
MCHC RBC AUTO-ENTMCNC: 33.1 G/DL (ref 31.5–35.7)
MCV RBC AUTO: 91.6 FL (ref 79–97)
MONOCYTES # BLD AUTO: 0.81 10*3/MM3 (ref 0.1–0.9)
MONOCYTES NFR BLD AUTO: 7.3 % (ref 5–12)
NEUTROPHILS NFR BLD AUTO: 48.5 % (ref 42.7–76)
NEUTROPHILS NFR BLD AUTO: 5.37 10*3/MM3 (ref 1.7–7)
NRBC BLD AUTO-RTO: 0 /100 WBC (ref 0–0.2)
NT-PROBNP SERPL-MCNC: <36 PG/ML (ref 0–450)
PLATELET # BLD AUTO: 242 10*3/MM3 (ref 140–450)
PMV BLD AUTO: 10.8 FL (ref 6–12)
POTASSIUM SERPL-SCNC: 3.6 MMOL/L (ref 3.5–5.2)
PROT SERPL-MCNC: 7.2 G/DL (ref 6–8.5)
RBC # BLD AUTO: 4.39 10*6/MM3 (ref 3.77–5.28)
SODIUM SERPL-SCNC: 137 MMOL/L (ref 136–145)
TROPONIN T DELTA: NORMAL
TROPONIN T SERPL HS-MCNC: <6 NG/L
WBC NRBC COR # BLD: 11.07 10*3/MM3 (ref 3.4–10.8)
WHOLE BLOOD HOLD COAG: NORMAL
WHOLE BLOOD HOLD SPECIMEN: NORMAL

## 2023-10-23 PROCEDURE — 83735 ASSAY OF MAGNESIUM: CPT

## 2023-10-23 PROCEDURE — 80053 COMPREHEN METABOLIC PANEL: CPT

## 2023-10-23 PROCEDURE — 85379 FIBRIN DEGRADATION QUANT: CPT | Performed by: EMERGENCY MEDICINE

## 2023-10-23 PROCEDURE — 83880 ASSAY OF NATRIURETIC PEPTIDE: CPT

## 2023-10-23 PROCEDURE — 93005 ELECTROCARDIOGRAM TRACING: CPT | Performed by: EMERGENCY MEDICINE

## 2023-10-23 PROCEDURE — 84484 ASSAY OF TROPONIN QUANT: CPT

## 2023-10-23 PROCEDURE — 84484 ASSAY OF TROPONIN QUANT: CPT | Performed by: EMERGENCY MEDICINE

## 2023-10-23 PROCEDURE — 71045 X-RAY EXAM CHEST 1 VIEW: CPT

## 2023-10-23 PROCEDURE — 93005 ELECTROCARDIOGRAM TRACING: CPT

## 2023-10-23 PROCEDURE — 99284 EMERGENCY DEPT VISIT MOD MDM: CPT

## 2023-10-23 PROCEDURE — 83690 ASSAY OF LIPASE: CPT

## 2023-10-23 PROCEDURE — 36415 COLL VENOUS BLD VENIPUNCTURE: CPT

## 2023-10-23 PROCEDURE — 85025 COMPLETE CBC W/AUTO DIFF WBC: CPT

## 2023-10-23 RX ORDER — ASPIRIN 81 MG/1
324 TABLET, CHEWABLE ORAL ONCE
Status: DISCONTINUED | OUTPATIENT
Start: 2023-10-23 | End: 2023-10-23 | Stop reason: HOSPADM

## 2023-10-23 RX ORDER — SODIUM CHLORIDE 0.9 % (FLUSH) 0.9 %
10 SYRINGE (ML) INJECTION AS NEEDED
Status: DISCONTINUED | OUTPATIENT
Start: 2023-10-23 | End: 2023-10-23 | Stop reason: HOSPADM

## 2023-10-23 NOTE — ED PROVIDER NOTES
Time: 6:13 PM EDT  Date of encounter:  10/23/2023  Independent Historian/Clinical History and Information was obtained by:   Patient    History is limited by: N/A    Chief Complaint   Patient presents with    Chest Pain         History of Present Illness:  Patient is a 38 y.o. year old female who presents to the emergency department for evaluation of chest pain that started last night.  Patient states that her lungs are burning. Admits to chills. Denies cough, fever.  Patient denies nausea, vomiting, and diaphoresis.  Patient denies leg pain and swelling.    Patient Care Team  Primary Care Provider: Alyssa King MD    Past Medical History:     Allergies   Allergen Reactions    Amoxicillin Swelling and Rash    Morphine Hives    Penicillin G Swelling    Penicillin V Swelling    Nicoderm [Nicotine] Rash     Nicotine patches     Past Medical History:   Diagnosis Date    Anxiety     Dysthymia 9/5/2023    GERD (gastroesophageal reflux disease)     Hormone imbalance     Iron deficiency 9/5/2023    Mass of left breast     Migraine without aura, not refractory 9/5/2023     Past Surgical History:   Procedure Laterality Date    APPENDECTOMY      BREAST LUMPECTOMY Left 9/20/2022    Procedure: Excision of left breast mass;  Surgeon: Savana Gaitan MD;  [place] [facility] [place] [facility] [place]  Service: General;  Laterality: Left;    COLONOSCOPY N/A 7/20/2023    Procedure: COLONOSCOPY WITH BIOPSIES;  Surgeon: Jelly Borrero MD;  Location: [facility] ENDOSCOPY;  Service: Gastroenterology;  Laterality: N/A;  DIVERTICULOSIS    ENDOSCOPY N/A 7/20/2023    Procedure: ESOPHAGOGASTRODUODENOSCOPY WITH BIOPSIES;  Surgeon: Jelly Borrero MD;  Location: [facility] ENDOSCOPY;  Service: Gastroenterology;  Laterality: N/A;  HIATAL HERNIA, GASTRITIS, ESOPHAGEAL NODULE    TONSILLECTOMY       Family History   Problem Relation Age of Onset    Liver cancer Mother 50        started in gallbladder, they removed that and it went to liver    Colon  cancer Paternal Grandfather     Diabetes Maternal Grandmother     Diabetes Maternal Uncle     Malig Hyperthermia Neg Hx     Breast cancer Neg Hx     Ovarian cancer Neg Hx     Uterine cancer Neg Hx     Melanoma Neg Hx     Clotting disorder Neg Hx     Deep vein thrombosis Neg Hx     Pulmonary embolism Neg Hx        Home Medications:  Prior to Admission medications    Medication Sig Start Date End Date Taking? Authorizing Provider   cetirizine (zyrTEC) 10 MG tablet Take 1 tablet by mouth Daily for 30 days. 4/5/23 9/29/23  Alyssa King MD   cholecalciferol (VITAMIN D3) 1.25 MG (40537 UT) capsule TAKE 1 CAPSULE BY MOUTH ONCE A WEEK AS DIRECTED for 84    Provider, MD Anthony   fluticasone (FLONASE) 50 MCG/ACT nasal spray 2 sprays into the nostril(s) as directed by provider Daily. 4/5/23   Alyssa King MD   metFORMIN ER (GLUCOPHAGE-XR) 750 MG 24 hr tablet Take 1 tablet by mouth Daily With Breakfast. 7/13/23   Alyssa King MD   omeprazole (priLOSEC) 20 MG capsule TAKE 1 CAPSULE BY MOUTH DAILY 7/5/23   Alyssa King MD   ondansetron ODT (ZOFRAN-ODT) 4 MG disintegrating tablet Place 1 tablet on the tongue Every 8 (Eight) Hours As Needed for Nausea or Vomiting. 5/23/23   Aung Valenzuela PA-C   Progesterone (Prometrium) 200 MG capsule Take one x 10 days.  Then take days 16-25 of cycle every month  Patient not taking: Reported on 7/13/2023 6/14/23   Cheryl Harvey DO   spironolactone (ALDACTONE) 50 MG tablet Take 1 tablet by mouth Daily for 90 days. 7/13/23 10/11/23  Alyssa King MD        Social History:   Social History     Tobacco Use    Smoking status: Some Days     Packs/day: 0.25     Years: 20.00     Additional pack years: 0.00     Total pack years: 5.00     Types: Cigarettes    Smokeless tobacco: Never    Tobacco comments:     Down to 1 pack a week   Vaping Use    Vaping Use: Every day    Substances: Nicotine, NO VAPING 24 HR PRIOR TO SURGERY    Devices: Disposable   Substance Use  "Topics    Alcohol use: Yes     Comment: occasional    Drug use: Yes     Types: Marijuana         Review of Systems:  Review of Systems   Constitutional:  Negative for chills and fever.   HENT:  Negative for congestion, rhinorrhea and sore throat.    Eyes:  Negative for pain and visual disturbance.   Respiratory:  Positive for shortness of breath. Negative for apnea, cough and chest tightness.    Cardiovascular:  Positive for chest pain. Negative for palpitations.   Gastrointestinal:  Negative for abdominal pain, diarrhea, nausea and vomiting.   Genitourinary:  Negative for difficulty urinating and dysuria.   Musculoskeletal:  Negative for joint swelling and myalgias.   Skin:  Negative for color change.   Neurological:  Negative for seizures and headaches.   Psychiatric/Behavioral: Negative.     All other systems reviewed and are negative.       Physical Exam:  /78   Pulse 71   Temp 98 °F (36.7 °C) (Oral)   Resp 20   Ht 152.4 cm (60\")   Wt 70.9 kg (156 lb 4.9 oz)   LMP 08/31/2023 (Approximate)   SpO2 100%   BMI 30.53 kg/m²         Physical Exam  Vitals and nursing note reviewed.   Constitutional:       General: She is not in acute distress.     Appearance: Normal appearance. She is not toxic-appearing.   HENT:      Head: Normocephalic and atraumatic.      Jaw: There is normal jaw occlusion.      Mouth/Throat:      Mouth: Mucous membranes are moist.   Eyes:      General: Lids are normal.      Extraocular Movements: Extraocular movements intact.      Conjunctiva/sclera: Conjunctivae normal.      Pupils: Pupils are equal, round, and reactive to light.   Cardiovascular:      Rate and Rhythm: Normal rate and regular rhythm.      Pulses: Normal pulses.      Heart sounds: Normal heart sounds.   Pulmonary:      Effort: Pulmonary effort is normal. No respiratory distress.      Breath sounds: Normal breath sounds. No wheezing or rhonchi.   Abdominal:      General: Abdomen is flat. There is no distension.      " Palpations: Abdomen is soft.      Tenderness: There is no abdominal tenderness. There is no guarding or rebound.   Musculoskeletal:         General: Normal range of motion.      Cervical back: Normal range of motion and neck supple.      Right lower leg: No edema.      Left lower leg: No edema.   Skin:     General: Skin is warm and dry.   Neurological:      General: No focal deficit present.      Mental Status: She is alert and oriented to person, place, and time. Mental status is at baseline.   Psychiatric:         Mood and Affect: Mood normal.         Behavior: Behavior normal.                      Procedures:  Procedures      Medical Decision Making:      Comorbidities that affect care:    None    External Notes reviewed:    Previous Clinic Note: Patient was seen in clinic for hirsutism and hot flashes.      The following orders were placed and all results were independently analyzed by me:  Orders Placed This Encounter   Procedures    XR Chest 1 View    [place] Draw    High Sensitivity Troponin T    Comprehensive Metabolic Panel    Lipase    BNP    Magnesium    CBC Auto Differential    High Sensitivity Troponin T 2Hr    D-dimer, Quantitative    NPO Diet NPO Type: Strict NPO    Undress & Gown    Continuous Pulse Oximetry    Oxygen Therapy- Nasal Cannula; Titrate 1-6 LPM Per SpO2; 90 - 95%    ECG 12 Lead ED Triage Standing Order; Chest Pain    ECG 12 Lead ED Triage Standing Order; Chest Pain    Insert Peripheral IV    CBC & Differential    Green Top (Gel)    Lavender Top    Gold Top - SST    Light Blue Top       Medications Given in the Emergency Department:  Medications   sodium chloride 0.9 % flush 10 mL (has no administration in time range)   aspirin chewable tablet 324 mg (324 mg Oral Not Given 10/23/23 1918)        ED Course:    The patient was initially evaluated in the triage area where orders were placed. The patient was later dispositioned by Alma Schwarz MD.      The patient was advised to stay for  completion of workup which includes but is not limited to communication of labs and radiological results, reassessment and plan. The patient was advised that leaving prior to disposition by a provider could result in critical findings that are not communicated to the patient.     ED Course as of 10/23/23 2112   Mon Oct 23, 2023   1815 --- PROVIDER IN TRIAGE NOTE ---    The patient was evaluated by Aung valerio in triage. Orders were placed and the patient is currently awaiting disposition.    [AJ]      ED Course User Index  [AJ] Aung Valenzuela, SHAN       Labs:    Lab Results (last 24 hours)       Procedure Component Value Units Date/Time    High Sensitivity Troponin T [[id number]]  (Normal) Collected: 10/23/23 1746    Specimen: Blood Updated: 10/23/23 1819     HS Troponin T <6 ng/L     Narrative:      High Sensitive Troponin T Reference Range:  <10.0 ng/L- Negative Female for AMI  <15.0 ng/L- Negative Male for AMI  >=10 - Abnormal Female indicating possible myocardial injury.  >=15 - Abnormal Male indicating possible myocardial injury.   Clinicians would have to utilize clinical acumen, EKG, Troponin, and serial changes to determine if it is an Acute Myocardial Infarction or myocardial injury due to an underlying chronic condition.         CBC & Differential [[id number]]  (Abnormal) Collected: 10/23/23 1746    Specimen: Blood Updated: 10/23/23 1753    Narrative:      The following orders were created for panel order CBC & Differential.  Procedure                               Abnormality         Status                     ---------                               -----------         ------                     CBC Auto Differential[[id number]]        Abnormal            Final result                 Please view results for these tests on the individual orders.    Comprehensive Metabolic Panel [[id number]]  (Abnormal) Collected: 10/23/23 1746    Specimen: Blood Updated: 10/23/23 1814     Glucose 129 mg/dL       BUN 9 mg/dL      Creatinine 0.84 mg/dL      Sodium 137 mmol/L      Potassium 3.6 mmol/L      Chloride 101 mmol/L      CO2 25.3 mmol/L      Calcium 9.6 mg/dL      Total Protein 7.2 g/dL      Albumin 4.4 g/dL      ALT (SGPT) 17 U/L      AST (SGOT) 16 U/L      Alkaline Phosphatase 57 U/L      Total Bilirubin 0.7 mg/dL      Globulin 2.8 gm/dL      A/G Ratio 1.6 g/dL      BUN/Creatinine Ratio 10.7     Anion Gap 10.7 mmol/L      eGFR 91.9 mL/min/1.73     Narrative:      GFR Normal >60  Chronic Kidney Disease <60  Kidney Failure <15      Lipase [[id number]]  (Normal) Collected: 10/23/23 1746    Specimen: Blood Updated: 10/23/23 1814     Lipase 31 U/L     BNP [[id number]]  (Normal) Collected: 10/23/23 1746    Specimen: Blood Updated: 10/23/23 1819     proBNP <36.0 pg/mL     Narrative:      This assay is used as an aid in the diagnosis of individuals suspected of having heart failure. It can be used as an aid in the diagnosis of acute decompensated heart failure (ADHF) in patients presenting with signs and symptoms of ADHF to the emergency department (ED). In addition, NT-proBNP of <300 pg/mL indicates ADHF is not likely.    Age Range Result Interpretation  NT-proBNP Concentration (pg/mL:      <50             Positive            >450                   Gray                 300-450                    Negative             <300    50-75           Positive            >900                  Gray                300-900                  Negative            <300      >75             Positive            >1800                  Gray                300-1800                  Negative            <300    Magnesium [[id number]]  (Normal) Collected: 10/23/23 1746    Specimen: Blood Updated: 10/23/23 1814     Magnesium 2.1 mg/dL     CBC Auto Differential [[id number]]  (Abnormal) Collected: 10/23/23 1746    Specimen: Blood Updated: 10/23/23 1753     WBC 11.07 10*3/mm3      RBC 4.39 10*6/mm3      Hemoglobin 13.3 g/dL      Hematocrit 40.2 %      MCV  "91.6 fL      MCH 30.3 pg      MCHC 33.1 g/dL      RDW 12.3 %      RDW-SD 41.1 fl      MPV 10.8 fL      Platelets 242 10*3/mm3      Neutrophil % 48.5 %      Lymphocyte % 39.9 %      Monocyte % 7.3 %      Eosinophil % 3.2 %      Basophil % 0.7 %      Immature Grans % 0.4 %      Neutrophils, Absolute 5.37 10*3/mm3      Lymphocytes, Absolute 4.42 10*3/mm3      Monocytes, Absolute 0.81 10*3/mm3      Eosinophils, Absolute 0.35 10*3/mm3      Basophils, Absolute 0.08 10*3/mm3      Immature Grans, Absolute 0.04 10*3/mm3      nRBC 0.0 /100 WBC     D-dimer, Quantitative [[id number]]  (Normal) Collected: 10/23/23 1746    Specimen: Blood Updated: 10/23/23 1926     D-Dimer, Quantitative 0.31 MCGFEU/mL     Narrative:      According to the assay 's published package insert, a normal (<0.50 MCGFEU/mL) D-dimer result in conjunction with a non-high clinical probability assessment, excludes deep vein thrombosis (DVT) and pulmonary embolism (PE) with high sensitivity.    D-dimer values increase with age and this can make VTE exclusion of an older population difficult. To address this, the [employer], based on best available evidence and recent guidelines, recommends that clinicians use age-adjusted D-dimer thresholds in patients greater than 50 years of age with: a) a low probability of PE who do not meet all Pulmonary Embolism Rule Out Criteria, or b) in those with intermediate probability of PE.   The formula for an age-adjusted D-dimer cut-off is \"age/100\".  For example, a 60 year old patient would have an age-adjusted cut-off of 0.60 MCGFEU/mL and an 80 year old 0.80 MCGFEU/mL.    High Sensitivity Troponin T 2Hr [[id number]] Collected: 10/23/23 2002    Specimen: Blood Updated: 10/23/23 2026     HS Troponin T <6 ng/L      Troponin T Delta --     Comment: Unable to calculate.       Narrative:      High Sensitive Troponin T Reference Range:  <10.0 ng/L- Negative Female for AMI  <15.0 ng/L- Negative Male " for AMI  >=10 - Abnormal Female indicating possible myocardial injury.  >=15 - Abnormal Male indicating possible myocardial injury.   Clinicians would have to utilize clinical acumen, EKG, Troponin, and serial changes to determine if it is an Acute Myocardial Infarction or myocardial injury due to an underlying chronic condition.                  Imaging:    XR Chest 1 View    Result Date: 10/23/2023  PROCEDURE: XR CHEST 1 VW  COMPARISON: [facility], , XR CHEST 1 VW, 1/25/2022, 21:36.  INDICATIONS: CHEST PAIN TODAY  FINDINGS:  The lungs are clear. Cardiac, hilar, and mediastinal silhouettes are unremarkable. No pneumothorax or pleural effusion. Bony structures are intact.  The trachea is midline.           No active cardiopulmonary disease.       DOM CASTAÑEDA DO       Electronically Signed and Approved By: DOM CASTAÑEDA DO on 10/23/2023 at 19:20                Differential Diagnosis and Discussion:      Chest Pain:  Based on the patient's signs and symptoms, I considered aortic dissection, myocardial infaction, pulmonary embolism, cardiac tamponade, pericarditis, pneumothorax, musculoskeletal chest pain and other differential diagnosis as an etiology of the patient's chest pain.     All labs were reviewed and interpreted by me.  All X-rays impressions were independently interpreted by me.  EKG was interpreted by me.    MDM     Amount and/or Complexity of Data Reviewed  Clinical lab tests: reviewed  Tests in the radiology section of CPT®: reviewed  Tests in the medicine section of CPT®: reviewed    The patient had an EKG that shows no acute changes. Specifically, there are no ST elevations, t-wave changes of concern, delta waves, or rhythm abnormalities warranting admission. The patient was placed on the cardiac monitor and observed with continuous telemetry. The patient has a chest x-ray interpreted by me that is negative for pneumothorax, pneumonia, and is essentially unremarkable. The patient  has had unelevated troponins on blood draw.      The patient is resting comfortably and feels better, is alert and in no distress. The repeat examination is unremarkable and benign.  The patient´s CBC that was reviewed and interpreted by me shows no abnormalities of critical concern. Of note, there is no anemia requiring a blood transfusion and the platelet count is acceptable.  The patient´s CMP that was reviewed and interpretted by me shows no abnormalities of critical concern. Of note, the patient´s sodium and potassium are acceptable. The patient´s liver enzymes are unremarkable. The patient´s renal function (creatinine) is preserved. The patient has a normal anion gap.  Troponin is negative x2.  Electrocardiogram shows no signs of acute ischemia and the history, exam, diagnostic testing and current condition did not suggest that this patient is having an acute myocardial infarction, significant arrhythmia, unstable angina, esophageal perforation, pulmonary embolism, aortic dissection, severe pneumonia, sepsis for other significant pathology that would warrant further testing, continued ED treatment, admission, cardiology or other specialist consultation at this point. The vital signs have been stable. The patient's condition is stable and appropriate for discharge. The patient will pursue further outpatient evaluation with the primary care physician, or designated physician or cardiologist. The patient has expressed a clear and thorough understanding and agreed to follow-up as instructed.        Patient Care Considerations:    PERC: I used the PERC score to risk stratify the patient for PE and a CT of the chest was considered but ultimately not indicated in today's visit.      Consultants/Shared Management Plan:    None    Social Determinants of Health:    Patient is independent, reliable, and has access to care.       Disposition and Care Coordination:    Discharged: I considered escalation of care by  admitting this patient for observation, however the patient has improved and is suitable and  stable for discharge.    I have explained the patient´s condition, diagnoses and treatment plan based on the information available to me at this time. I have answered questions and addressed any concerns. The patient has a good  understanding of the patient´s diagnosis, condition, and treatment plan as can be expected at this point. The vital signs have been stable. The patient´s condition is stable and appropriate for discharge from the emergency department.      The patient will pursue further outpatient evaluation with the primary care physician or other designated or consulting physician as outlined in the discharge instructions. They are agreeable to this plan of care and follow-up instructions have been explained in detail. The patient has received these instructions in written format and have expressed an understanding of the discharge instructions. The patient is aware that any significant change in condition or worsening of symptoms should prompt an immediate return to this or the closest emergency department or call to 911.  I have explained discharge medications and the need for follow up with the patient/caretakers. This was also printed in the discharge instructions. Patient was discharged with the following medications and follow up:      Medication List      No changes were made to your prescriptions during this visit.      Alyssa King MD  [address] [address]  [phone]    In 2 days         Final diagnoses:   Chest pain, unspecified type        ED Disposition       ED Disposition   Discharge    Condition   Stable    Comment   --               This medical record created using voice recognition software.             Alma Schwarz MD  10/23/23 6962

## 2023-10-25 LAB
QT INTERVAL: 373 MS
QT INTERVAL: 406 MS
QTC INTERVAL: 400 MS
QTC INTERVAL: 422 MS

## 2024-01-16 RX ORDER — ERGOCALCIFEROL 1.25 MG/1
CAPSULE ORAL
Qty: 12 CAPSULE | Refills: 1 | Status: SHIPPED | OUTPATIENT
Start: 2024-01-16

## 2024-01-24 ENCOUNTER — OFFICE VISIT (OUTPATIENT)
Dept: INTERNAL MEDICINE | Facility: CLINIC | Age: 39
End: 2024-01-24
Payer: COMMERCIAL

## 2024-01-24 VITALS
OXYGEN SATURATION: 99 % | BODY MASS INDEX: 26.59 KG/M2 | WEIGHT: 159.6 LBS | DIASTOLIC BLOOD PRESSURE: 76 MMHG | TEMPERATURE: 98.1 F | HEART RATE: 78 BPM | SYSTOLIC BLOOD PRESSURE: 124 MMHG | HEIGHT: 65 IN

## 2024-01-24 DIAGNOSIS — F41.9 ANXIETY: ICD-10-CM

## 2024-01-24 DIAGNOSIS — F32.A DEPRESSION, UNSPECIFIED DEPRESSION TYPE: Primary | ICD-10-CM

## 2024-01-24 DIAGNOSIS — R45.851 PASSIVE SUICIDAL IDEATIONS: ICD-10-CM

## 2024-01-24 DIAGNOSIS — U09.9 POST COVID-19 CONDITION, UNSPECIFIED: ICD-10-CM

## 2024-01-24 DIAGNOSIS — R73.03 PREDIABETES: ICD-10-CM

## 2024-01-24 DIAGNOSIS — R06.00 DYSPNEA, UNSPECIFIED TYPE: ICD-10-CM

## 2024-01-24 DIAGNOSIS — R13.10 DYSPHAGIA, UNSPECIFIED TYPE: ICD-10-CM

## 2024-01-24 DIAGNOSIS — J35.1 LINGUAL TONSIL HYPERTROPHY: ICD-10-CM

## 2024-01-24 RX ORDER — ALBUTEROL SULFATE 90 UG/1
2 AEROSOL, METERED RESPIRATORY (INHALATION) EVERY 4 HOURS PRN
Qty: 18 G | Refills: 0 | Status: SHIPPED | OUTPATIENT
Start: 2024-01-24

## 2024-01-24 RX ORDER — BUSPIRONE HYDROCHLORIDE 5 MG/1
5 TABLET ORAL 2 TIMES DAILY
Qty: 60 TABLET | Refills: 1 | Status: SHIPPED | OUTPATIENT
Start: 2024-01-24

## 2024-01-24 RX ORDER — VENLAFAXINE HYDROCHLORIDE 75 MG/1
75 CAPSULE, EXTENDED RELEASE ORAL DAILY
Qty: 30 CAPSULE | Refills: 1 | Status: SHIPPED | OUTPATIENT
Start: 2024-01-24 | End: 2024-02-23

## 2024-01-24 RX ORDER — METFORMIN HYDROCHLORIDE 750 MG/1
750 TABLET, EXTENDED RELEASE ORAL
Qty: 90 TABLET | Refills: 1 | Status: SHIPPED | OUTPATIENT
Start: 2024-01-24

## 2024-01-24 RX ORDER — VENLAFAXINE HYDROCHLORIDE 37.5 MG/1
37.5 CAPSULE, EXTENDED RELEASE ORAL DAILY
Qty: 7 CAPSULE | Refills: 0 | Status: SHIPPED | OUTPATIENT
Start: 2024-01-24 | End: 2024-01-31

## 2024-01-24 NOTE — PROGRESS NOTES
"Chief Complaint  Prediabetes (Follow up), Sore Throat (Tonsil swelling up, makes it harder to breathe), and Shortness of Breath (Had few months ago and ever since it feels like gets short of breath and its harder to breathe )    Subjective            Andreina Barnes presents to [facility]  History of Present Illness    SOA:  Sore throat:  Pt states she's been having a lot of problems with her throat.   States she was seen in the ER in Oct/Nov for choking like sensations and throbbing pain in the front part of her throat. States she does not  have her tonsils in but was told that her \"3rd set of tonsils were swollen\"    States symptoms recurred last Friday. States it feels like there is something stuck in her throat. Feels like she can't clear it. States she is seeing ulcers in the back of her throat at times, described as white spots.     Re: SOA, states she feels like she is getting SOA quicker since she had covid in December. States she was seen for it in the ER and had cxr doen which was normal.     Depression:  Pt states for the past few months she's had no energy. \"Feels blah and sluggish and depressed\".   She feels like she just want to sleep.   States she has not been able to care for her house like she want ot.   States she's a worrier and tends to go worse case scenario for her health related symptoms.   States she was on something for anxiety but this was >8yrs ago.   States she's had passive thoughts, that \"this is infrequent\" \"but has thoughts that it would be ok if she was not here\".   States \"I feel sick, I don't feel as happy or tearful.\"    States she has a hard time falling and staying asleep. States she typically goes to sleep at migdnight and wakes up at 0600.   States she does snore and has woken up mid-snore at time.   States she does not feel rested when she wakes up from sleeping, even when she does get longer amount of sleep.   States she did " have a period of time when she stopped taking her medications due to feeling blah and depressed, then she started taking her meds a few weeks ago, except for the metformin, states she was not sure if she could just restart it.     Past Medical History:   Diagnosis Date    Anxiety     Dysthymia 9/5/2023    GERD (gastroesophageal reflux disease)     Hormone imbalance     Iron deficiency 9/5/2023    Mass of left breast     Migraine without aura, not refractory 9/5/2023       Allergies:   Allergies   Allergen Reactions    Amoxicillin Swelling and Rash    Morphine Hives    Penicillin G Swelling    Penicillin V Swelling    Nicoderm [Nicotine] Rash     Nicotine patches          Past Surgical History:   Procedure Laterality Date    APPENDECTOMY      BREAST LUMPECTOMY Left 9/20/2022    Procedure: Excision of left breast mass;  Surgeon: Savana Gaitan MD;  Location: [facility] OR [facility] [place];  Service: General;  Laterality: Left;    COLONOSCOPY N/A 7/20/2023    Procedure: COLONOSCOPY WITH BIOPSIES;  Surgeon: Jelly Borrero MD;  Location: [facility] ENDOSCOPY;  Service: Gastroenterology;  Laterality: N/A;  DIVERTICULOSIS    ENDOSCOPY N/A 7/20/2023    Procedure: ESOPHAGOGASTRODUODENOSCOPY WITH BIOPSIES;  Surgeon: Jelly Borrero MD;  Location: [facility] ENDOSCOPY;  Service: Gastroenterology;  Laterality: N/A;  HIATAL HERNIA, GASTRITIS, ESOPHAGEAL NODULE    TONSILLECTOMY            Social History     Socioeconomic History    Marital status:    Tobacco Use    Smoking status: Some Days     Packs/day: 0.25     Years: 20.00     Additional pack years: 0.00     Total pack years: 5.00     Types: Cigarettes    Smokeless tobacco: Never    Tobacco comments:     Down to 1 pack a week   Vaping Use    Vaping Use: Every day    Substances: Nicotine, NO VAPING 24 HR PRIOR TO SURGERY    Devices: Disposable   Substance and Sexual Activity    Alcohol use: Yes     Comment: occasional    Drug use: Yes     Types: Marijuana    Sexual  activity: Yes     Partners: Male     Birth control/protection: Vasectomy         Family History   Problem Relation Age of Onset    Liver cancer Mother 50        started in gallbladder, they removed that and it went to liver    Colon cancer Paternal Grandfather     Diabetes Maternal Grandmother     Diabetes Maternal Uncle     Malig Hyperthermia Neg Hx     Breast cancer Neg Hx     Ovarian cancer Neg Hx     Uterine cancer Neg Hx     Melanoma Neg Hx     Clotting disorder Neg Hx     Deep vein thrombosis Neg Hx     Pulmonary embolism Neg Hx           Health Maintenance Due   Topic Date Due    COVID-19 Vaccine (1) Never done    Pneumococcal Vaccine 0-64 (1 of 2 - PCV) Never done    ANNUAL PHYSICAL  Never done    PAP SMEAR  Never done    INFLUENZA VACCINE  Never done            Current Outpatient Medications:     albuterol sulfate  (90 Base) MCG/ACT inhaler, Inhale 2 puffs Every 4 (Four) Hours As Needed for Wheezing or Shortness of Air., Disp: 18 g, Rfl: 0    cholecalciferol (VITAMIN D3) 1.25 MG (03076 UT) capsule, TAKE 1 CAPSULE BY MOUTH ONCE A WEEK AS DIRECTED for 84, Disp: , Rfl:     fluticasone (FLONASE) 50 MCG/ACT nasal spray, 2 sprays into the nostril(s) as directed by provider Daily., Disp: 16 g, Rfl: 2    metFORMIN ER (GLUCOPHAGE-XR) 750 MG 24 hr tablet, Take 1 tablet by mouth Daily With Breakfast., Disp: 90 tablet, Rfl: 1    omeprazole (priLOSEC) 20 MG capsule, TAKE 1 CAPSULE BY MOUTH DAILY, Disp: 90 capsule, Rfl: 1    ondansetron ODT (ZOFRAN-ODT) 4 MG disintegrating tablet, Place 1 tablet on the tongue Every 8 (Eight) Hours As Needed for Nausea or Vomiting., Disp: 15 tablet, Rfl: 0    spironolactone (ALDACTONE) 50 MG tablet, Take 1 tablet by mouth Daily for 90 days., Disp: 90 tablet, Rfl: 1    vitamin B-12 (CYANOCOBALAMIN) 1000 MCG tablet, Take 1 tablet by mouth Daily., Disp: , Rfl:     vitamin D (ERGOCALCIFEROL) 1.25 MG (29107 UT) capsule capsule, TAKE 1 CAPSULE BY MOUTH 1 TIME EVERY WEEK FOR 12 DOSES,  "Disp: 12 capsule, Rfl: 1    benzonatate (TESSALON) 200 MG capsule, Take 1 capsule by mouth 3 (Three) Times a Day As Needed for Cough. (Patient not taking: Reported on 1/24/2024), Disp: 30 capsule, Rfl: 0    brompheniramine-pseudoephedrine-DM 30-2-10 MG/5ML syrup, Take 10 mL by mouth 3 (Three) Times a Day As Needed for Congestion or Cough. (Patient not taking: Reported on 1/24/2024), Disp: 120 mL, Rfl: 0    busPIRone (BUSPAR) 5 MG tablet, Take 1 tablet by mouth 2 (Two) Times a Day., Disp: 60 tablet, Rfl: 1    cetirizine (zyrTEC) 10 MG tablet, Take 1 tablet by mouth Daily for 30 days. (Patient not taking: Reported on 1/24/2024), Disp: 30 tablet, Rfl: 2    Progesterone (Prometrium) 200 MG capsule, Take one x 10 days.  Then take days 16-25 of cycle every month (Patient not taking: Reported on 1/24/2024), Disp: 30 capsule, Rfl: 4    venlafaxine XR (Effexor XR) 37.5 MG 24 hr capsule, Take 1 capsule by mouth Daily for 7 days., Disp: 7 capsule, Rfl: 0    venlafaxine XR (Effexor XR) 75 MG 24 hr capsule, Take 1 capsule by mouth Daily for 30 days., Disp: 30 capsule, Rfl: 1      Immunization History   Administered Date(s) Administered    Tdap 01/20/2022         Review of Systems       Objective       Vitals:    01/24/24 0918   BP: 124/76   BP Location: Left arm   Patient Position: Sitting   Cuff Size: Large Adult   Pulse: 78   Temp: 98.1 °F (36.7 °C)   TempSrc: Temporal   SpO2: 99%   Weight: 72.4 kg (159 lb 9.6 oz)   Height: 165.1 cm (65\")     Body mass index is 26.56 kg/m².      Physical Exam  Vitals reviewed.   Constitutional:       Appearance: Normal appearance.   HENT:      Head: Normocephalic and atraumatic.      Nose: Nose normal.      Mouth/Throat:      Lips: Pink.      Mouth: Mucous membranes are moist.      Tongue: Tongue does not deviate from midline.      Pharynx: No pharyngeal swelling, oropharyngeal exudate or posterior oropharyngeal erythema (clear drainage noted).      Tonsils: No tonsillar exudate.      " Comments: Pt is s/p tonsillectomy.     Lingual tonsils are very prominent with some crowding of posterior pharynx.   Eyes:      Extraocular Movements: Extraocular movements intact.      Conjunctiva/sclera: Conjunctivae normal.   Cardiovascular:      Rate and Rhythm: Normal rate and regular rhythm.      Pulses: Normal pulses.      Heart sounds: Normal heart sounds.   Pulmonary:      Effort: Pulmonary effort is normal. No respiratory distress.      Breath sounds: Normal breath sounds.   Musculoskeletal:         General: Normal range of motion.   Skin:     General: Skin is warm and dry.   Neurological:      General: No focal deficit present.      Mental Status: She is alert and oriented to person, place, and time.      Cranial Nerves: No cranial nerve deficit.   Psychiatric:         Mood and Affect: Mood normal.         Behavior: Behavior normal.         Thought Content: Thought content normal.             Result Review :                   CT soft tissue neck w/ contrast (9/2023)  IMPRESSION:                 1. Bilateral prominence of the lingual tonsils and adenoids.  Correlate with physical exam.       Amando Shukla M.D.         Electronically Signed and Approved By: Amando Shukla M.D. on 9/29/2023 at 16:17                Assessment and Plan      Diagnoses and all orders for this visit:    1. Depression, unspecified depression type (Primary)  Comments:  Chronic, intemrittent, active w/ passive SI w/o plan. Starting Effexor. Discussed most common AE, including risk for possible worsening SI. Close f/u in 6-8wks.  Orders:  -     venlafaxine XR (Effexor XR) 37.5 MG 24 hr capsule; Take 1 capsule by mouth Daily for 7 days.  Dispense: 7 capsule; Refill: 0  -     venlafaxine XR (Effexor XR) 75 MG 24 hr capsule; Take 1 capsule by mouth Daily for 30 days.  Dispense: 30 capsule; Refill: 1    2. Anxiety  Comments:  Chronic, active in light of current health concerns. Starting effexor per above. Buspar also started  today.  Orders:  -     busPIRone (BUSPAR) 5 MG tablet; Take 1 tablet by mouth 2 (Two) Times a Day.  Dispense: 60 tablet; Refill: 1    3. Passive suicidal ideations  Comments:  Acute, without plan. See plan above.    4. Dysphagia, unspecified type  Comments:  Chronic and intermittent. Currently active. Initial w/u w/ tx for GERD and EGD for further eval was grossly unremarkble except for esophageal nodule (squamous papilloma). Swallow study was ordered in April 2023 but deffered since EGD was unremarkable and symptoms were resolved. Referring to ENT and swallow study re-ordered given recurrent symptoms.   Orders:  -     FL Video Swallow With Speech Single Contrast  -     Cancel: Ambulatory Referral to ENT (Otolaryngology)  -     Ambulatory Referral to ENT (Otolaryngology)    5. Lingual tonsil hypertrophy  Comments:  New, presume acute w/ some crowding in the posterior pharynx. No active signs of infection. Referring to ENT per above given report of choking sensation and difficulty swallowing.   Orders:  -     Cancel: Ambulatory Referral to ENT (Otolaryngology)  -     Ambulatory Referral to ENT (Otolaryngology)    6. Dyspnea, unspecified type  Comments:  Subacute and persisting. Pt w/ prominent lingual tonsils on exam today with crowding noted in posterior pharynx, referring to ENT for further eval. PFT also ordered for further eval. Continue albuterol inhaler, prescribed with recent covid infection.   Orders:  -     albuterol sulfate  (90 Base) MCG/ACT inhaler; Inhale 2 puffs Every 4 (Four) Hours As Needed for Wheezing or Shortness of Air.  Dispense: 18 g; Refill: 0  -     Complete PFT - Pre & Post Bronchodilator  -     Cancel: Ambulatory Referral to ENT (Otolaryngology)  -     Ambulatory Referral to ENT (Otolaryngology)    7. 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 Given dyspnea and dysphagia additional w/u  per above.    8. Prediabetes  Comments:  Dx in April 2023. On metformin which she was tolerating well before stopping this agent on her own in August. Pt advised to resume metformin. Refilled meds.  Orders:  -     metFORMIN ER (GLUCOPHAGE-XR) 750 MG 24 hr tablet; Take 1 tablet by mouth Daily With Breakfast.  Dispense: 90 tablet; Refill: 1      I spent at least 45 minutes caring for Andreina on this date of service. This time includes time spent by me in the following activities:preparing for the visit, reviewing tests, performing a medically appropriate examination and/or evaluation , counseling and educating the patient/family/caregiver, ordering medications, tests, or procedures, referring and communicating with other health care professionals , and documenting information in the medical record    Follow Up     Return in about 6 weeks (around 3/6/2024) for Dysphagia, prediabetes, depression .    Patient was given instructions and counseling regarding her condition or for health maintenance advice. Please see specific information pulled into the AVS if appropriate.     Alyssa King MD   Internal Medicine-Pediatrics

## 2024-01-30 ENCOUNTER — TELEPHONE (OUTPATIENT)
Dept: GASTROENTEROLOGY | Facility: CLINIC | Age: 39
End: 2024-01-30
Payer: COMMERCIAL

## 2024-01-30 NOTE — TELEPHONE ENCOUNTER
Left voice message for patient to return call if she would like a sooner appointment. [facility] has an opening on 1/31@3 or 2/6/24. If patient returns call and the appointments isn't available, she can stay on the cancellation list.

## 2024-02-01 ENCOUNTER — CLINICAL SUPPORT (OUTPATIENT)
Dept: INTERNAL MEDICINE | Facility: CLINIC | Age: 39
End: 2024-02-01
Payer: COMMERCIAL

## 2024-02-01 DIAGNOSIS — R73.03 PREDIABETES: ICD-10-CM

## 2024-02-01 LAB
ALBUMIN SERPL-MCNC: 4.5 G/DL (ref 3.5–5.2)
ALBUMIN/GLOB SERPL: 1.8 G/DL
ALP SERPL-CCNC: 51 U/L (ref 39–117)
ALT SERPL W P-5'-P-CCNC: 12 U/L (ref 1–33)
ANION GAP SERPL CALCULATED.3IONS-SCNC: 11 MMOL/L (ref 5–15)
AST SERPL-CCNC: 12 U/L (ref 1–32)
BASOPHILS # BLD AUTO: 0.07 10*3/MM3 (ref 0–0.2)
BASOPHILS NFR BLD AUTO: 0.7 % (ref 0–1.5)
BILIRUB SERPL-MCNC: 1.3 MG/DL (ref 0–1.2)
BUN SERPL-MCNC: 9 MG/DL (ref 6–20)
BUN/CREAT SERPL: 10.3 (ref 7–25)
CALCIUM SPEC-SCNC: 9.8 MG/DL (ref 8.6–10.5)
CHLORIDE SERPL-SCNC: 99 MMOL/L (ref 98–107)
CHOLEST SERPL-MCNC: 156 MG/DL (ref 0–200)
CO2 SERPL-SCNC: 26 MMOL/L (ref 22–29)
CREAT SERPL-MCNC: 0.87 MG/DL (ref 0.57–1)
DEPRECATED RDW RBC AUTO: 41.4 FL (ref 37–54)
EGFRCR SERPLBLD CKD-EPI 2021: 88.1 ML/MIN/1.73
EOSINOPHIL # BLD AUTO: 0.41 10*3/MM3 (ref 0–0.4)
EOSINOPHIL NFR BLD AUTO: 4.3 % (ref 0.3–6.2)
ERYTHROCYTE [DISTWIDTH] IN BLOOD BY AUTOMATED COUNT: 12.2 % (ref 12.3–15.4)
GLOBULIN UR ELPH-MCNC: 2.5 GM/DL
GLUCOSE SERPL-MCNC: 122 MG/DL (ref 65–99)
HBA1C MFR BLD: 5.9 % (ref 4.8–5.6)
HCT VFR BLD AUTO: 40.7 % (ref 34–46.6)
HDLC SERPL-MCNC: 30 MG/DL (ref 40–60)
HGB BLD-MCNC: 13.9 G/DL (ref 12–15.9)
IMM GRANULOCYTES # BLD AUTO: 0.02 10*3/MM3 (ref 0–0.05)
IMM GRANULOCYTES NFR BLD AUTO: 0.2 % (ref 0–0.5)
LDLC SERPL CALC-MCNC: 100 MG/DL (ref 0–100)
LDLC/HDLC SERPL: 3.23 {RATIO}
LYMPHOCYTES # BLD AUTO: 3.93 10*3/MM3 (ref 0.7–3.1)
LYMPHOCYTES NFR BLD AUTO: 41.1 % (ref 19.6–45.3)
MCH RBC QN AUTO: 31.4 PG (ref 26.6–33)
MCHC RBC AUTO-ENTMCNC: 34.2 G/DL (ref 31.5–35.7)
MCV RBC AUTO: 92.1 FL (ref 79–97)
MONOCYTES # BLD AUTO: 0.87 10*3/MM3 (ref 0.1–0.9)
MONOCYTES NFR BLD AUTO: 9.1 % (ref 5–12)
NEUTROPHILS NFR BLD AUTO: 4.26 10*3/MM3 (ref 1.7–7)
NEUTROPHILS NFR BLD AUTO: 44.6 % (ref 42.7–76)
NRBC BLD AUTO-RTO: 0 /100 WBC (ref 0–0.2)
PLATELET # BLD AUTO: 259 10*3/MM3 (ref 140–450)
PMV BLD AUTO: 11.3 FL (ref 6–12)
POTASSIUM SERPL-SCNC: 4.3 MMOL/L (ref 3.5–5.2)
PROT SERPL-MCNC: 7 G/DL (ref 6–8.5)
RBC # BLD AUTO: 4.42 10*6/MM3 (ref 3.77–5.28)
SODIUM SERPL-SCNC: 136 MMOL/L (ref 136–145)
T4 FREE SERPL-MCNC: 1.37 NG/DL (ref 0.93–1.7)
TRIGL SERPL-MCNC: 146 MG/DL (ref 0–150)
TSH SERPL DL<=0.05 MIU/L-ACNC: 0.79 UIU/ML (ref 0.27–4.2)
VLDLC SERPL-MCNC: 26 MG/DL (ref 5–40)
WBC NRBC COR # BLD AUTO: 9.56 10*3/MM3 (ref 3.4–10.8)

## 2024-02-01 PROCEDURE — 84439 ASSAY OF FREE THYROXINE: CPT | Performed by: STUDENT IN AN ORGANIZED HEALTH CARE EDUCATION/TRAINING PROGRAM

## 2024-02-01 PROCEDURE — 83036 HEMOGLOBIN GLYCOSYLATED A1C: CPT | Performed by: STUDENT IN AN ORGANIZED HEALTH CARE EDUCATION/TRAINING PROGRAM

## 2024-02-01 PROCEDURE — 80061 LIPID PANEL: CPT | Performed by: STUDENT IN AN ORGANIZED HEALTH CARE EDUCATION/TRAINING PROGRAM

## 2024-02-01 PROCEDURE — 80050 GENERAL HEALTH PANEL: CPT | Performed by: STUDENT IN AN ORGANIZED HEALTH CARE EDUCATION/TRAINING PROGRAM

## 2024-02-05 ENCOUNTER — TELEPHONE (OUTPATIENT)
Dept: INTERNAL MEDICINE | Facility: CLINIC | Age: 39
End: 2024-02-05
Payer: COMMERCIAL

## 2024-02-05 NOTE — TELEPHONE ENCOUNTER
Caller: Andreina Barnes    Relationship: Self    Best call back number: [phone]     Which medication are you concerned about: METFORMIN    Who prescribed you this medication: REM[facility]    When did you start taking this medication: 1/24/24    What are your concerns: THE MEDICATION CAUSES HER TO FEEL NAUSEOUS LIKE SHE IS GOING TO VOMIT    How long have you had these concerns: A WEEK

## 2024-02-06 RX ORDER — METFORMIN HYDROCHLORIDE 500 MG/1
500 TABLET, EXTENDED RELEASE ORAL
Qty: 30 TABLET | Refills: 1 | Status: SHIPPED | OUTPATIENT
Start: 2024-02-06

## 2024-02-06 NOTE — TELEPHONE ENCOUNTER
Caller: Andreina Barnes    Relationship to patient: Self    Best call back number: [phone]     Patient is needing: REQUESTING AN UPDATE ON THIS MEDICATION.

## 2024-02-07 RX ORDER — METFORMIN HYDROCHLORIDE 500 MG/1
500 TABLET, EXTENDED RELEASE ORAL
Qty: 90 TABLET | OUTPATIENT
Start: 2024-02-07

## 2024-02-07 NOTE — TELEPHONE ENCOUNTER
Spoke with patient rely message, patient states she was taking it after breakfast, patient states she will  medication today.

## 2024-02-23 ENCOUNTER — TELEPHONE (OUTPATIENT)
Dept: INTERNAL MEDICINE | Facility: CLINIC | Age: 39
End: 2024-02-23

## 2024-02-23 NOTE — TELEPHONE ENCOUNTER
Called and spoke with pt, informed pt provider is out of the office until 3/5, pt stated when she saw the ENT they diagnosed her with silent reflux, ENT stated there isn't much treatment for that other than dietary changes and some stronger medication, Pt stated ENT stated an OTC medication she could take but pt was unable to remember the name of it, pt left a message with the ENT for the office to call her back with the name of the medication but in the mean time pt stated she was going to go to the pharmacy and speak with a pharmacist on what they recommended over the counter. Pt has follow up scheduled with provider on 3/20, I informed pt as soon as I spoke with provider I would reach back out to pt.

## 2024-02-23 NOTE — TELEPHONE ENCOUNTER
Caller: Andreina Barnes    Relationship: Self    Best call back number: [phone]     What medication are you requesting: N/A    What are your current symptoms: HEARTBURN, ACID REFLUX    How long have you been experiencing symptoms: MONTHS    Have you had these symptoms before:    [x] Yes  [] No    Have you been treated for these symptoms before:   [x] Yes  [] No    If a prescription is needed, what is your preferred pharmacy and phone number: [id number] - [place], [place] - [address] [facility] AT [facility] OF [address] & [place] - [phone] [facility] [phone] FX     PATIENT IS ON OMEPRAZOLE BUT IT ISNT EFFECTIVE AND WOULD LIKE SOMETHING STRONGER

## 2024-02-26 NOTE — TELEPHONE ENCOUNTER
Can we send in a script for this pt? When I called pt she didn't have a preference just stated whichever one we thought would work best.

## 2024-02-26 NOTE — TELEPHONE ENCOUNTER
It looks she is on omeprazole.  Is that new or has she been taking it?  If she has been on it increase to 40mg daily.  If not then give it a month or so to see if it works.

## 2024-02-26 NOTE — TELEPHONE ENCOUNTER
Call patient and let her know she can try pepcid, or famotidine; nexium and prilosec are a bit stronger and sometimes slightly interact with effexor, but overall any of them would be good options.  WE can prescribe any if she wishes and then she can discuss further with Dr. Sahh if she wishes.

## 2024-02-27 NOTE — TELEPHONE ENCOUNTER
Spoke with patient rely message, she stated she has been taking omeprazole for a while now, she will try taking 40MG daily and will call us back if it does not help.

## 2024-03-01 ENCOUNTER — HOSPITAL ENCOUNTER (OUTPATIENT)
Dept: ULTRASOUND IMAGING | Facility: HOSPITAL | Age: 39
Discharge: HOME | End: 2024-03-01
Payer: COMMERCIAL

## 2024-03-01 ENCOUNTER — HOSPITAL ENCOUNTER (OUTPATIENT)
Dept: MAMMOGRAPHY | Facility: HOSPITAL | Age: 39
Discharge: HOME | End: 2024-03-01
Payer: COMMERCIAL

## 2024-03-01 DIAGNOSIS — R92.8 ABNORMAL MAMMOGRAM OF LEFT BREAST: ICD-10-CM

## 2024-03-01 PROCEDURE — 76642 ULTRASOUND BREAST LIMITED: CPT

## 2024-03-01 PROCEDURE — G0279 TOMOSYNTHESIS, MAMMO: HCPCS

## 2024-03-01 PROCEDURE — 77065 DX MAMMO INCL CAD UNI: CPT

## 2024-03-08 ENCOUNTER — HOSPITAL ENCOUNTER (OUTPATIENT)
Dept: RESPIRATORY THERAPY | Facility: HOSPITAL | Age: 39
Discharge: HOME | End: 2024-03-08
Payer: COMMERCIAL

## 2024-03-08 ENCOUNTER — HOSPITAL ENCOUNTER (OUTPATIENT)
Dept: GENERAL RADIOLOGY | Facility: HOSPITAL | Age: 39
Discharge: HOME | End: 2024-03-08
Payer: COMMERCIAL

## 2024-03-08 DIAGNOSIS — R13.10 DYSPHAGIA, UNSPECIFIED TYPE: Primary | ICD-10-CM

## 2024-03-08 PROCEDURE — 94726 PLETHYSMOGRAPHY LUNG VOLUMES: CPT

## 2024-03-08 PROCEDURE — 94060 EVALUATION OF WHEEZING: CPT

## 2024-03-08 PROCEDURE — 94729 DIFFUSING CAPACITY: CPT

## 2024-03-08 PROCEDURE — 92611 MOTION FLUOROSCOPY/SWALLOW: CPT

## 2024-03-08 PROCEDURE — 74230 X-RAY XM SWLNG FUNCJ C+: CPT

## 2024-03-08 RX ORDER — ALBUTEROL SULFATE 2.5 MG/3ML
2.5 SOLUTION RESPIRATORY (INHALATION) ONCE
Status: COMPLETED | OUTPATIENT
Start: 2024-03-08 | End: 2024-03-08

## 2024-03-08 RX ADMIN — ALBUTEROL SULFATE 2.5 MG: 2.5 SOLUTION RESPIRATORY (INHALATION) at 11:05

## 2024-03-08 RX ADMIN — BARIUM SULFATE 55 ML: 0.81 POWDER, FOR SUSPENSION ORAL at 10:20

## 2024-03-08 RX ADMIN — BARIUM SULFATE 1 TEASPOON(S): 0.6 CREAM ORAL at 10:20

## 2024-03-08 RX ADMIN — BARIUM SULFATE 50 ML: 400 SUSPENSION ORAL at 10:20

## 2024-03-08 NOTE — MBS/VFSS/FEES
outpatient  - Speech Language Pathology   Swallow  modified barium swallow study   Manuel     Patient Name: Andreina Barnes  : 1985  MRN: [id number]  Today's Date: 3/8/2024               Admit Date: 3/8/2024    Visit Dx:     ICD-10-CM ICD-9-CM   1. 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    Hyperlipidemia    Hypothyroidism    Impaired fasting glucose    Iron deficiency    Migraine without aura, not refractory    Vitamin D deficiency    Incontinence of feces    Urinary incontinence     Past Medical History:   Diagnosis Date    Anxiety     Dysthymia 2023    GERD (gastroesophageal reflux disease)     Hormone imbalance     Iron deficiency 2023    Mass of left breast     Migraine without aura, not refractory 2023     Past Surgical History:   Procedure Laterality Date    APPENDECTOMY      BREAST LUMPECTOMY Left 2022    Procedure: Excision of left breast mass;  Surgeon: Savana Gaitan MD;  [place] [facility] [place] [facility] [place]  Service: General;  Laterality: Left;    COLONOSCOPY N/A 2023    Procedure: COLONOSCOPY WITH BIOPSIES;  Surgeon: Jelly Borrero MD;  Location: [facility] ENDOSCOPY;  Service: Gastroenterology;  Laterality: N/A;  DIVERTICULOSIS    ENDOSCOPY N/A 2023    Procedure: ESOPHAGOGASTRODUODENOSCOPY WITH BIOPSIES;  Surgeon: Jelly Borrero MD;  Location: [facility] ENDOSCOPY;  Service: Gastroenterology;  Laterality: N/A;  HIATAL HERNIA, GASTRITIS, ESOPHAGEAL NODULE    TONSILLECTOMY         SLP Recommendation and Plan      MODIFIED BARIUM SWALLOW STUDY: SPEECH PATHOLOGY REPORT        DATE OF SERVICE:  3/8/2024    PERTINENT INFORMATION:  Ms. Barnes is a 38year old female with complaint of dysphagia.    She was referred for an MBSS by Dr. Shah to rule out aspiration as well as to determine appropriate treatment plan for this patient.      PROCEDURE:    Ms. Barnes was alert and cooperative.  The patient was viewed in lateral plane.  The following Ba consistencies were administered: Thin barium, nectar thick barium, barium mixed applesauce, barium with cracker.  The following compensatory swallowing strategies were performed: As modification, cyclic ingestion.      RESULTS:    1.  Nectar thick liquid by cup.  Swallow completed.  2.  Thin liquid by cup.  Swallow completed  3.  Purée by spoon.  Swallow completed  4.  Solid consistency.  Chewing with swallow completed.  5.  Nectar thick liquid by straw.  Swallow completed.  6.  Thin liquid by straw.  Swallow completed.      IMPRESSIONS:    Ms. Barnes demonstrated oropharyngeal swallow appearing grossly within functional limits.  No aspiration or penetration observed during the study.      FUNCTIONAL DEFICIT: Patient scored level 7 of 7 on Functional Communication Measures for swallowing indicating a 0% limitation in function for current status, goal status, and discharge status.      RECOMMENDATIONS:   1.  Diet: Regular solids, thin liquids  2.  Position: Fully upright for all p.o. intake, 30 minutes following        Yes, Patient/responsible party agrees with the plan of care and has been informed of all alternatives, risks and benefits.    Thank you for this referral.                                                                                    EDUCATION  The patient has been educated in the following areas:   Dysphagia (Swallowing Impairment).              Time Calculation:       Therapy Charges for Today       Code Description Service Date Service Provider Modifiers Qty    [id number] HC ST MOTION FLUORO EVAL SWALLOW 6 3/8/2024 Yoselin Wills MS-BRANDEN/SLP, CNT  GN 1                 Yoselin Wills, MS-CCC/SLP, CNT  3/8/2024

## 2024-03-20 ENCOUNTER — OFFICE VISIT (OUTPATIENT)
Dept: INTERNAL MEDICINE | Facility: CLINIC | Age: 39
End: 2024-03-20
Payer: COMMERCIAL

## 2024-03-20 VITALS
BODY MASS INDEX: 25.23 KG/M2 | TEMPERATURE: 96.7 F | DIASTOLIC BLOOD PRESSURE: 78 MMHG | HEART RATE: 68 BPM | SYSTOLIC BLOOD PRESSURE: 120 MMHG | WEIGHT: 151.6 LBS | OXYGEN SATURATION: 98 %

## 2024-03-20 DIAGNOSIS — R13.10 DYSPHAGIA, UNSPECIFIED TYPE: ICD-10-CM

## 2024-03-20 DIAGNOSIS — R73.03 PREDIABETES: ICD-10-CM

## 2024-03-20 DIAGNOSIS — R63.4 WEIGHT LOSS: ICD-10-CM

## 2024-03-20 DIAGNOSIS — K21.9 GASTROESOPHAGEAL REFLUX DISEASE, UNSPECIFIED WHETHER ESOPHAGITIS PRESENT: ICD-10-CM

## 2024-03-20 DIAGNOSIS — F32.A DEPRESSION, UNSPECIFIED DEPRESSION TYPE: Primary | ICD-10-CM

## 2024-03-20 DIAGNOSIS — Z87.09 HISTORY OF THROAT PROBLEM: ICD-10-CM

## 2024-03-20 PROCEDURE — 99214 OFFICE O/P EST MOD 30 MIN: CPT | Performed by: STUDENT IN AN ORGANIZED HEALTH CARE EDUCATION/TRAINING PROGRAM

## 2024-03-20 RX ORDER — VENLAFAXINE HYDROCHLORIDE 75 MG/1
75 CAPSULE, EXTENDED RELEASE ORAL DAILY
Qty: 90 CAPSULE | Refills: 1 | Status: SHIPPED | OUTPATIENT
Start: 2024-03-20

## 2024-03-20 RX ORDER — PANTOPRAZOLE SODIUM 40 MG/1
40 TABLET, DELAYED RELEASE ORAL DAILY
Qty: 30 TABLET | Refills: 1 | Status: SHIPPED | OUTPATIENT
Start: 2024-03-20

## 2024-03-20 RX ORDER — PANTOPRAZOLE SODIUM 40 MG/1
40 TABLET, DELAYED RELEASE ORAL DAILY
Qty: 90 TABLET | OUTPATIENT
Start: 2024-03-20

## 2024-03-20 NOTE — PROGRESS NOTES
Chief Complaint  Difficulty Swallowing (Follow up), Prediabetes, and Depression    Subjective            Andreina Barnes presents to [facility]  History of Present Illness  History of Present Illness  The patient presents for evaluation of multiple medical concerns.    She has been doing better since her last visit. The depression medicine has helped tremendously. She is not constant worried about everything. She is not as jumpy as before. She denies any side effects from the medication. She is on 75 mg.    She was diagnosed with silent reflux disease by Dr. Alvarenga, ENT. She has lost weight because she feels like everything triggers it. No matter what she eats or drinks, it is always bothering her. She was told to try some over-the-counter medications, but she had brain fog that week and could not remember anything. She ended up calling him back, but he was in [place], so she could not reach him. She called here and was told to double up on her omeprazole 20 mg, but she does not think it is helping at all. She has been taking 2 tablets since she called here. Dr. Alvarenga told her that she could extend her tongue further than normal, but that was her third tonsil. They did not recommend removing it. She had to change her lifestyle and take medicine for it. She had a pulmonary function test. She has never been on Nexium or Protonix.    She is doing better on the metformin. It was making her sick. She switched it to after dinner and it has been working way better.    She feels like her gland is getting bigger. She occasionally gets blisters in the back of her throat. They do not bother her. They come and go. Sometimes they look like they have a little yellow on them. She notices them every 2 weeks.        Past Medical History:   Diagnosis Date    Anxiety     Dysthymia 9/5/2023    GERD (gastroesophageal reflux disease)     Hormone imbalance     Iron deficiency 9/5/2023     Mass of left breast     Migraine without aura, not refractory 9/5/2023       Allergies:   Allergies   Allergen Reactions    Amoxicillin Swelling and Rash    Morphine Hives    Penicillin G Swelling    Penicillin V Swelling    Nicoderm [Nicotine] Rash     Nicotine patches          Past Surgical History:   Procedure Laterality Date    APPENDECTOMY      BREAST LUMPECTOMY Left 9/20/2022    Procedure: Excision of left breast mass;  Surgeon: Savana Gaitan MD;  Location: [facility] [place] [facility] [place];  Service: General;  Laterality: Left;    COLONOSCOPY N/A 7/20/2023    Procedure: COLONOSCOPY WITH BIOPSIES;  Surgeon: Jelly Borrero MD;  Location: [facility] ENDOSCOPY;  Service: Gastroenterology;  Laterality: N/A;  DIVERTICULOSIS    ENDOSCOPY N/A 7/20/2023    Procedure: ESOPHAGOGASTRODUODENOSCOPY WITH BIOPSIES;  Surgeon: Jelly Borrero MD;  Location: [facility] ENDOSCOPY;  Service: Gastroenterology;  Laterality: N/A;  HIATAL HERNIA, GASTRITIS, ESOPHAGEAL NODULE    TONSILLECTOMY            Social History     Socioeconomic History    Marital status:    Tobacco Use    Smoking status: Some Days     Current packs/day: 0.25     Average packs/day: 0.3 packs/day for 20.0 years (5.0 ttl pk-yrs)     Types: Cigarettes    Smokeless tobacco: Never    Tobacco comments:     Down to 1 pack a week   Vaping Use    Vaping status: Every Day    Substances: Nicotine, NO VAPING 24 HR PRIOR TO SURGERY    Devices: Disposable   Substance and Sexual Activity    Alcohol use: Yes     Comment: occasional    Drug use: Yes     Types: Marijuana    Sexual activity: Yes     Partners: Male     Birth control/protection: Vasectomy         Family History   Problem Relation Age of Onset    Liver cancer Mother 50        started in gallbladder, they removed that and it went to liver    Colon cancer Paternal Grandfather     Diabetes Maternal Grandmother     Diabetes Maternal Uncle     Malig Hyperthermia Neg Hx     Breast cancer Neg Hx     Ovarian cancer Neg  Hx     Uterine cancer Neg Hx     Melanoma Neg Hx     Clotting disorder Neg Hx     Deep vein thrombosis Neg Hx     Pulmonary embolism Neg Hx           Health Maintenance Due   Topic Date Due    Pneumococcal Vaccine 0-64 (1 of 2 - PCV) Never done    ANNUAL PHYSICAL  Never done    PAP SMEAR  Never done    INFLUENZA VACCINE  Never done    COVID-19 Vaccine (1 - 2023-24 season) Never done            Current Outpatient Medications:     albuterol sulfate  (90 Base) MCG/ACT inhaler, Inhale 2 puffs Every 4 (Four) Hours As Needed for Wheezing or Shortness of Air., Disp: 18 g, Rfl: 0    busPIRone (BUSPAR) 5 MG tablet, Take 1 tablet by mouth 2 (Two) Times a Day., Disp: 60 tablet, Rfl: 1    cholecalciferol (VITAMIN D3) 1.25 MG (01422 UT) capsule, TAKE 1 CAPSULE BY MOUTH ONCE A WEEK AS DIRECTED for 84, Disp: , Rfl:     fluticasone (FLONASE) 50 MCG/ACT nasal spray, 2 sprays into the nostril(s) as directed by provider Daily., Disp: 16 g, Rfl: 2    metFORMIN ER (GLUCOPHAGE-XR) 500 MG 24 hr tablet, Take 1 tablet by mouth Daily With Breakfast., Disp: 30 tablet, Rfl: 1    venlafaxine XR (Effexor XR) 75 MG 24 hr capsule, Take 1 capsule by mouth Daily., Disp: 90 capsule, Rfl: 1    vitamin B-12 (CYANOCOBALAMIN) 1000 MCG tablet, Take 1 tablet by mouth Daily., Disp: , Rfl:     vitamin D (ERGOCALCIFEROL) 1.25 MG (09580 UT) capsule capsule, TAKE 1 CAPSULE BY MOUTH 1 TIME EVERY WEEK FOR 12 DOSES, Disp: 12 capsule, Rfl: 1    pantoprazole (Protonix) 40 MG EC tablet, Take 1 tablet by mouth Daily., Disp: 30 tablet, Rfl: 1    spironolactone (ALDACTONE) 50 MG tablet, Take 1 tablet by mouth Daily for 90 days., Disp: 90 tablet, Rfl: 1      Immunization History   Administered Date(s) Administered    Tdap 01/20/2022         Review of Systems       Objective       Vitals:    03/20/24 0841   BP: 120/78   BP Location: Right arm   Patient Position: Sitting   Cuff Size: Adult   Pulse: 68   Temp: 96.7 °F (35.9 °C)   TempSrc: Temporal   SpO2: 98%    Weight: 68.8 kg (151 lb 9.6 oz)     Body mass index is 25.23 kg/m².      Physical Exam  Vitals reviewed.   Constitutional:       Appearance: Normal appearance.   HENT:      Head: Normocephalic and atraumatic.      Nose: Nose normal.   Eyes:      Extraocular Movements: Extraocular movements intact.      Conjunctiva/sclera: Conjunctivae normal.   Neck:      Thyroid: No thyroid mass, thyromegaly or thyroid tenderness.   Cardiovascular:      Rate and Rhythm: Normal rate and regular rhythm.      Pulses: Normal pulses.      Heart sounds: Normal heart sounds.   Pulmonary:      Effort: Pulmonary effort is normal. No respiratory distress.      Breath sounds: Normal breath sounds.   Abdominal:      General: Bowel sounds are normal.      Palpations: Abdomen is soft.      Tenderness: There is no abdominal tenderness. There is no guarding or rebound.   Musculoskeletal:         General: Normal range of motion.   Skin:     General: Skin is warm and dry.   Neurological:      General: No focal deficit present.      Mental Status: She is alert and oriented to person, place, and time.      Cranial Nerves: No cranial nerve deficit.   Psychiatric:         Mood and Affect: Mood normal.         Behavior: Behavior normal.         Thought Content: Thought content normal.       Physical Exam             Result Review :   Results  Laboratory Studies  Labs were reviewed with the patient.    Imaging  Swallow study was reviewed with the patient.                             Assessment and Plan        Assessment & Plan  Depression, unspecified depression type  Chronic, improved since starting effexor. Doing well on 75mg daily which will be continued.   Prediabetes  Recent diangosis, pt is actively working on lifestyle changes. She is tolerating metformin at 500mg daily. Fr/u in 3 mos w/ repat labs.   Dysphagia, unspecified type  Pt w/ chronic symptoms of difficulty swallowing for which she was referred to ENT and to speech for swallow eval.  "Requesting ENT notes. Reviewed swallow study which was unremarkable except for \"prominent cricopharyngeus muscle appreciated on exam without functional obstruction\". Concern that her dysphagia may be related to silent reflux, see plan below.   History of throat problem  Pt reporting intermittent blister like lesion over the posterior pharynx  at times. Pt was advised to send picture when lesions are present .   Weight loss  5lb wt loss noted since last visit. Intentional with lifestyle changes.   Gastroesophageal reflux disease, unspecified whether esophagitis present  Chronic, no improvement with omeprazole at 40mg. Will trial pt on pantoprazole. She is to reach out via [employer] w/ updates on her symptoms with plan to add pepcid if symptoms persist.     Orders Placed This Encounter   Procedures    Comprehensive Metabolic Panel    Hemoglobin A1c    Lipid Panel    TSH    T4, Free    CBC & Differential     New Medications Ordered This Visit   Medications    venlafaxine XR (Effexor XR) 75 MG 24 hr capsule     Sig: Take 1 capsule by mouth Daily.     Dispense:  90 capsule     Refill:  1    pantoprazole (Protonix) 40 MG EC tablet     Sig: Take 1 tablet by mouth Daily.     Dispense:  30 tablet     Refill:  1                 Follow Up     Return in about 3 months (around 6/20/2024) for prediabetes, reflux .    Patient was given instructions and counseling regarding her condition or for health maintenance advice. Please see specific information pulled into the AVS if appropriate.     Alyssa King MD   Internal Medicine-Pediatrics     Patient or patient representative verbalized consent for the use of Ambient Listening during the visit with  Alyssa King MD for chart documentation. 3/20/2024  09:25 EDT        "

## 2024-04-11 ENCOUNTER — TELEPHONE (OUTPATIENT)
Dept: INTERNAL MEDICINE | Facility: CLINIC | Age: 39
End: 2024-04-11
Payer: COMMERCIAL

## 2024-04-11 NOTE — TELEPHONE ENCOUNTER
Caller: Andreina Barnes    Relationship to patient: Self    Best call back number: [phone]    Patient is needing: PATIENT STATES FOR THE PAST TWO WEEKS SHE HAS BEEN HAVING SEVERE NIGHT SWEATS. PATIENT IS WANTING TO KNOW IF THERE IS ANYTHING MD ALBERTO RECOMMENDS OR IF SHE COULD COME AND SEE SOMEONE IN OFFICE FOR AN APPOINTMENT REGARDING THIS.

## 2024-04-16 NOTE — TELEPHONE ENCOUNTER
Attempted to call patient regarding night sweats. Left voicemail to call office back to schedule appointment this week.

## 2024-04-16 NOTE — TELEPHONE ENCOUNTER
Caller: Andreina Barnes    Relationship to patient: Self    Best call back number: [phone]     Patient is needing: THERE WERE NO APPOINTMENTS FOR THE SAME WEEK WITH ANY PROVIDER SO TRANSFERED TO FRONT OFFICE STAFF

## 2024-04-22 ENCOUNTER — TELEPHONE (OUTPATIENT)
Dept: INTERNAL MEDICINE | Facility: CLINIC | Age: 39
End: 2024-04-22
Payer: COMMERCIAL

## 2024-04-22 NOTE — TELEPHONE ENCOUNTER
Caller: BarnesAndreina    Relationship: Self    Best call back number: [phone]     Caller requesting test results: PATIENT    What test was performed: PFT    When was the test performed: 3/8/24    Where was the test performed: [facility]    Additional notes: PATIENT WOULD LIKE TO HAVE THE RESULTS OF THIS TEST.    HUB UNABLE TO WARM TRANSFER

## 2024-04-23 NOTE — TELEPHONE ENCOUNTER
Called and spoke with pt after speaking with provider, explained to pt per provider, PFT was normal and as of right now there are no concerns. Pt verbalized understanding and had no further questions at this time.

## 2024-04-23 NOTE — TELEPHONE ENCOUNTER
Attempted to call patient regarding PFT. Left voicemail to call office back, to notify this has been reviewed on 3/20 office visit.

## 2024-05-12 DIAGNOSIS — L68.0 HIRSUTISM: ICD-10-CM

## 2024-05-13 RX ORDER — SPIRONOLACTONE 50 MG/1
50 TABLET, FILM COATED ORAL DAILY
Qty: 90 TABLET | Refills: 1 | Status: SHIPPED | OUTPATIENT
Start: 2024-05-13

## 2024-05-16 ENCOUNTER — OFFICE VISIT (OUTPATIENT)
Dept: INTERNAL MEDICINE | Facility: CLINIC | Age: 39
End: 2024-05-16
Payer: COMMERCIAL

## 2024-05-16 VITALS
SYSTOLIC BLOOD PRESSURE: 118 MMHG | WEIGHT: 146.8 LBS | HEIGHT: 65 IN | BODY MASS INDEX: 24.46 KG/M2 | OXYGEN SATURATION: 98 % | TEMPERATURE: 97.5 F | DIASTOLIC BLOOD PRESSURE: 78 MMHG | RESPIRATION RATE: 14 BRPM | HEART RATE: 65 BPM

## 2024-05-16 DIAGNOSIS — Z71.1 WORRIED WELL: Primary | ICD-10-CM

## 2024-05-16 PROCEDURE — 99213 OFFICE O/P EST LOW 20 MIN: CPT | Performed by: NURSE PRACTITIONER

## 2024-05-16 NOTE — PROGRESS NOTES
"Chief Complaint  Spots (Swollen red/yellow spots on throat and tongue/)    Subjective        Andreina Barnes [place] [facility] for concerns for spots on the back of throat and tongue.  Patient states she has noticed these findings before, but never present during her visit with PCP or specialist.  They do not hurt, there is no drainage, feels like she has a couple in the very back of her throat, 1 on the side of the tongue, right side.    Objective   Vital Signs:   /78 (BP Location: Left arm, Patient Position: Sitting, Cuff Size: Adult)   Pulse 65   Temp 97.5 °F (36.4 °C) (Temporal)   Resp 14   Ht 165.1 cm (65\")   Wt 66.6 kg (146 lb 12.8 oz)   SpO2 98%   BMI 24.43 kg/m²     Physical Exam  Vitals and nursing note reviewed.   Constitutional:       Appearance: Normal appearance.   HENT:      Head: Normocephalic and atraumatic.      Right Ear: External ear normal.      Left Ear: External ear normal.      Nose: Nose normal.      Mouth/Throat:      Mouth: Mucous membranes are moist.      Pharynx: Oropharynx is clear.   Pulmonary:      Effort: Pulmonary effort is normal.   Neurological:      Mental Status: She is alert.        Result Review :  {The following data was reviewed by MAXINE Moore on 05/16/24                Diagnoses and all orders for this visit:    1. Worried well (Primary)    Reviewed records, does appear that patient has a tongue that does extend out further, which I believe what patient sees is normal findings of the base of the tongue, the tongue is just more rough in that area, there is no pain, no irritation, no obvious lesions.  In the back of her throat, I do not see anything significantly abnormal, there is some mucus in the back of the throat, there are some small pockets where mucus can stick, and I believe that is all she has seen.  Reassurance given today, if any new symptoms arise, can follow-up with PCP as needed.  I did recommend that she use " mouthwash daily, just to keep those small pockets clean and clear, discuss stones developing.    Follow Up   No follow-ups on file.  Patient was given instructions and counseling regarding her condition or for health maintenance advice. Please see specific information pulled into the AVS if appropriate.     Gómez Wills, APRN  5/16/2024  This note was electronically signed.

## 2024-06-12 ENCOUNTER — CLINICAL SUPPORT (OUTPATIENT)
Dept: INTERNAL MEDICINE | Facility: CLINIC | Age: 39
End: 2024-06-12
Payer: COMMERCIAL

## 2024-06-12 DIAGNOSIS — E55.9 VITAMIN D DEFICIENCY: ICD-10-CM

## 2024-06-12 DIAGNOSIS — E78.5 HYPERLIPIDEMIA, UNSPECIFIED HYPERLIPIDEMIA TYPE: ICD-10-CM

## 2024-06-12 DIAGNOSIS — E03.9 HYPOTHYROIDISM, UNSPECIFIED TYPE: ICD-10-CM

## 2024-06-12 DIAGNOSIS — E61.1 IRON DEFICIENCY: ICD-10-CM

## 2024-06-12 DIAGNOSIS — R73.03 PREDIABETES: ICD-10-CM

## 2024-06-12 DIAGNOSIS — E53.8 B12 DEFICIENCY: Primary | ICD-10-CM

## 2024-06-12 LAB
ALBUMIN SERPL-MCNC: 4.3 G/DL (ref 3.5–5.2)
ALBUMIN/GLOB SERPL: 1.4 G/DL
ALP SERPL-CCNC: 59 U/L (ref 39–117)
ALT SERPL W P-5'-P-CCNC: 20 U/L (ref 1–33)
ANION GAP SERPL CALCULATED.3IONS-SCNC: 9.4 MMOL/L (ref 5–15)
AST SERPL-CCNC: 19 U/L (ref 1–32)
BASOPHILS # BLD AUTO: 0.09 10*3/MM3 (ref 0–0.2)
BASOPHILS NFR BLD AUTO: 1 % (ref 0–1.5)
BILIRUB SERPL-MCNC: 0.9 MG/DL (ref 0–1.2)
BUN SERPL-MCNC: 8 MG/DL (ref 6–20)
BUN/CREAT SERPL: 9.9 (ref 7–25)
CALCIUM SPEC-SCNC: 9.7 MG/DL (ref 8.6–10.5)
CHLORIDE SERPL-SCNC: 103 MMOL/L (ref 98–107)
CHOLEST SERPL-MCNC: 182 MG/DL (ref 0–200)
CO2 SERPL-SCNC: 25.6 MMOL/L (ref 22–29)
CREAT SERPL-MCNC: 0.81 MG/DL (ref 0.57–1)
DEPRECATED RDW RBC AUTO: 41.2 FL (ref 37–54)
EGFRCR SERPLBLD CKD-EPI 2021: 96 ML/MIN/1.73
EOSINOPHIL # BLD AUTO: 0.39 10*3/MM3 (ref 0–0.4)
EOSINOPHIL NFR BLD AUTO: 4.2 % (ref 0.3–6.2)
ERYTHROCYTE [DISTWIDTH] IN BLOOD BY AUTOMATED COUNT: 12 % (ref 12.3–15.4)
GLOBULIN UR ELPH-MCNC: 3 GM/DL
GLUCOSE SERPL-MCNC: 117 MG/DL (ref 65–99)
HBA1C MFR BLD: 5.8 % (ref 4.8–5.6)
HCT VFR BLD AUTO: 40.7 % (ref 34–46.6)
HDLC SERPL-MCNC: 34 MG/DL (ref 40–60)
HGB BLD-MCNC: 13.6 G/DL (ref 12–15.9)
IMM GRANULOCYTES # BLD AUTO: 0.03 10*3/MM3 (ref 0–0.05)
IMM GRANULOCYTES NFR BLD AUTO: 0.3 % (ref 0–0.5)
LDLC SERPL CALC-MCNC: 124 MG/DL (ref 0–100)
LDLC/HDLC SERPL: 3.56 {RATIO}
LYMPHOCYTES # BLD AUTO: 3.33 10*3/MM3 (ref 0.7–3.1)
LYMPHOCYTES NFR BLD AUTO: 36.2 % (ref 19.6–45.3)
MCH RBC QN AUTO: 31.3 PG (ref 26.6–33)
MCHC RBC AUTO-ENTMCNC: 33.4 G/DL (ref 31.5–35.7)
MCV RBC AUTO: 93.6 FL (ref 79–97)
MONOCYTES # BLD AUTO: 0.77 10*3/MM3 (ref 0.1–0.9)
MONOCYTES NFR BLD AUTO: 8.4 % (ref 5–12)
NEUTROPHILS NFR BLD AUTO: 4.59 10*3/MM3 (ref 1.7–7)
NEUTROPHILS NFR BLD AUTO: 49.9 % (ref 42.7–76)
NRBC BLD AUTO-RTO: 0 /100 WBC (ref 0–0.2)
PLATELET # BLD AUTO: 249 10*3/MM3 (ref 140–450)
PMV BLD AUTO: 11.1 FL (ref 6–12)
POTASSIUM SERPL-SCNC: 4.5 MMOL/L (ref 3.5–5.2)
PROT SERPL-MCNC: 7.3 G/DL (ref 6–8.5)
RBC # BLD AUTO: 4.35 10*6/MM3 (ref 3.77–5.28)
SODIUM SERPL-SCNC: 138 MMOL/L (ref 136–145)
T4 FREE SERPL-MCNC: 1.18 NG/DL (ref 0.92–1.68)
TRIGL SERPL-MCNC: 134 MG/DL (ref 0–150)
TSH SERPL DL<=0.05 MIU/L-ACNC: 0.89 UIU/ML (ref 0.27–4.2)
VLDLC SERPL-MCNC: 24 MG/DL (ref 5–40)
WBC NRBC COR # BLD AUTO: 9.2 10*3/MM3 (ref 3.4–10.8)

## 2024-06-12 PROCEDURE — 84439 ASSAY OF FREE THYROXINE: CPT | Performed by: STUDENT IN AN ORGANIZED HEALTH CARE EDUCATION/TRAINING PROGRAM

## 2024-06-12 PROCEDURE — 80050 GENERAL HEALTH PANEL: CPT | Performed by: STUDENT IN AN ORGANIZED HEALTH CARE EDUCATION/TRAINING PROGRAM

## 2024-06-12 PROCEDURE — 80061 LIPID PANEL: CPT | Performed by: STUDENT IN AN ORGANIZED HEALTH CARE EDUCATION/TRAINING PROGRAM

## 2024-06-12 PROCEDURE — 36415 COLL VENOUS BLD VENIPUNCTURE: CPT | Performed by: STUDENT IN AN ORGANIZED HEALTH CARE EDUCATION/TRAINING PROGRAM

## 2024-06-12 PROCEDURE — 83036 HEMOGLOBIN GLYCOSYLATED A1C: CPT | Performed by: STUDENT IN AN ORGANIZED HEALTH CARE EDUCATION/TRAINING PROGRAM

## 2024-06-12 NOTE — PROGRESS NOTES
Venipuncture Blood Specimen Collection  Venipuncture performed in [facility] by Margie Johnson RN with good hemostasis. Patient tolerated the procedure well without complications.   06/12/24   Margie Johnson RN

## 2024-06-18 DIAGNOSIS — R13.10 DYSPHAGIA, UNSPECIFIED TYPE: ICD-10-CM

## 2024-06-18 RX ORDER — PANTOPRAZOLE SODIUM 40 MG/1
40 TABLET, DELAYED RELEASE ORAL DAILY
Qty: 30 TABLET | Refills: 1 | Status: SHIPPED | OUTPATIENT
Start: 2024-06-18

## 2024-06-19 ENCOUNTER — OFFICE VISIT (OUTPATIENT)
Dept: INTERNAL MEDICINE | Facility: CLINIC | Age: 39
End: 2024-06-19
Payer: COMMERCIAL

## 2024-06-19 VITALS
WEIGHT: 150.2 LBS | RESPIRATION RATE: 18 BRPM | SYSTOLIC BLOOD PRESSURE: 120 MMHG | BODY MASS INDEX: 24.99 KG/M2 | OXYGEN SATURATION: 98 % | HEART RATE: 89 BPM | DIASTOLIC BLOOD PRESSURE: 72 MMHG | TEMPERATURE: 97.2 F

## 2024-06-19 DIAGNOSIS — E78.00 HYPERCHOLESTEROLEMIA: ICD-10-CM

## 2024-06-19 DIAGNOSIS — T78.2XXS ANAPHYLAXIS, SEQUELA: ICD-10-CM

## 2024-06-19 DIAGNOSIS — Z01.118 ABNORMAL EAR EXAM: ICD-10-CM

## 2024-06-19 DIAGNOSIS — H92.01 EARACHE ON RIGHT: ICD-10-CM

## 2024-06-19 DIAGNOSIS — R73.03 PREDIABETES: Primary | ICD-10-CM

## 2024-06-19 RX ORDER — EPINEPHRINE 0.3 MG/.3ML
0.3 INJECTION SUBCUTANEOUS ONCE
Qty: 2 EACH | Refills: 1 | Status: SHIPPED | OUTPATIENT
Start: 2024-06-19 | End: 2024-06-19

## 2024-06-19 RX ORDER — DOXYCYCLINE HYCLATE 100 MG/1
100 CAPSULE ORAL 2 TIMES DAILY
Qty: 14 CAPSULE | Refills: 0 | Status: SHIPPED | OUTPATIENT
Start: 2024-06-19 | End: 2024-06-26

## 2024-06-19 NOTE — PROGRESS NOTES
"Chief Complaint  Heartburn (Follow up), Prediabetes, and Earache (Left ear, has been swimming a lot, and has been feeling lightheaded, dizzy. )    Subjective            Juan Manuely Arelis Barnes presents to [facility]  History of Present Illness    Prediabetes:  Stopped metformin about 3 weeks ago.   States she did tolerate it with when she takes it with her dinner but opted to stop the metformin due to decrease po intake.   She is working on eating 5 small meals a day to help w/ her reflux.     Reflux:   She was started on the pantoprazole at last visit and state that her symptoms are much better on this agent.     Endorses left ear lesion-present for a few weeks, thought it was a zit but never got to point that she needed to \"pop it\".   States she has been swimming more since the past weekend and feels of balance and dizzy over the past couple days. Feels like I've been drinking except she has not had any alcohol. No ear drainage.       Past Medical History:   Diagnosis Date    Anxiety     Dysthymia 9/5/2023    GERD (gastroesophageal reflux disease)     Hormone imbalance     Iron deficiency 9/5/2023    Mass of left breast     Migraine without aura, not refractory 9/5/2023       Allergies:   Allergies   Allergen Reactions    Amoxicillin Swelling and Rash    Morphine Hives    Penicillin G Swelling    Penicillin V Swelling    Nicoderm [Nicotine] Rash     Nicotine patches          Past Surgical History:   Procedure Laterality Date    APPENDECTOMY      BREAST LUMPECTOMY Left 9/20/2022    Procedure: Excision of left breast mass;  Surgeon: Savana Gaitan MD;  Location: [facility] [place];  Service: General;  Laterality: Left;    COLONOSCOPY N/A 7/20/2023    Procedure: COLONOSCOPY WITH BIOPSIES;  Surgeon: Jelly Borrero MD;  Location: [facility] ENDOSCOPY;  Service: Gastroenterology;  Laterality: N/A;  DIVERTICULOSIS    ENDOSCOPY N/A 7/20/2023    Procedure: " ESOPHAGOGASTRODUODENOSCOPY WITH BIOPSIES;  Surgeon: Jelly Borrero MD;  Location: [facility] ENDOSCOPY;  Service: Gastroenterology;  Laterality: N/A;  HIATAL HERNIA, GASTRITIS, ESOPHAGEAL NODULE    TONSILLECTOMY            Social History     Socioeconomic History    Marital status:    Tobacco Use    Smoking status: Some Days     Current packs/day: 0.25     Average packs/day: 0.3 packs/day for 20.0 years (5.0 ttl pk-yrs)     Types: Cigarettes    Smokeless tobacco: Never    Tobacco comments:     Down to 1 pack a week   Vaping Use    Vaping status: Every Day    Substances: Nicotine, NO VAPING 24 HR PRIOR TO SURGERY    Devices: Disposable   Substance and Sexual Activity    Alcohol use: Yes     Comment: occasional    Drug use: Yes     Types: Marijuana    Sexual activity: Yes     Partners: Male     Birth control/protection: Vasectomy         Family History   Problem Relation Age of Onset    Liver cancer Mother 50        started in gallbladder, they removed that and it went to liver    Colon cancer Paternal Grandfather     Diabetes Maternal Grandmother     Diabetes Maternal Uncle     Malig Hyperthermia Neg Hx     Breast cancer Neg Hx     Ovarian cancer Neg Hx     Uterine cancer Neg Hx     Melanoma Neg Hx     Clotting disorder Neg Hx     Deep vein thrombosis Neg Hx     Pulmonary embolism Neg Hx           Health Maintenance Due   Topic Date Due    Pneumococcal Vaccine 0-64 (1 of 2 - PCV) Never done    ANNUAL PHYSICAL  Never done    PAP SMEAR  Never done    COVID-19 Vaccine (1 - 2023-24 season) Never done            Current Outpatient Medications:     albuterol sulfate  (90 Base) MCG/ACT inhaler, Inhale 2 puffs Every 4 (Four) Hours As Needed for Wheezing or Shortness of Air., Disp: 18 g, Rfl: 0    busPIRone (BUSPAR) 5 MG tablet, Take 1 tablet by mouth 2 (Two) Times a Day., Disp: 60 tablet, Rfl: 1    cholecalciferol (VITAMIN D3) 1.25 MG (28077 UT) capsule, TAKE 1 CAPSULE BY MOUTH ONCE A WEEK AS DIRECTED for  84, Disp: , Rfl:     fluticasone (FLONASE) 50 MCG/ACT nasal spray, 2 sprays into the nostril(s) as directed by provider Daily., Disp: 16 g, Rfl: 2    pantoprazole (PROTONIX) 40 MG EC tablet, TAKE 1 TABLET BY MOUTH DAILY, Disp: 30 tablet, Rfl: 1    spironolactone (ALDACTONE) 50 MG tablet, TAKE 1 TABLET BY MOUTH DAILY, Disp: 90 tablet, Rfl: 1    venlafaxine XR (Effexor XR) 75 MG 24 hr capsule, Take 1 capsule by mouth Daily., Disp: 90 capsule, Rfl: 1    vitamin B-12 (CYANOCOBALAMIN) 1000 MCG tablet, Take 1 tablet by mouth Daily., Disp: , Rfl:     vitamin D (ERGOCALCIFEROL) 1.25 MG (09321 UT) capsule capsule, TAKE 1 CAPSULE BY MOUTH 1 TIME EVERY WEEK FOR 12 DOSES, Disp: 12 capsule, Rfl: 1    EPINEPHrine (EPIPEN) 0.3 MG/0.3ML solution auto-injector injection, ADMINISTER 0.3 ML IN THE MUSCLE 1 TIME FOR 1 DOSE AS DIRECTED BY PRESCRIBER, Disp: , Rfl:       Immunization History   Administered Date(s) Administered    Tdap 01/20/2022         Review of Systems       Objective       Vitals:    06/19/24 1532   BP: 120/72   BP Location: Left arm   Patient Position: Sitting   Cuff Size: Adult   Pulse: 89   Resp: 18   Temp: 97.2 °F (36.2 °C)   TempSrc: Temporal   SpO2: 98%   Weight: 68.1 kg (150 lb 3.2 oz)     Body mass index is 24.99 kg/m².      Physical Exam  Vitals reviewed.   Constitutional:       Appearance: Normal appearance.   HENT:      Head: Normocephalic and atraumatic.      Right Ear: A middle ear effusion is present. Tympanic membrane is injected and bulging.      Left Ear:  No middle ear effusion. Tympanic membrane is not injected or bulging.      Nose: Nose normal.   Eyes:      Extraocular Movements: Extraocular movements intact.      Conjunctiva/sclera: Conjunctivae normal.   Cardiovascular:      Rate and Rhythm: Normal rate and regular rhythm.      Pulses: Normal pulses.      Heart sounds: Normal heart sounds.   Pulmonary:      Effort: Pulmonary effort is normal. No respiratory distress.   Musculoskeletal:          General: Normal range of motion.   Skin:     General: Skin is warm and dry.   Neurological:      General: No focal deficit present.      Mental Status: She is alert and oriented to person, place, and time.      Cranial Nerves: No cranial nerve deficit.   Psychiatric:         Mood and Affect: Mood normal.         Behavior: Behavior normal.         Thought Content: Thought content normal.             Result Review :     The following data was reviewed by: Alyssa King MD on 06/19/2024:    Common labs          10/23/2023    17:46 2/1/2024    08:18 6/12/2024    11:22   Common Labs   Glucose 129  122  117    BUN 9  9  8    Creatinine 0.84  0.87  0.81    Sodium 137  136  138    Potassium 3.6  4.3  4.5    Chloride 101  99  103    Calcium 9.6  9.8  9.7    Albumin 4.4  4.5  4.3    Total Bilirubin 0.7  1.3  0.9    Alkaline Phosphatase 57  51  59    AST (SGOT) 16  12  19    ALT (SGPT) 17  12  20    WBC 11.07  9.56  9.20    Hemoglobin 13.3  13.9  13.6    Hematocrit 40.2  40.7  40.7    Platelets 242  259  249    Total Cholesterol  156  182    Triglycerides  146  134    HDL Cholesterol  30  34    LDL Cholesterol   100  124    Hemoglobin A1C  5.90  5.80      A1C Last 3 Results          2/1/2024    08:18 6/12/2024    11:22   HGBA1C Last 3 Results   Hemoglobin A1C 5.90  5.80                      Assessment and Plan      Diagnoses and all orders for this visit:    1. Prediabetes (Primary)  Comments:  Chronic, stable. Continue current regimen  Orders:  -     CBC & Differential; Future  -     Comprehensive Metabolic Panel; Future  -     Hemoglobin A1c; Future  -     Lipid Panel; Future  -     TSH; Future  -     T4, Free; Future    2. Hypercholesterolemia  Comments:  chronic w/ some interval worsening. Recomemd pt continues to work on exercise and avoiding processed and fatty food.    3. Earache on right  4. Abnormal ear exam  Acute, concerning for possible AOM. Abx went in, pt encouraged to GISAP to schedule f/u.  -     doxycycline  (VIBRAMYCIN) 100 MG capsule; Take 1 capsule by mouth 2 (Two) Times a Day for 7 days.  Dispense: 14 capsule; Refill: 0    5. Anaphylaxis, sequela  Hx of anaphylaxis w/ certain abx. Epipen sent in.       EPINEPHrine (EpiPen 2-William) 0.3 MG/0.3ML solution auto-injector injection; Inject 0.3 mL into the appropriate muscle as directed by prescriber 1 (One) Time for 1 dose.  Dispense: 2 each; Refill: 1          Orders Placed This Encounter   Procedures    Comprehensive Metabolic Panel    Hemoglobin A1c    Lipid Panel    TSH    T4, Free    CBC & Differential     New Medications Ordered This Visit   Medications    EPINEPHrine (EpiPen 2-William) 0.3 MG/0.3ML solution auto-injector injection     Sig: Inject 0.3 mL into the appropriate muscle as directed by prescriber 1 (One) Time for 1 dose.     Dispense:  2 each     Refill:  1    doxycycline (VIBRAMYCIN) 100 MG capsule     Sig: Take 1 capsule by mouth 2 (Two) Times a Day for 7 days.     Dispense:  14 capsule     Refill:  0                     Follow Up     Return in about 6 weeks (around 7/31/2024) for PAP .    Patient was given instructions and counseling regarding her condition or for health maintenance advice. Please see specific information pulled into the AVS if appropriate.     Alyssa King MD   Internal Medicine-Pediatrics

## 2024-07-01 ENCOUNTER — OFFICE VISIT (OUTPATIENT)
Dept: GASTROENTEROLOGY | Facility: CLINIC | Age: 39
End: 2024-07-01
Payer: COMMERCIAL

## 2024-07-01 VITALS
BODY MASS INDEX: 24.53 KG/M2 | WEIGHT: 147.2 LBS | OXYGEN SATURATION: 99 % | HEIGHT: 65 IN | SYSTOLIC BLOOD PRESSURE: 105 MMHG | DIASTOLIC BLOOD PRESSURE: 62 MMHG | HEART RATE: 64 BPM

## 2024-07-01 DIAGNOSIS — K21.9 GASTROESOPHAGEAL REFLUX DISEASE, UNSPECIFIED WHETHER ESOPHAGITIS PRESENT: Primary | ICD-10-CM

## 2024-07-01 DIAGNOSIS — R14.0 ABDOMINAL BLOATING: ICD-10-CM

## 2024-07-01 RX ORDER — EPINEPHRINE 0.3 MG/.3ML
INJECTION SUBCUTANEOUS
COMMUNITY
Start: 2024-06-19

## 2024-07-01 NOTE — PROGRESS NOTES
Chief Complaint     Follow-up (EGD /Colonoscopy )    History of Present Illness     Andreina Barnes is a 38 y.o. female who presents to [facility] GASTROENTEROLOGY for follow-up of abdominal bloating, heartburn, constipation and diarrhea.      She feels that reflux is improved with protonix per PCP.  Swallowing is improved since starting protonix.  She was evaluated by ENT who felt dysphagia was related to reflux.      Bowel movements are improved.  No longer needing probiotic.      Continues to experience abdominal bloating that's worse after dinner.          History      Past Medical History:   Diagnosis Date    Anxiety     Dysthymia 9/5/2023    GERD (gastroesophageal reflux disease)     Hormone imbalance     Iron deficiency 9/5/2023    Mass of left breast     Migraine without aura, not refractory 9/5/2023     Past Surgical History:   Procedure Laterality Date    APPENDECTOMY      BREAST LUMPECTOMY Left 9/20/2022    Procedure: Excision of left breast mass;  Surgeon: Savana Gaitan MD;  Location: [facility] [place];  Service: General;  Laterality: Left;    COLONOSCOPY N/A 7/20/2023    Procedure: COLONOSCOPY WITH BIOPSIES;  Surgeon: Jelly Borrero MD;  Location: [facility] ENDOSCOPY;  Service: Gastroenterology;  Laterality: N/A;  DIVERTICULOSIS    ENDOSCOPY N/A 7/20/2023    Procedure: ESOPHAGOGASTRODUODENOSCOPY WITH BIOPSIES;  Surgeon: Jelly Borrero MD;  Location: [facility] ENDOSCOPY;  Service: Gastroenterology;  Laterality: N/A;  HIATAL HERNIA, GASTRITIS, ESOPHAGEAL NODULE    TONSILLECTOMY       Family History   Problem Relation Age of Onset    Liver cancer Mother 50        started in gallbladder, they removed that and it went to liver    Colon cancer Paternal Grandfather     Diabetes Maternal Grandmother     Diabetes Maternal Uncle     Malig Hyperthermia Neg Hx     Breast cancer Neg Hx     Ovarian cancer Neg Hx     Uterine cancer Neg Hx     Melanoma Neg Hx     Clotting disorder Neg  "Hx     Deep vein thrombosis Neg Hx     Pulmonary embolism Neg Hx         Current Medications       Current Outpatient Medications:     albuterol sulfate  (90 Base) MCG/ACT inhaler, Inhale 2 puffs Every 4 (Four) Hours As Needed for Wheezing or Shortness of Air., Disp: 18 g, Rfl: 0    busPIRone (BUSPAR) 5 MG tablet, Take 1 tablet by mouth 2 (Two) Times a Day., Disp: 60 tablet, Rfl: 1    cholecalciferol (VITAMIN D3) 1.25 MG (72202 UT) capsule, TAKE 1 CAPSULE BY MOUTH ONCE A WEEK AS DIRECTED for 84, Disp: , Rfl:     EPINEPHrine (EPIPEN) 0.3 MG/0.3ML solution auto-injector injection, ADMINISTER 0.3 ML IN THE MUSCLE 1 TIME FOR 1 DOSE AS DIRECTED BY PRESCRIBER, Disp: , Rfl:     fluticasone (FLONASE) 50 MCG/ACT nasal spray, 2 sprays into the nostril(s) as directed by provider Daily., Disp: 16 g, Rfl: 2    pantoprazole (PROTONIX) 40 MG EC tablet, TAKE 1 TABLET BY MOUTH DAILY, Disp: 30 tablet, Rfl: 1    spironolactone (ALDACTONE) 50 MG tablet, TAKE 1 TABLET BY MOUTH DAILY, Disp: 90 tablet, Rfl: 1    venlafaxine XR (Effexor XR) 75 MG 24 hr capsule, Take 1 capsule by mouth Daily., Disp: 90 capsule, Rfl: 1    vitamin B-12 (CYANOCOBALAMIN) 1000 MCG tablet, Take 1 tablet by mouth Daily., Disp: , Rfl:     vitamin D (ERGOCALCIFEROL) 1.25 MG (58835 UT) capsule capsule, TAKE 1 CAPSULE BY MOUTH 1 TIME EVERY WEEK FOR 12 DOSES, Disp: 12 capsule, Rfl: 1     Allergies     Allergies   Allergen Reactions    Amoxicillin Swelling and Rash    Morphine Hives    Penicillin G Swelling    Penicillin V Swelling    Nicoderm [Nicotine] Rash     Nicotine patches       Social History       Social History     Social History Narrative    Not on file         Objective       /62 (BP Location: Left arm, Patient Position: Sitting, Cuff Size: Adult)   Pulse 64   Ht 165.1 cm (65\")   Wt 66.8 kg (147 lb 3.2 oz)   SpO2 99%   BMI 24.50 kg/m²       Physical Exam    Results       Result Review :    The following data was reviewed by: Margie BOX" MAXINE Garcia on 07/01/2024:    CBC w/diff          10/23/2023    17:46 2/1/2024    08:18 6/12/2024    11:22   CBC w/Diff   WBC 11.07  9.56  9.20    RBC 4.39  4.42  4.35    Hemoglobin 13.3  13.9  13.6    Hematocrit 40.2  40.7  40.7    MCV 91.6  92.1  93.6    MCH 30.3  31.4  31.3    MCHC 33.1  34.2  33.4    RDW 12.3  12.2  12.0    Platelets 242  259  249    Neutrophil Rel % 48.5  44.6  49.9    Immature Granulocyte Rel % 0.4  0.2  0.3    Lymphocyte Rel % 39.9  41.1  36.2    Monocyte Rel % 7.3  9.1  8.4    Eosinophil Rel % 3.2  4.3  4.2    Basophil Rel % 0.7  0.7  1.0      CMP          10/23/2023    17:46 2/1/2024    08:18 6/12/2024    11:22   CMP   Glucose 129  122  117    BUN 9  9  8    Creatinine 0.84  0.87  0.81    EGFR 91.9  88.1  96.0    Sodium 137  136  138    Potassium 3.6  4.3  4.5    Chloride 101  99  103    Calcium 9.6  9.8  9.7    Total Protein 7.2  7.0  7.3    Albumin 4.4  4.5  4.3    Globulin 2.8  2.5  3.0    Total Bilirubin 0.7  1.3  0.9    Alkaline Phosphatase 57  51  59    AST (SGOT) 16  12  19    ALT (SGPT) 17  12  20    Albumin/Globulin Ratio 1.6  1.8  1.4    BUN/Creatinine Ratio 10.7  10.3  9.9    Anion Gap 10.7  11.0  9.4      7/20/2023 EGD-3 mm nodule in the upper third of the esophagus, 20 cm from the incisors, biopsy-squamous papilloma.  Completely removed.  Esophagus was otherwise normal.  Small hiatal hernia.  Erythema in the antrum, Biopsy-negative for H. pylori.  Normal duodenum.  Colonoscopy-perianal and digital rectal exams were normal.  Terminal ileum appeared normal.  Diverticulosis throughout the colon.  Random colon biopsy-negative for microscopic colitis.    5/5/2023 celiac serology-negative.    3/8/2024 modified barium swallow with speech-prominent cricopharyngeus muscle appreciated on exam without functional obstruction.       Assessment and Plan              Diagnoses and all orders for this visit:    1. Gastroesophageal reflux disease, unspecified whether esophagitis present  (Primary)    2. Abdominal bloating      Recommend to continue Protonix per primary care provider.  Encourage patient to decrease meal size in the evening to help with abdominal bloating.  May try over-the-counter Pepcid for breakthrough heartburn and bloating.  Patient verbalized understanding.          Follow Up     Follow Up   Return if symptoms worsen or fail to improve, for GERD.  Patient was given instructions and counseling regarding her condition or for health maintenance advice. Please see specific information pulled into the AVS if appropriate.

## 2024-07-01 NOTE — PATIENT INSTRUCTIONS
Can take over the counter pepcid as needed for breakthrough reflux in the evening.      Food Choices for Gastroesophageal Reflux Disease, Adult  When you have gastroesophageal reflux disease (GERD), the foods you eat and your eating habits are very important. Choosing the right foods can help ease your discomfort. Think about working with a food expert (dietitian) to help you make good choices.  What are tips for following this plan?  Reading food labels  Look for foods that are low in saturated fat. Foods that may help with your symptoms include:  Foods that have less than 5% of daily value (DV) of fat.  Foods that have 0 grams of trans fat.  Cooking  Do not garduno your food.  Cook your food by baking, steaming, grilling, or broiling. These are all methods that do not need a lot of fat for cooking.  To add flavor, try to use herbs that are low in spice and acidity.  Meal planning    Choose healthy foods that are low in fat, such as:  Fruits and vegetables.  Whole grains.  Low-fat dairy products.  Lean meats, fish, and poultry.  Eat small meals often instead of eating 3 large meals each day. Eat your meals slowly in a place where you are relaxed. Avoid bending over or lying down until 2-3 hours after eating.  Limit high-fat foods such as fatty meats or fried foods.  Limit your intake of fatty foods, such as oils, butter, and shortening.  Avoid the following as told by your doctor:  Foods that cause symptoms. These may be different for different people. Keep a food diary to keep track of foods that cause symptoms.  Alcohol.  Drinking a lot of liquid with meals.  Eating meals during the 2-3 hours before bed.  Lifestyle  Stay at a healthy weight. Ask your doctor what weight is healthy for you. If you need to lose weight, work with your doctor to do so safely.  Exercise for at least 30 minutes on 5 or more days each week, or as told by your doctor.  Wear loose-fitting clothes.  Do not smoke or use any products that contain  nicotine or tobacco. If you need help quitting, ask your doctor.  Sleep with the head of your bed higher than your feet. Use a wedge under the mattress or blocks under the bed frame to raise the head of the bed.  Chew sugar-free gum after meals.  What foods should eat?    Eat a healthy, well-balanced diet of fruits, vegetables, whole grains, low-fat dairy products, lean meats, fish, and poultry. Each person is different.  Foods that may cause symptoms in one person may not cause any symptoms in another person. Work with your doctor to find foods that are safe for you.  The items listed above may not be a complete list of what you can eat and drink. Contact a food expert for more options.  What foods should I avoid?  Limiting some of these foods may help in managing the symptoms of GERD. Everyone is different. Talk with a food expert or your doctor to help you find the exact foods to avoid, if any.  Fruits  Any fruits prepared with added fat. Any fruits that cause symptoms. For some people, this may include citrus fruits, such as oranges, grapefruit, pineapple, and gerard.  Vegetables  Deep-fried vegetables. French fries. Any vegetables prepared with added fat. Any vegetables that cause symptoms. For some people, this may include tomatoes and tomato products, chili peppers, onions and garlic, and horseradish.  Grains  Pastries or quick breads with added fat.  Meats and other proteins  High-fat meats, such as fatty beef or pork, hot dogs, ribs, ham, sausage, salami, and hatch. Fried meat or protein, including fried fish and fried chicken. Nuts and nut butters, in large amounts.  Dairy  Whole milk and chocolate milk. Sour cream. Cream. Ice cream. Cream cheese. Milkshakes.  Fats and oils  Butter. Margarine. Shortening. Ghee.  Beverages  Coffee and tea, with or without caffeine. Carbonated beverages. Sodas. Energy drinks. Fruit juice made with acidic fruits, such as orange or grapefruit. Tomato juice. Alcoholic  drinks.  Sweets and desserts  Chocolate and cocoa. Donuts.  Seasonings and condiments  Pepper. Peppermint and spearmint. Added salt. Any condiments, herbs, or seasonings that cause symptoms. For some people, this may include gardner, hot sauce, or vinegar-based salad dressings.  The items listed above may not be a complete list of what you should not eat and drink. Contact a food expert for more options.  Questions to ask your doctor  Diet and lifestyle changes are often the first steps that are taken to manage symptoms of GERD. If diet and lifestyle changes do not help, talk with your doctor about taking medicines.  Where to find more information  International [employer] for Gastrointestinal Disorders: aboutgerd.org  Summary  When you have GERD, food and lifestyle choices are very important in easing your symptoms.  Eat small meals often instead of 3 large meals a day. Eat your meals slowly and in a place where you are relaxed.  Avoid bending over or lying down until 2-3 hours after eating.  Limit high-fat foods such as fatty meats or fried foods.  This information is not intended to replace advice given to you by your health care provider. Make sure you discuss any questions you have with your health care provider.  Document Revised: 06/28/2021 Document Reviewed: 06/28/2021  Elsevier Patient Education © 2024 [employer]

## 2024-07-29 ENCOUNTER — TELEPHONE (OUTPATIENT)
Dept: INTERNAL MEDICINE | Facility: CLINIC | Age: 39
End: 2024-07-29
Payer: COMMERCIAL

## 2024-07-29 NOTE — TELEPHONE ENCOUNTER
Called patient's home and spoke with her in reference to appointment scheduled for this afternoon with nurse / MA to have labs drawn.  Explained that the orders Dr. Shah has in for the patient are due mid-September (9/19/2024) and given that she had labs completed recently (6/19/2024) it is a little too early to have those drawn today.  Patient confirmed she has an appointment with Dr. Shah in August.  Told her that we would see her then and if she wanted something checked at that time we could do that for her.  Offered further assistance as needed.    Margie Johnson, RN BSN  [facility], [place] office

## 2024-07-30 NOTE — TELEPHONE ENCOUNTER
Noted. Her appointment for August if for her pap. Can you call her and find out if she would prefer to schedule her f/u for mid to late Sept so we can do her lab result review at the same time?

## 2024-08-28 DIAGNOSIS — R13.10 DYSPHAGIA, UNSPECIFIED TYPE: ICD-10-CM

## 2024-08-28 RX ORDER — PANTOPRAZOLE SODIUM 40 MG/1
40 TABLET, DELAYED RELEASE ORAL DAILY
Qty: 30 TABLET | Refills: 1 | Status: SHIPPED | OUTPATIENT
Start: 2024-08-28

## 2024-08-29 RX ORDER — PANTOPRAZOLE SODIUM 40 MG/1
40 TABLET, DELAYED RELEASE ORAL DAILY
Qty: 90 TABLET | OUTPATIENT
Start: 2024-08-29

## 2024-09-13 ENCOUNTER — CLINICAL SUPPORT (OUTPATIENT)
Dept: INTERNAL MEDICINE | Facility: CLINIC | Age: 39
End: 2024-09-13
Payer: COMMERCIAL

## 2024-09-13 DIAGNOSIS — E78.5 HYPERLIPIDEMIA, UNSPECIFIED HYPERLIPIDEMIA TYPE: ICD-10-CM

## 2024-09-13 DIAGNOSIS — E53.8 B12 DEFICIENCY: Primary | ICD-10-CM

## 2024-09-13 DIAGNOSIS — E61.1 IRON DEFICIENCY: ICD-10-CM

## 2024-09-13 DIAGNOSIS — R73.03 PREDIABETES: ICD-10-CM

## 2024-09-13 LAB
ALBUMIN SERPL-MCNC: 4.7 G/DL (ref 3.5–5.2)
ALBUMIN/GLOB SERPL: 1.9 G/DL
ALP SERPL-CCNC: 62 U/L (ref 39–117)
ALT SERPL W P-5'-P-CCNC: 13 U/L (ref 1–33)
ANION GAP SERPL CALCULATED.3IONS-SCNC: 7.1 MMOL/L (ref 5–15)
AST SERPL-CCNC: 17 U/L (ref 1–32)
BASOPHILS # BLD AUTO: 0.07 10*3/MM3 (ref 0–0.2)
BASOPHILS NFR BLD AUTO: 0.7 % (ref 0–1.5)
BILIRUB SERPL-MCNC: 0.8 MG/DL (ref 0–1.2)
BUN SERPL-MCNC: 8 MG/DL (ref 6–20)
BUN/CREAT SERPL: 7.7 (ref 7–25)
CALCIUM SPEC-SCNC: 10.3 MG/DL (ref 8.6–10.5)
CHLORIDE SERPL-SCNC: 101 MMOL/L (ref 98–107)
CHOLEST SERPL-MCNC: 194 MG/DL (ref 0–200)
CO2 SERPL-SCNC: 27.9 MMOL/L (ref 22–29)
CREAT SERPL-MCNC: 1.04 MG/DL (ref 0.57–1)
DEPRECATED RDW RBC AUTO: 40.5 FL (ref 37–54)
EGFRCR SERPLBLD CKD-EPI 2021: 70.7 ML/MIN/1.73
EOSINOPHIL # BLD AUTO: 0.37 10*3/MM3 (ref 0–0.4)
EOSINOPHIL NFR BLD AUTO: 3.7 % (ref 0.3–6.2)
ERYTHROCYTE [DISTWIDTH] IN BLOOD BY AUTOMATED COUNT: 11.8 % (ref 12.3–15.4)
GLOBULIN UR ELPH-MCNC: 2.5 GM/DL
GLUCOSE SERPL-MCNC: 101 MG/DL (ref 65–99)
HBA1C MFR BLD: 5.4 % (ref 4.8–5.6)
HCT VFR BLD AUTO: 42.4 % (ref 34–46.6)
HDLC SERPL-MCNC: 28 MG/DL (ref 40–60)
HGB BLD-MCNC: 14.1 G/DL (ref 12–15.9)
IMM GRANULOCYTES # BLD AUTO: 0.04 10*3/MM3 (ref 0–0.05)
IMM GRANULOCYTES NFR BLD AUTO: 0.4 % (ref 0–0.5)
LDLC SERPL CALC-MCNC: 121 MG/DL (ref 0–100)
LDLC/HDLC SERPL: 4.11 {RATIO}
LYMPHOCYTES # BLD AUTO: 3.23 10*3/MM3 (ref 0.7–3.1)
LYMPHOCYTES NFR BLD AUTO: 32.4 % (ref 19.6–45.3)
MCH RBC QN AUTO: 31.2 PG (ref 26.6–33)
MCHC RBC AUTO-ENTMCNC: 33.3 G/DL (ref 31.5–35.7)
MCV RBC AUTO: 93.8 FL (ref 79–97)
MONOCYTES # BLD AUTO: 0.72 10*3/MM3 (ref 0.1–0.9)
MONOCYTES NFR BLD AUTO: 7.2 % (ref 5–12)
NEUTROPHILS NFR BLD AUTO: 5.54 10*3/MM3 (ref 1.7–7)
NEUTROPHILS NFR BLD AUTO: 55.6 % (ref 42.7–76)
NRBC BLD AUTO-RTO: 0 /100 WBC (ref 0–0.2)
PLATELET # BLD AUTO: 292 10*3/MM3 (ref 140–450)
PMV BLD AUTO: 11.2 FL (ref 6–12)
POTASSIUM SERPL-SCNC: 4 MMOL/L (ref 3.5–5.2)
PROT SERPL-MCNC: 7.2 G/DL (ref 6–8.5)
RBC # BLD AUTO: 4.52 10*6/MM3 (ref 3.77–5.28)
SODIUM SERPL-SCNC: 136 MMOL/L (ref 136–145)
T4 FREE SERPL-MCNC: 1.15 NG/DL (ref 0.92–1.68)
TRIGL SERPL-MCNC: 254 MG/DL (ref 0–150)
TSH SERPL DL<=0.05 MIU/L-ACNC: 0.71 UIU/ML (ref 0.27–4.2)
VLDLC SERPL-MCNC: 45 MG/DL (ref 5–40)
WBC NRBC COR # BLD AUTO: 9.97 10*3/MM3 (ref 3.4–10.8)

## 2024-09-13 PROCEDURE — 80061 LIPID PANEL: CPT | Performed by: STUDENT IN AN ORGANIZED HEALTH CARE EDUCATION/TRAINING PROGRAM

## 2024-09-13 PROCEDURE — 84439 ASSAY OF FREE THYROXINE: CPT | Performed by: STUDENT IN AN ORGANIZED HEALTH CARE EDUCATION/TRAINING PROGRAM

## 2024-09-13 PROCEDURE — 80050 GENERAL HEALTH PANEL: CPT | Performed by: STUDENT IN AN ORGANIZED HEALTH CARE EDUCATION/TRAINING PROGRAM

## 2024-09-13 PROCEDURE — 36415 COLL VENOUS BLD VENIPUNCTURE: CPT | Performed by: STUDENT IN AN ORGANIZED HEALTH CARE EDUCATION/TRAINING PROGRAM

## 2024-09-13 PROCEDURE — 83036 HEMOGLOBIN GLYCOSYLATED A1C: CPT | Performed by: STUDENT IN AN ORGANIZED HEALTH CARE EDUCATION/TRAINING PROGRAM

## 2024-09-13 NOTE — PROGRESS NOTES
Venipuncture Blood Specimen Collection  Venipuncture performed in [facility] by Margie Johnson RN with good hemostasis. Patient tolerated the procedure well without complications.   09/13/24   Margie Johnson RN

## 2024-09-20 ENCOUNTER — OFFICE VISIT (OUTPATIENT)
Dept: INTERNAL MEDICINE | Facility: CLINIC | Age: 39
End: 2024-09-20
Payer: COMMERCIAL

## 2024-09-20 VITALS
TEMPERATURE: 96.4 F | BODY MASS INDEX: 23.76 KG/M2 | HEIGHT: 65 IN | WEIGHT: 142.6 LBS | OXYGEN SATURATION: 98 % | SYSTOLIC BLOOD PRESSURE: 118 MMHG | HEART RATE: 78 BPM | DIASTOLIC BLOOD PRESSURE: 72 MMHG

## 2024-09-20 DIAGNOSIS — B96.89 BACTERIAL VAGINOSIS: ICD-10-CM

## 2024-09-20 DIAGNOSIS — R73.03 PREDIABETES: ICD-10-CM

## 2024-09-20 DIAGNOSIS — N76.0 BACTERIAL VAGINOSIS: ICD-10-CM

## 2024-09-20 DIAGNOSIS — Z00.00 ANNUAL PHYSICAL EXAM: Primary | ICD-10-CM

## 2024-09-20 DIAGNOSIS — E78.2 MIXED HYPERLIPIDEMIA: ICD-10-CM

## 2024-09-20 DIAGNOSIS — Z12.4 PAP SMEAR FOR CERVICAL CANCER SCREENING: ICD-10-CM

## 2024-09-20 DIAGNOSIS — N64.4 BREAST PAIN: ICD-10-CM

## 2024-09-20 LAB
C TRACH RRNA CVX QL NAA+PROBE: NOT DETECTED
CANDIDA SPECIES: NEGATIVE
GARDNERELLA VAGINALIS: POSITIVE
N GONORRHOEA RRNA SPEC QL NAA+PROBE: NOT DETECTED
T VAGINALIS DNA VAG QL PROBE+SIG AMP: NEGATIVE

## 2024-09-20 PROCEDURE — 87591 N.GONORRHOEAE DNA AMP PROB: CPT | Performed by: STUDENT IN AN ORGANIZED HEALTH CARE EDUCATION/TRAINING PROGRAM

## 2024-09-20 PROCEDURE — 99214 OFFICE O/P EST MOD 30 MIN: CPT | Performed by: STUDENT IN AN ORGANIZED HEALTH CARE EDUCATION/TRAINING PROGRAM

## 2024-09-20 PROCEDURE — 87660 TRICHOMONAS VAGIN DIR PROBE: CPT | Performed by: STUDENT IN AN ORGANIZED HEALTH CARE EDUCATION/TRAINING PROGRAM

## 2024-09-20 PROCEDURE — G0123 SCREEN CERV/VAG THIN LAYER: HCPCS | Performed by: STUDENT IN AN ORGANIZED HEALTH CARE EDUCATION/TRAINING PROGRAM

## 2024-09-20 PROCEDURE — 87491 CHLMYD TRACH DNA AMP PROBE: CPT | Performed by: STUDENT IN AN ORGANIZED HEALTH CARE EDUCATION/TRAINING PROGRAM

## 2024-09-20 PROCEDURE — 99395 PREV VISIT EST AGE 18-39: CPT | Performed by: STUDENT IN AN ORGANIZED HEALTH CARE EDUCATION/TRAINING PROGRAM

## 2024-09-20 PROCEDURE — 87480 CANDIDA DNA DIR PROBE: CPT | Performed by: STUDENT IN AN ORGANIZED HEALTH CARE EDUCATION/TRAINING PROGRAM

## 2024-09-20 PROCEDURE — 87510 GARDNER VAG DNA DIR PROBE: CPT | Performed by: STUDENT IN AN ORGANIZED HEALTH CARE EDUCATION/TRAINING PROGRAM

## 2024-09-20 RX ORDER — METRONIDAZOLE 500 MG/1
500 TABLET ORAL 2 TIMES DAILY
Qty: 14 TABLET | Refills: 0 | Status: SHIPPED | OUTPATIENT
Start: 2024-09-20 | End: 2024-09-27

## 2024-09-26 DIAGNOSIS — N64.4 BREAST PAIN: Primary | ICD-10-CM

## 2024-09-26 LAB
CONV .: NORMAL
CYTOLOGIST CVX/VAG CYTO: NORMAL
CYTOLOGY CVX/VAG DOC CYTO: NORMAL
CYTOLOGY CVX/VAG DOC THIN PREP: NORMAL
DX ICD CODE: NORMAL
Lab: NORMAL
Lab: NORMAL
OTHER STN SPEC: NORMAL
STAT OF ADQ CVX/VAG CYTO-IMP: NORMAL

## 2024-10-04 ENCOUNTER — TELEPHONE (OUTPATIENT)
Dept: INTERNAL MEDICINE | Facility: CLINIC | Age: 39
End: 2024-10-04
Payer: COMMERCIAL

## 2024-10-04 RX ORDER — ERGOCALCIFEROL 1.25 MG/1
CAPSULE, LIQUID FILLED ORAL
Qty: 12 CAPSULE | Refills: 1 | Status: SHIPPED | OUTPATIENT
Start: 2024-10-04

## 2024-10-04 NOTE — TELEPHONE ENCOUNTER
Caller: Andreina Barnes    Relationship: Self    Best call back number: [phone]      What medication are you requesting: Z-PACK  IF POSSIBLE     What are your current symptoms: COUGHING, CONGESTION AND FATIGUE     How long have you been experiencing symptoms:       10/3/24    If a prescription is needed, what is your preferred pharmacy and phone number: [id number] - [place], [place] - [address] AT [facility] OF [address] [place] & [place] - [phone] [facility] [phone] FX     Additional notes:      THE PATIENT WAS EXPOSED TO WALKING  PNEUMONIA BY HER SON AND IS WANTING TO KNOW IF PCP ERICK WILL SEND OVER MEDICATION. SHE SAID HER SON HAD IT FOR 2 WEEKS AND IS STILL COUGHING         PLEASE ADVISE

## 2024-10-09 ENCOUNTER — TELEPHONE (OUTPATIENT)
Dept: INTERNAL MEDICINE | Facility: CLINIC | Age: 39
End: 2024-10-09
Payer: COMMERCIAL

## 2024-10-09 ENCOUNTER — HOSPITAL ENCOUNTER (EMERGENCY)
Facility: HOSPITAL | Age: 39
Discharge: HOME | End: 2024-10-09
Attending: EMERGENCY MEDICINE
Payer: COMMERCIAL

## 2024-10-09 ENCOUNTER — APPOINTMENT (OUTPATIENT)
Dept: GENERAL RADIOLOGY | Facility: HOSPITAL | Age: 39
End: 2024-10-09
Payer: COMMERCIAL

## 2024-10-09 VITALS
SYSTOLIC BLOOD PRESSURE: 110 MMHG | OXYGEN SATURATION: 99 % | WEIGHT: 143.74 LBS | RESPIRATION RATE: 16 BRPM | DIASTOLIC BLOOD PRESSURE: 69 MMHG | HEART RATE: 82 BPM | TEMPERATURE: 98.1 F | BODY MASS INDEX: 23.95 KG/M2 | HEIGHT: 65 IN

## 2024-10-09 DIAGNOSIS — J98.01 BRONCHOSPASM, ACUTE: ICD-10-CM

## 2024-10-09 DIAGNOSIS — R05.1 ACUTE COUGH: Primary | ICD-10-CM

## 2024-10-09 LAB
ALBUMIN SERPL-MCNC: 3.8 G/DL (ref 3.5–5.2)
ALBUMIN/GLOB SERPL: 1.2 G/DL
ALP SERPL-CCNC: 58 U/L (ref 39–117)
ALT SERPL W P-5'-P-CCNC: 11 U/L (ref 1–33)
ANION GAP SERPL CALCULATED.3IONS-SCNC: 11.9 MMOL/L (ref 5–15)
AST SERPL-CCNC: 13 U/L (ref 1–32)
BASOPHILS # BLD AUTO: 0.07 10*3/MM3 (ref 0–0.2)
BASOPHILS NFR BLD AUTO: 0.6 % (ref 0–1.5)
BILIRUB SERPL-MCNC: 1 MG/DL (ref 0–1.2)
BUN SERPL-MCNC: 7 MG/DL (ref 6–20)
BUN/CREAT SERPL: 8.3 (ref 7–25)
CALCIUM SPEC-SCNC: 9 MG/DL (ref 8.6–10.5)
CHLORIDE SERPL-SCNC: 105 MMOL/L (ref 98–107)
CO2 SERPL-SCNC: 22.1 MMOL/L (ref 22–29)
CREAT SERPL-MCNC: 0.84 MG/DL (ref 0.57–1)
DEPRECATED RDW RBC AUTO: 43.3 FL (ref 37–54)
EGFRCR SERPLBLD CKD-EPI 2021: 91.3 ML/MIN/1.73
EOSINOPHIL # BLD AUTO: 0.34 10*3/MM3 (ref 0–0.4)
EOSINOPHIL NFR BLD AUTO: 2.7 % (ref 0.3–6.2)
ERYTHROCYTE [DISTWIDTH] IN BLOOD BY AUTOMATED COUNT: 12.6 % (ref 12.3–15.4)
GLOBULIN UR ELPH-MCNC: 3.3 GM/DL
GLUCOSE SERPL-MCNC: 142 MG/DL (ref 65–99)
HCT VFR BLD AUTO: 38.9 % (ref 34–46.6)
HGB BLD-MCNC: 13 G/DL (ref 12–15.9)
HOLD SPECIMEN: NORMAL
HOLD SPECIMEN: NORMAL
IMM GRANULOCYTES # BLD AUTO: 0.04 10*3/MM3 (ref 0–0.05)
IMM GRANULOCYTES NFR BLD AUTO: 0.3 % (ref 0–0.5)
LYMPHOCYTES # BLD AUTO: 3.05 10*3/MM3 (ref 0.7–3.1)
LYMPHOCYTES NFR BLD AUTO: 24.2 % (ref 19.6–45.3)
MCH RBC QN AUTO: 31.5 PG (ref 26.6–33)
MCHC RBC AUTO-ENTMCNC: 33.4 G/DL (ref 31.5–35.7)
MCV RBC AUTO: 94.2 FL (ref 79–97)
MONOCYTES # BLD AUTO: 0.63 10*3/MM3 (ref 0.1–0.9)
MONOCYTES NFR BLD AUTO: 5 % (ref 5–12)
NEUTROPHILS NFR BLD AUTO: 67.2 % (ref 42.7–76)
NEUTROPHILS NFR BLD AUTO: 8.46 10*3/MM3 (ref 1.7–7)
NRBC BLD AUTO-RTO: 0 /100 WBC (ref 0–0.2)
NT-PROBNP SERPL-MCNC: 59.8 PG/ML (ref 0–450)
PLATELET # BLD AUTO: 243 10*3/MM3 (ref 140–450)
PMV BLD AUTO: 10.4 FL (ref 6–12)
POTASSIUM SERPL-SCNC: 3.9 MMOL/L (ref 3.5–5.2)
PROT SERPL-MCNC: 7.1 G/DL (ref 6–8.5)
QT INTERVAL: 388 MS
QTC INTERVAL: 411 MS
RBC # BLD AUTO: 4.13 10*6/MM3 (ref 3.77–5.28)
SODIUM SERPL-SCNC: 139 MMOL/L (ref 136–145)
TROPONIN T SERPL HS-MCNC: <6 NG/L
WBC NRBC COR # BLD AUTO: 12.59 10*3/MM3 (ref 3.4–10.8)
WHOLE BLOOD HOLD COAG: NORMAL
WHOLE BLOOD HOLD SPECIMEN: NORMAL

## 2024-10-09 PROCEDURE — 80053 COMPREHEN METABOLIC PANEL: CPT | Performed by: EMERGENCY MEDICINE

## 2024-10-09 PROCEDURE — 84484 ASSAY OF TROPONIN QUANT: CPT | Performed by: EMERGENCY MEDICINE

## 2024-10-09 PROCEDURE — 93005 ELECTROCARDIOGRAM TRACING: CPT | Performed by: EMERGENCY MEDICINE

## 2024-10-09 PROCEDURE — 93005 ELECTROCARDIOGRAM TRACING: CPT

## 2024-10-09 PROCEDURE — 83880 ASSAY OF NATRIURETIC PEPTIDE: CPT | Performed by: EMERGENCY MEDICINE

## 2024-10-09 PROCEDURE — 85025 COMPLETE CBC W/AUTO DIFF WBC: CPT | Performed by: EMERGENCY MEDICINE

## 2024-10-09 PROCEDURE — 99284 EMERGENCY DEPT VISIT MOD MDM: CPT

## 2024-10-09 PROCEDURE — 71045 X-RAY EXAM CHEST 1 VIEW: CPT

## 2024-10-09 RX ORDER — PREDNISONE 20 MG/1
TABLET ORAL
Qty: 15 TABLET | Refills: 0 | Status: SHIPPED | OUTPATIENT
Start: 2024-10-09

## 2024-10-09 RX ORDER — ALBUTEROL SULFATE 90 UG/1
2 INHALANT RESPIRATORY (INHALATION) EVERY 4 HOURS PRN
Qty: 1 G | Refills: 0 | Status: SHIPPED | OUTPATIENT
Start: 2024-10-09

## 2024-10-09 RX ORDER — SODIUM CHLORIDE 0.9 % (FLUSH) 0.9 %
10 SYRINGE (ML) INJECTION AS NEEDED
Status: DISCONTINUED | OUTPATIENT
Start: 2024-10-09 | End: 2024-10-09 | Stop reason: HOSPADM

## 2024-10-09 NOTE — TELEPHONE ENCOUNTER
Caller: Andreina Barnes    Relationship to patient: Self    Best call back number: [phone]    Chief complaint: CHEST TIGHTNESS, COUGHING UP A LOT OF BLOOD AND PHLEGM     Patient directed to call 911 or go to their nearest emergency room.     Patient verbalized understanding: [x] Yes  [] No  If no, why?    Additional notes: PATIENT WILL GO TO [facility].

## 2024-10-09 NOTE — DISCHARGE INSTRUCTIONS
Drink plenty of fluids.  Take Mucinex over-the-counter.  Take medications as directed.  Use inhaler as directed.  Return for worsening symptoms.  Follow-up your doctor in 1 to 2 days if no better.

## 2024-10-09 NOTE — ED PROVIDER NOTES
Time: 1:06 PM EDT  Date of encounter:  10/9/2024  Independent Historian/Clinical History and Information was obtained by:   Patient    History is limited by: N/A    Chief Complaint: Cough shortness of breath and chest discomfort      History of Present Illness:  Patient is a 39 y.o. year old female who presents to the emergency department for evaluation of cough shortness of breath and chest discomfort.  The patient presents to the emergency department complaining of cough shortness of breath and chest discomfort over the last week.  The patient recently had a diagnosis of pneumonia and states that she got better initially however she developed some increasing cough with some hemoptysis.  She has had no fever chills or vomiting and denies any current chest pain.  She states the chest pain is present when she coughs.      Patient Care Team  Primary Care Provider: Alyssa King MD    Past Medical History:     Allergies   Allergen Reactions    Amoxicillin Swelling and Rash    Morphine Hives    Penicillin G Swelling    Penicillin V Swelling    Nicoderm [Nicotine] Rash     Nicotine patches     Past Medical History:   Diagnosis Date    Anxiety     Dysthymia 09/05/2023    GERD (gastroesophageal reflux disease)     Hormone imbalance     Iron deficiency 09/05/2023    Mass of left breast     Migraine without aura, not refractory 09/05/2023    Pneumonia      Past Surgical History:   Procedure Laterality Date    APPENDECTOMY      BREAST LUMPECTOMY Left 9/20/2022    Procedure: Excision of left breast mass;  Surgeon: Savana Gaitan MD;  [place] [facility] [place] [facility] [place]  Service: General;  Laterality: Left;    COLONOSCOPY N/A 7/20/2023    Procedure: COLONOSCOPY WITH BIOPSIES;  Surgeon: Jelly Borrero MD;  Location: [facility] ENDOSCOPY;  Service: Gastroenterology;  Laterality: N/A;  DIVERTICULOSIS    ENDOSCOPY N/A 7/20/2023    Procedure: ESOPHAGOGASTRODUODENOSCOPY WITH BIOPSIES;  Surgeon: Jelly Borrero MD;   Location: [facility] ENDOSCOPY;  Service: Gastroenterology;  Laterality: N/A;  HIATAL HERNIA, GASTRITIS, ESOPHAGEAL NODULE    TONSILLECTOMY       Family History   Problem Relation Age of Onset    Liver cancer Mother 50        started in gallbladder, they removed that and it went to liver    Colon cancer Paternal Grandfather     Diabetes Maternal Grandmother     Diabetes Maternal Uncle     Malig Hyperthermia Neg Hx     Breast cancer Neg Hx     Ovarian cancer Neg Hx     Uterine cancer Neg Hx     Melanoma Neg Hx     Clotting disorder Neg Hx     Deep vein thrombosis Neg Hx     Pulmonary embolism Neg Hx        Home Medications:  Prior to Admission medications    Medication Sig Start Date End Date Taking? Authorizing Provider   albuterol sulfate  (90 Base) MCG/ACT inhaler Inhale 2 puffs Every 4 (Four) Hours As Needed for Wheezing or Shortness of Air. 1/24/24   Alyssa King MD   busPIRone (BUSPAR) 5 MG tablet Take 1 tablet by mouth 2 (Two) Times a Day. 1/24/24   Alyssa King MD   cholecalciferol (VITAMIN D3) 1.25 MG (80895 UT) capsule TAKE 1 CAPSULE BY MOUTH ONCE A WEEK AS DIRECTED for 84    Anthony Taylor MD   EPINEPHrine (EPIPEN) 0.3 MG/0.3ML solution auto-injector injection ADMINISTER 0.3 ML IN THE MUSCLE 1 TIME FOR 1 DOSE AS DIRECTED BY PRESCRIBER 6/19/24   Anthony Taylor MD   fluticasone (FLONASE) 50 MCG/ACT nasal spray 2 sprays into the nostril(s) as directed by provider Daily. 4/5/23   Alyssa King MD   pantoprazole (PROTONIX) 40 MG EC tablet TAKE 1 TABLET BY MOUTH DAILY 8/28/24   Alyssa King MD   spironolactone (ALDACTONE) 50 MG tablet TAKE 1 TABLET BY MOUTH DAILY 5/13/24   Alyssa King MD   venlafaxine XR (Effexor XR) 75 MG 24 hr capsule Take 1 capsule by mouth Daily. 3/20/24   Alyssa King MD   vitamin B-12 (CYANOCOBALAMIN) 1000 MCG tablet Take 1 tablet by mouth Daily. 10/24/23   Anthony Taylor MD   vitamin D (ERGOCALCIFEROL) 1.25 MG (00487 UT) capsule  "capsule TAKE 1 CAPSULE BY MOUTH 1 TIME EVERY WEEK FOR 12 DOSES 10/4/24   Alyssa King MD        Social History:   Social History     Tobacco Use    Smoking status: Some Days     Current packs/day: 0.25     Average packs/day: 0.3 packs/day for 20.0 years (5.0 ttl pk-yrs)     Types: Cigarettes    Smokeless tobacco: Never    Tobacco comments:     Down to 1 pack a week   Vaping Use    Vaping status: Every Day    Substances: Nicotine, NO VAPING 24 HR PRIOR TO SURGERY    Devices: Disposable   Substance Use Topics    Alcohol use: Yes     Comment: occasional    Drug use: Yes     Types: Marijuana         Review of Systems:  Review of Systems   Constitutional:  Negative for chills and fever.   HENT:  Negative for congestion, ear pain and sore throat.    Eyes:  Negative for pain.   Respiratory:  Positive for cough and shortness of breath. Negative for chest tightness.    Cardiovascular:  Positive for chest pain.   Gastrointestinal:  Negative for abdominal pain, diarrhea, nausea and vomiting.   Genitourinary:  Negative for flank pain and hematuria.   Musculoskeletal:  Negative for joint swelling.   Skin:  Negative for pallor.   Neurological:  Negative for seizures and headaches.   All other systems reviewed and are negative.       Physical Exam:  /69 (BP Location: Left arm, Patient Position: Sitting)   Pulse 82   Temp 98.1 °F (36.7 °C) (Oral)   Resp 16   Ht 165.1 cm (65\")   Wt 65.2 kg (143 lb 11.8 oz)   SpO2 99%   BMI 23.92 kg/m²     Physical Exam  Vitals and nursing note reviewed.   Constitutional:       General: She is not in acute distress.     Appearance: Normal appearance. She is not toxic-appearing.   HENT:      Head: Normocephalic and atraumatic.      Mouth/Throat:      Mouth: Mucous membranes are moist.   Eyes:      General: No scleral icterus.  Cardiovascular:      Rate and Rhythm: Normal rate and regular rhythm.      Pulses: Normal pulses.      Heart sounds: Normal heart sounds.   Pulmonary:      " Effort: Pulmonary effort is normal. No respiratory distress.      Breath sounds: Normal breath sounds.   Abdominal:      General: Abdomen is flat.      Palpations: Abdomen is soft.      Tenderness: There is no abdominal tenderness.   Musculoskeletal:         General: Normal range of motion.      Cervical back: Normal range of motion and neck supple.   Skin:     General: Skin is warm and dry.   Neurological:      Mental Status: She is alert and oriented to person, place, and time. Mental status is at baseline.                  Procedures:  Procedures      Medical Decision Making:      Comorbidities that affect care:    Recent diagnosis of pneumonia.    External Notes reviewed:    Previous Clinic Note: Office visit for reflux.      The following orders were placed and all results were independently analyzed by me:  Orders Placed This Encounter   Procedures    XR Chest 1 View    [facility] Draw    Comprehensive Metabolic Panel    BNP    Single High Sensitivity Troponin T    CBC Auto Differential    Undress & Gown    Continuous Pulse Oximetry    Vital Signs    ECG 12 Lead ED Triage Standing Order; SOA    CBC & Differential    Green Top (Gel)    Lavender Top    Gold Top - SST    Light Blue Top       Medications Given in the Emergency Department:  Medications - No data to display       ED Course:       EKG: Sinus rhythm rate of 67 bpm  No acute ischemia.    Labs:    Results for orders placed or performed during the hospital encounter of 10/09/24   Comprehensive Metabolic Panel    Specimen: Blood   Result Value Ref Range    Glucose 142 (H) 65 - 99 mg/dL    BUN 7 6 - 20 mg/dL    Creatinine 0.84 0.57 - 1.00 mg/dL    Sodium 139 136 - 145 mmol/L    Potassium 3.9 3.5 - 5.2 mmol/L    Chloride 105 98 - 107 mmol/L    CO2 22.1 22.0 - 29.0 mmol/L    Calcium 9.0 8.6 - 10.5 mg/dL    Total Protein 7.1 6.0 - 8.5 g/dL    Albumin 3.8 3.5 - 5.2 g/dL    ALT (SGPT) 11 1 - 33 U/L    AST (SGOT) 13 1 - 32 U/L    Alkaline Phosphatase 58 39 - 117  U/L    Total Bilirubin 1.0 0.0 - 1.2 mg/dL    Globulin 3.3 gm/dL    A/G Ratio 1.2 g/dL    BUN/Creatinine Ratio 8.3 7.0 - 25.0    Anion Gap 11.9 5.0 - 15.0 mmol/L    eGFR 91.3 >60.0 mL/min/1.73   BNP    Specimen: Blood   Result Value Ref Range    proBNP 59.8 0.0 - 450.0 pg/mL   Single High Sensitivity Troponin T    Specimen: Blood   Result Value Ref Range    HS Troponin T <6 <14 ng/L   CBC Auto Differential    Specimen: Blood   Result Value Ref Range    WBC 12.59 (H) 3.40 - 10.80 10*3/mm3    RBC 4.13 3.77 - 5.28 10*6/mm3    Hemoglobin 13.0 12.0 - 15.9 g/dL    Hematocrit 38.9 34.0 - 46.6 %    MCV 94.2 79.0 - 97.0 fL    MCH 31.5 26.6 - 33.0 pg    MCHC 33.4 31.5 - 35.7 g/dL    RDW 12.6 12.3 - 15.4 %    RDW-SD 43.3 37.0 - 54.0 fl    MPV 10.4 6.0 - 12.0 fL    Platelets 243 140 - 450 10*3/mm3    Neutrophil % 67.2 42.7 - 76.0 %    Lymphocyte % 24.2 19.6 - 45.3 %    Monocyte % 5.0 5.0 - 12.0 %    Eosinophil % 2.7 0.3 - 6.2 %    Basophil % 0.6 0.0 - 1.5 %    Immature Grans % 0.3 0.0 - 0.5 %    Neutrophils, Absolute 8.46 (H) 1.70 - 7.00 10*3/mm3    Lymphocytes, Absolute 3.05 0.70 - 3.10 10*3/mm3    Monocytes, Absolute 0.63 0.10 - 0.90 10*3/mm3    Eosinophils, Absolute 0.34 0.00 - 0.40 10*3/mm3    Basophils, Absolute 0.07 0.00 - 0.20 10*3/mm3    Immature Grans, Absolute 0.04 0.00 - 0.05 10*3/mm3    nRBC 0.0 0.0 - 0.2 /100 WBC   ECG 12 Lead ED Triage Standing Order; SOA   Result Value Ref Range    QT Interval 388 ms    QTC Interval 411 ms   Green Top (Gel)   Result Value Ref Range    Extra Tube Hold for add-ons.    Lavender Top   Result Value Ref Range    Extra Tube hold for add-on    Gold Top - SST   Result Value Ref Range    Extra Tube Hold for add-ons.    Light Blue Top   Result Value Ref Range    Extra Tube Hold for add-ons.          Lab Results (last 24 hours)       ** No results found for the last 24 hours. **             Imaging:    XR Chest 1 View    Result Date: 10/9/2024  Narrative: XR CHEST 1 VW Date of Exam:  10/9/2024 1:17 PM EDT Indication: SOA Triage Protocol Comparison: Chest radiograph dated 10/23/2023 Findings: The cardiomediastinal silhouette is within normal limits. Pulmonary vascularity appears normal. There is no focal airspace consolidation, pleural effusion, or pneumothorax. There are no acute osseous findings of the chest.     Impression: Impression: 1. No acute cardiopulmonary abnormality. Electronically Signed: Hai Roselia  10/9/2024 1:27 PM EDT  Workstation ID: [id number]       No Radiology Exams Resulted Within Past 24 Hours      Differential Diagnosis and Discussion:    Cough: Differential diagnosis includes but is not limited to pneumonia, acute bronchitis, upper respiratory infection, ACE inhibitor use, allergic reaction, epiglottitis, seasonal allergies, chemical irritants, exercise-induced asthma, viral syndrome.    All labs were reviewed and interpreted by me.  All X-rays impressions were independently interpreted by me.  EKG was interpreted by me.    MDM     Amount and/or Complexity of Data Reviewed  Clinical lab tests: reviewed  Tests in the radiology section of CPT®: reviewed  Tests in the medicine section of CPT®: reviewed                       Patient Care Considerations:    CT CHEST: I considered ordering a CT scan of the chest, however the patient has a pulse oximeter of 99 to 100% on room air and she is in no respiratory distress has no signs of pulmonary embolism.      Consultants/Shared Management Plan:    None    Social Determinants of Health:    Patient is independent, reliable, and has access to care.       Disposition and Care Coordination:    Discharged: I considered escalation of care by admitting this patient to the hospital, however the patient is currently stable and has no signs of emergent process.    I have explained discharge medications and the need for follow up with the patient/caretakers. This was also printed in the discharge instructions. Patient was discharged with  the following medications and follow up:      Medication List        New Prescriptions      predniSONE 20 MG tablet  Commonly known as: DELTASONE  Take 3 p.o. daily for 5 days.            Changed      * albuterol sulfate  (90 Base) MCG/ACT inhaler  Commonly known as: PROVENTIL HFA;VENTOLIN HFA;PROAIR HFA  Inhale 2 puffs Every 4 (Four) Hours As Needed for Wheezing or Shortness of Air.  What changed: Another medication with the same name was added. Make sure you understand how and when to take each.     * albuterol sulfate  (90 Base) MCG/ACT inhaler  Commonly known as: PROVENTIL HFA;VENTOLIN HFA;PROAIR HFA  Inhale 2 puffs Every 4 (Four) Hours As Needed for Wheezing.  What changed: You were already taking a medication with the same name, and this prescription was added. Make sure you understand how and when to take each.           * This list has 2 medication(s) that are the same as other medications prescribed for you. Read the directions carefully, and ask your doctor or other care provider to review them with you.                   Where to Get Your Medications        These medications were sent to [id number] - [place], [place] - [address] AT [facility] OF [address] & [place] - [phone] [facility] [phone]   [address], [facility] [address]      Phone: [phone]   albuterol sulfate  (90 Base) MCG/ACT inhaler  predniSONE 20 MG tablet      Alyssa King MD  [address]  [place] [address] [phone]    In 2 days         Final diagnoses:   Acute cough   Bronchospasm, acute        ED Disposition       ED Disposition   Discharge    Condition   Stable    Comment   --               This medical record created using voice recognition software.             Mtathew Onofre DO  10/10/24 1931

## 2024-10-10 ENCOUNTER — HOSPITAL ENCOUNTER (OUTPATIENT)
Dept: ULTRASOUND IMAGING | Facility: HOSPITAL | Age: 39
Discharge: HOME | End: 2024-10-10
Payer: COMMERCIAL

## 2024-10-12 DIAGNOSIS — F32.A DEPRESSION, UNSPECIFIED DEPRESSION TYPE: ICD-10-CM

## 2024-10-12 DIAGNOSIS — L68.0 HIRSUTISM: ICD-10-CM

## 2024-10-14 RX ORDER — LANOLIN ALCOHOL/MO/W.PET/CERES
CREAM (GRAM) TOPICAL DAILY
Qty: 90 TABLET | OUTPATIENT
Start: 2024-10-14

## 2024-10-14 RX ORDER — VENLAFAXINE HYDROCHLORIDE 37.5 MG/1
37.5 CAPSULE, EXTENDED RELEASE ORAL DAILY
Qty: 7 CAPSULE | Refills: 0 | OUTPATIENT
Start: 2024-10-14 | End: 2024-10-21

## 2024-10-14 RX ORDER — SPIRONOLACTONE 50 MG/1
50 TABLET, FILM COATED ORAL DAILY
Qty: 30 TABLET | Refills: 1 | Status: SHIPPED | OUTPATIENT
Start: 2024-10-14

## 2024-10-14 RX ORDER — METFORMIN HCL 500 MG
500 TABLET, EXTENDED RELEASE 24 HR ORAL
Qty: 30 TABLET | Refills: 1 | OUTPATIENT
Start: 2024-10-14

## 2024-10-21 RX ORDER — LANOLIN ALCOHOL/MO/W.PET/CERES
CREAM (GRAM) TOPICAL DAILY
Qty: 90 TABLET | OUTPATIENT
Start: 2024-10-21

## 2024-10-24 ENCOUNTER — HOSPITAL ENCOUNTER (OUTPATIENT)
Dept: ULTRASOUND IMAGING | Facility: HOSPITAL | Age: 39
Discharge: HOME | End: 2024-10-24
Payer: COMMERCIAL

## 2024-10-24 ENCOUNTER — HOSPITAL ENCOUNTER (OUTPATIENT)
Dept: MAMMOGRAPHY | Facility: HOSPITAL | Age: 39
Discharge: HOME | End: 2024-10-24
Payer: COMMERCIAL

## 2024-10-24 PROCEDURE — 77066 DX MAMMO INCL CAD BI: CPT

## 2024-10-24 PROCEDURE — 76642 ULTRASOUND BREAST LIMITED: CPT

## 2024-10-24 PROCEDURE — G0279 TOMOSYNTHESIS, MAMMO: HCPCS

## 2024-12-11 ENCOUNTER — APPOINTMENT (OUTPATIENT)
Dept: CT IMAGING | Facility: HOSPITAL | Age: 39
End: 2024-12-11
Payer: COMMERCIAL

## 2024-12-11 ENCOUNTER — HOSPITAL ENCOUNTER (EMERGENCY)
Facility: HOSPITAL | Age: 39
Discharge: HOME | End: 2024-12-11
Attending: EMERGENCY MEDICINE | Admitting: EMERGENCY MEDICINE
Payer: COMMERCIAL

## 2024-12-11 VITALS
WEIGHT: 151.01 LBS | RESPIRATION RATE: 20 BRPM | OXYGEN SATURATION: 100 % | TEMPERATURE: 98.3 F | SYSTOLIC BLOOD PRESSURE: 127 MMHG | HEIGHT: 64 IN | HEART RATE: 84 BPM | DIASTOLIC BLOOD PRESSURE: 70 MMHG | BODY MASS INDEX: 25.78 KG/M2

## 2024-12-11 DIAGNOSIS — R51.9 NONINTRACTABLE HEADACHE, UNSPECIFIED CHRONICITY PATTERN, UNSPECIFIED HEADACHE TYPE: Primary | ICD-10-CM

## 2024-12-11 PROCEDURE — 99284 EMERGENCY DEPT VISIT MOD MDM: CPT

## 2024-12-11 PROCEDURE — 70450 CT HEAD/BRAIN W/O DYE: CPT

## 2024-12-11 NOTE — ED PROVIDER NOTES
Time: 3:27 PM EST  Date of encounter:  12/11/2024  Independent Historian/Clinical History and Information was obtained by:   Patient    History is limited by: N/A    Chief Complaint: headache       History of Present Illness:  Patient is a 39 y.o. year old female who presents to the emergency department for evaluation of left-sided headache that began yesterday.  Patient admits to nausea but denies vomiting.  Patient denies head trauma.  Patient denies vision changes.      Patient Care Team  Primary Care Provider: Alyssa King MD    Past Medical History:     Allergies   Allergen Reactions    Amoxicillin Swelling and Rash    Morphine Hives    Nicoderm [Nicotine] Rash     Nicotine patches    Penicillin G Swelling    Penicillin V Swelling     Past Medical History:   Diagnosis Date    Anxiety     Dysthymia 09/05/2023    GERD (gastroesophageal reflux disease)     Hormone imbalance     Iron deficiency 09/05/2023    Mass of left breast     Migraine without aura, not refractory 09/05/2023    Pneumonia      Past Surgical History:   Procedure Laterality Date    APPENDECTOMY      BREAST LUMPECTOMY Left 9/20/2022    Procedure: Excision of left breast mass;  Surgeon: Savana Gaitan MD;  Location[place] [facility] [place] [facility] [place]  Service: General;  Laterality: Left;    COLONOSCOPY N/A 7/20/2023    Procedure: COLONOSCOPY WITH BIOPSIES;  Surgeon: Jelly Borrero MD;  Location: [facility] ENDOSCOPY;  Service: Gastroenterology;  Laterality: N/A;  DIVERTICULOSIS    ENDOSCOPY N/A 7/20/2023    Procedure: ESOPHAGOGASTRODUODENOSCOPY WITH BIOPSIES;  Surgeon: Jelly Borrero MD;  Location: [facility] ENDOSCOPY;  Service: Gastroenterology;  Laterality: N/A;  HIATAL HERNIA, GASTRITIS, ESOPHAGEAL NODULE    TONSILLECTOMY       Family History   Problem Relation Age of Onset    Liver cancer Mother 50        started in gallbladder, they removed that and it went to liver    Colon cancer Paternal Grandfather     Diabetes Maternal Grandmother      Diabetes Maternal Uncle     Malig Hyperthermia Neg Hx     Breast cancer Neg Hx     Ovarian cancer Neg Hx     Uterine cancer Neg Hx     Melanoma Neg Hx     Clotting disorder Neg Hx     Deep vein thrombosis Neg Hx     Pulmonary embolism Neg Hx        Home Medications:  Prior to Admission medications    Medication Sig Start Date End Date Taking? Authorizing Provider   spironolactone (ALDACTONE) 50 MG tablet TAKE 1 TABLET BY MOUTH DAILY 10/14/24   Alyssa King MD   albuterol sulfate  (90 Base) MCG/ACT inhaler Inhale 2 puffs Every 4 (Four) Hours As Needed for Wheezing or Shortness of Air. 1/24/24   Alyssa Knig MD   albuterol sulfate  (90 Base) MCG/ACT inhaler Inhale 2 puffs Every 4 (Four) Hours As Needed for Wheezing. 10/9/24   Matthew Onofre,    busPIRone (BUSPAR) 5 MG tablet Take 1 tablet by mouth 2 (Two) Times a Day. 1/24/24   Alyssa King MD   cholecalciferol (VITAMIN D3) 1.25 MG (85941 UT) capsule TAKE 1 CAPSULE BY MOUTH ONCE A WEEK AS DIRECTED for 84    Anthony Taylor MD   EPINEPHrine (EPIPEN) 0.3 MG/0.3ML solution auto-injector injection ADMINISTER 0.3 ML IN THE MUSCLE 1 TIME FOR 1 DOSE AS DIRECTED BY PRESCRIBER 6/19/24   Anthony Taylor MD   fluticasone (FLONASE) 50 MCG/ACT nasal spray 2 sprays into the nostril(s) as directed by provider Daily. 4/5/23   Alyssa King MD   pantoprazole (PROTONIX) 40 MG EC tablet TAKE 1 TABLET BY MOUTH DAILY 8/28/24   Alyssa King MD   venlafaxine XR (Effexor XR) 75 MG 24 hr capsule Take 1 capsule by mouth Daily. 3/20/24   Alyssa King MD   vitamin B-12 (CYANOCOBALAMIN) 1000 MCG tablet Take 1 tablet by mouth Daily. 10/24/23   Anthony Taylor MD   vitamin D (ERGOCALCIFEROL) 1.25 MG (14269 UT) capsule capsule TAKE 1 CAPSULE BY MOUTH 1 TIME EVERY WEEK FOR 12 DOSES 10/4/24   Alyssa King MD        Social History:   Social History     Tobacco Use    Smoking status: Some Days     Current packs/day: 0.25     Average  "packs/day: 0.3 packs/day for 20.0 years (5.0 ttl pk-yrs)     Types: Cigarettes    Smokeless tobacco: Never    Tobacco comments:     Down to 1 pack a week   Vaping Use    Vaping status: Every Day    Substances: Nicotine, NO VAPING 24 HR PRIOR TO SURGERY    Devices: Disposable   Substance Use Topics    Alcohol use: Yes     Comment: occasional    Drug use: Yes     Types: Marijuana         Review of Systems:  Review of Systems   Constitutional:  Negative for chills and fever.   HENT:  Negative for congestion, rhinorrhea and sore throat.    Eyes:  Negative for pain and visual disturbance.   Respiratory:  Negative for apnea, cough, chest tightness and shortness of breath.    Cardiovascular:  Negative for chest pain and palpitations.   Gastrointestinal:  Negative for abdominal pain, diarrhea, nausea and vomiting.   Genitourinary:  Negative for difficulty urinating and dysuria.   Musculoskeletal:  Negative for joint swelling and myalgias.   Skin:  Negative for color change.   Neurological:  Positive for headaches. Negative for seizures.   Psychiatric/Behavioral: Negative.     All other systems reviewed and are negative.       Physical Exam:  /70   Pulse 84   Temp 98.3 °F (36.8 °C)   Resp 20   Ht 162.6 cm (64\")   Wt 68.5 kg (151 lb 0.2 oz)   LMP 10/16/2024 (Approximate)   SpO2 100%   BMI 25.92 kg/m²     Physical Exam  Vitals and nursing note reviewed.   Constitutional:       General: She is not in acute distress.     Appearance: Normal appearance. She is not toxic-appearing.   HENT:      Head: Normocephalic and atraumatic.      Jaw: There is normal jaw occlusion.   Eyes:      General: Lids are normal.      Extraocular Movements: Extraocular movements intact.      Conjunctiva/sclera: Conjunctivae normal.      Pupils: Pupils are equal, round, and reactive to light.   Cardiovascular:      Rate and Rhythm: Normal rate and regular rhythm.      Pulses: Normal pulses.      Heart sounds: Normal heart sounds. "   Pulmonary:      Effort: Pulmonary effort is normal. No respiratory distress.      Breath sounds: Normal breath sounds. No wheezing or rhonchi.   Abdominal:      General: Abdomen is flat.      Palpations: Abdomen is soft.      Tenderness: There is no abdominal tenderness. There is no guarding or rebound.   Musculoskeletal:         General: Normal range of motion.      Cervical back: Normal range of motion and neck supple.      Right lower leg: No edema.      Left lower leg: No edema.   Skin:     General: Skin is warm and dry.   Neurological:      Mental Status: She is alert and oriented to person, place, and time. Mental status is at baseline.   Psychiatric:         Mood and Affect: Mood normal.                    Medical Decision Making:      Comorbidities that affect care:    None    External Notes reviewed:          The following orders were placed and all results were independently analyzed by me:  Orders Placed This Encounter   Procedures    CT Head Without Contrast       Medications Given in the Emergency Department:  Medications - No data to display     ED Course:         Labs:    Lab Results (last 24 hours)       ** No results found for the last 24 hours. **             Imaging:    CT Head Without Contrast    Result Date: 12/11/2024  CT HEAD WO CONTRAST Date of Exam: 12/11/2024 2:55 PM EST Indication: persistent sharp ha. Comparison: 9/24/2018 Technique: Axial CT images were obtained of the head without contrast administration.  Reconstructed coronal and sagittal images were also obtained. Automated exposure control and iterative construction methods were used. Findings: There is no evidence of acute territorial infarction. There is no acute intracranial hemorrhage. There are no extra-axial collections. Ventricles and CSF spaces are symmetric. No mass effect nor hydrocephalus. Brain parenchyma appears normal for age.  Paranasal sinuses and mastoid air cells are adequately aerated.  Osseous structures and  orbits appear intact.     Impression: No acute intracranial abnormality. Electronically Signed: Naz Smith MD  12/11/2024 3:14 PM EST  Workstation ID: [id number]       Differential Diagnosis and Discussion:    Headache: Differential diagnosis includes but is not limited to migraine, cluster headache, hypertension, tumor, subarachnoid bleeding, pseudotumor cerebri, temporal arteritis, infections, tension headache, and TMJ syndrome.        CT scan radiology impression was interpreted by me.    No orders to display       Procedures    MDM       CT head without contrast shows No acute intracranial abnormality.  Patient is alert and oriented answering all questions at this time.  I instructed patient to return to ED if she develops any new or worsening symptoms.  Patient states she understands and agrees with plan of care.              Patient Care Considerations:          Consultants/Shared Management Plan:    None    Social Determinants of Health:    Patient is independent, reliable, and has access to care.       Disposition and Care Coordination:    Discharged: The patient is suitable and stable for discharge with no need for consideration of admission.    I have explained the patient´s condition, diagnoses and treatment plan based on the information available to me at this time. I have answered questions and addressed any concerns. The patient has a good  understanding of the patient´s diagnosis, condition, and treatment plan as can be expected at this point. The vital signs have been stable. The patient´s condition is stable and appropriate for discharge from the emergency department.      The patient will pursue further outpatient evaluation with the primary care physician or other designated or consulting physician as outlined in the discharge instructions. They are agreeable to this plan of care and follow-up instructions have been explained in detail. The patient has received these instructions in written  format and has expressed an understanding of the discharge instructions. The patient is aware that any significant change in condition or worsening of symptoms should prompt an immediate return to this or the closest emergency department or call to 911.  I have explained discharge medications and the need for follow up with the patient/caretakers. This was also printed in the discharge instructions. Patient was discharged with the following medications and follow up:      Medication List      No changes were made to your prescriptions during this visit.      Alyssa King MD  [address] [place], [place] [id number] [phone]    Call in 1 day  To schedule follow-up       Final diagnoses:   Nonintractable headache, unspecified chronicity pattern, unspecified headache type        ED Disposition       ED Disposition   Discharge    Condition   Stable    Comment   --               This medical record created using voice recognition software.             Lorenzo Morocho PA-C  12/11/24 [id number]

## 2024-12-12 ENCOUNTER — CLINICAL SUPPORT (OUTPATIENT)
Dept: INTERNAL MEDICINE | Facility: CLINIC | Age: 39
End: 2024-12-12
Payer: COMMERCIAL

## 2024-12-12 DIAGNOSIS — R73.03 PREDIABETES: ICD-10-CM

## 2024-12-12 LAB
ALBUMIN SERPL-MCNC: 4.3 G/DL (ref 3.5–5.2)
ALBUMIN/GLOB SERPL: 1.7 G/DL
ALP SERPL-CCNC: 54 U/L (ref 39–117)
ALT SERPL W P-5'-P-CCNC: 23 U/L (ref 1–33)
ANION GAP SERPL CALCULATED.3IONS-SCNC: 13 MMOL/L (ref 5–15)
AST SERPL-CCNC: 22 U/L (ref 1–32)
BASOPHILS # BLD AUTO: 0.08 10*3/MM3 (ref 0–0.2)
BASOPHILS NFR BLD AUTO: 1.1 % (ref 0–1.5)
BILIRUB SERPL-MCNC: 1.3 MG/DL (ref 0–1.2)
BUN SERPL-MCNC: 10 MG/DL (ref 6–20)
BUN/CREAT SERPL: 10.9 (ref 7–25)
CALCIUM SPEC-SCNC: 9.4 MG/DL (ref 8.6–10.5)
CHLORIDE SERPL-SCNC: 101 MMOL/L (ref 98–107)
CHOLEST SERPL-MCNC: 178 MG/DL (ref 0–200)
CO2 SERPL-SCNC: 24 MMOL/L (ref 22–29)
CREAT SERPL-MCNC: 0.92 MG/DL (ref 0.57–1)
DEPRECATED RDW RBC AUTO: 40.8 FL (ref 37–54)
EGFRCR SERPLBLD CKD-EPI 2021: 81.9 ML/MIN/1.73
EOSINOPHIL # BLD AUTO: 0.35 10*3/MM3 (ref 0–0.4)
EOSINOPHIL NFR BLD AUTO: 4.6 % (ref 0.3–6.2)
ERYTHROCYTE [DISTWIDTH] IN BLOOD BY AUTOMATED COUNT: 11.9 % (ref 12.3–15.4)
GLOBULIN UR ELPH-MCNC: 2.6 GM/DL
GLUCOSE SERPL-MCNC: 207 MG/DL (ref 65–99)
HBA1C MFR BLD: 5.3 % (ref 4.8–5.6)
HCT VFR BLD AUTO: 39.8 % (ref 34–46.6)
HDLC SERPL-MCNC: 35 MG/DL (ref 40–60)
HGB BLD-MCNC: 13.8 G/DL (ref 12–15.9)
IMM GRANULOCYTES # BLD AUTO: 0.02 10*3/MM3 (ref 0–0.05)
IMM GRANULOCYTES NFR BLD AUTO: 0.3 % (ref 0–0.5)
LDLC SERPL CALC-MCNC: 120 MG/DL (ref 0–100)
LDLC/HDLC SERPL: 3.37 {RATIO}
LYMPHOCYTES # BLD AUTO: 2.88 10*3/MM3 (ref 0.7–3.1)
LYMPHOCYTES NFR BLD AUTO: 38 % (ref 19.6–45.3)
MCH RBC QN AUTO: 32.5 PG (ref 26.6–33)
MCHC RBC AUTO-ENTMCNC: 34.7 G/DL (ref 31.5–35.7)
MCV RBC AUTO: 93.9 FL (ref 79–97)
MONOCYTES # BLD AUTO: 0.59 10*3/MM3 (ref 0.1–0.9)
MONOCYTES NFR BLD AUTO: 7.8 % (ref 5–12)
NEUTROPHILS NFR BLD AUTO: 3.66 10*3/MM3 (ref 1.7–7)
NEUTROPHILS NFR BLD AUTO: 48.2 % (ref 42.7–76)
NRBC BLD AUTO-RTO: 0 /100 WBC (ref 0–0.2)
PLATELET # BLD AUTO: 256 10*3/MM3 (ref 140–450)
PMV BLD AUTO: 11.3 FL (ref 6–12)
POTASSIUM SERPL-SCNC: 4.2 MMOL/L (ref 3.5–5.2)
PROT SERPL-MCNC: 6.9 G/DL (ref 6–8.5)
RBC # BLD AUTO: 4.24 10*6/MM3 (ref 3.77–5.28)
SODIUM SERPL-SCNC: 138 MMOL/L (ref 136–145)
TRIGL SERPL-MCNC: 126 MG/DL (ref 0–150)
VLDLC SERPL-MCNC: 23 MG/DL (ref 5–40)
WBC NRBC COR # BLD AUTO: 7.58 10*3/MM3 (ref 3.4–10.8)

## 2024-12-12 PROCEDURE — 83036 HEMOGLOBIN GLYCOSYLATED A1C: CPT | Performed by: STUDENT IN AN ORGANIZED HEALTH CARE EDUCATION/TRAINING PROGRAM

## 2024-12-12 PROCEDURE — 80061 LIPID PANEL: CPT | Performed by: STUDENT IN AN ORGANIZED HEALTH CARE EDUCATION/TRAINING PROGRAM

## 2024-12-12 PROCEDURE — 80053 COMPREHEN METABOLIC PANEL: CPT | Performed by: STUDENT IN AN ORGANIZED HEALTH CARE EDUCATION/TRAINING PROGRAM

## 2024-12-12 PROCEDURE — 85025 COMPLETE CBC W/AUTO DIFF WBC: CPT | Performed by: STUDENT IN AN ORGANIZED HEALTH CARE EDUCATION/TRAINING PROGRAM

## 2024-12-12 PROCEDURE — 36415 COLL VENOUS BLD VENIPUNCTURE: CPT | Performed by: STUDENT IN AN ORGANIZED HEALTH CARE EDUCATION/TRAINING PROGRAM

## 2024-12-12 NOTE — PROGRESS NOTES
Venipuncture Blood Specimen Collection  Venipuncture performed in [facility] by Margie Johnson RN with good hemostasis. Patient tolerated the procedure well without complications.   12/12/24   Margie Johnson RN

## 2024-12-19 ENCOUNTER — OFFICE VISIT (OUTPATIENT)
Dept: INTERNAL MEDICINE | Facility: CLINIC | Age: 39
End: 2024-12-19
Payer: COMMERCIAL

## 2024-12-19 VITALS
HEART RATE: 86 BPM | TEMPERATURE: 97.4 F | DIASTOLIC BLOOD PRESSURE: 70 MMHG | RESPIRATION RATE: 16 BRPM | BODY MASS INDEX: 26.3 KG/M2 | OXYGEN SATURATION: 99 % | SYSTOLIC BLOOD PRESSURE: 118 MMHG | WEIGHT: 153.2 LBS

## 2024-12-19 DIAGNOSIS — R73.03 PREDIABETES: Primary | ICD-10-CM

## 2024-12-19 DIAGNOSIS — R09.81 NASAL CONGESTION: ICD-10-CM

## 2024-12-19 DIAGNOSIS — E53.8 VITAMIN B12 DEFICIENCY: ICD-10-CM

## 2024-12-19 DIAGNOSIS — J02.9 SORE THROAT: ICD-10-CM

## 2024-12-19 DIAGNOSIS — R06.00 PND (PAROXYSMAL NOCTURNAL DYSPNEA): ICD-10-CM

## 2024-12-19 DIAGNOSIS — E78.00 PURE HYPERCHOLESTEROLEMIA: ICD-10-CM

## 2024-12-19 DIAGNOSIS — Z76.0 MEDICATION REFILL: ICD-10-CM

## 2024-12-19 DIAGNOSIS — E55.9 VITAMIN D DEFICIENCY: ICD-10-CM

## 2024-12-19 LAB
EXPIRATION DATE: NORMAL
INTERNAL CONTROL: NORMAL
Lab: NORMAL
S PYO AG THROAT QL: NEGATIVE

## 2024-12-19 PROCEDURE — 99214 OFFICE O/P EST MOD 30 MIN: CPT | Performed by: STUDENT IN AN ORGANIZED HEALTH CARE EDUCATION/TRAINING PROGRAM

## 2024-12-19 PROCEDURE — 87880 STREP A ASSAY W/OPTIC: CPT | Performed by: STUDENT IN AN ORGANIZED HEALTH CARE EDUCATION/TRAINING PROGRAM

## 2024-12-19 RX ORDER — LANOLIN ALCOHOL/MO/W.PET/CERES
1000 CREAM (GRAM) TOPICAL DAILY
Qty: 90 TABLET | Refills: 1 | Status: SHIPPED | OUTPATIENT
Start: 2024-12-19

## 2024-12-19 RX ORDER — FLUTICASONE PROPIONATE 50 MCG
2 SPRAY, SUSPENSION (ML) NASAL DAILY
Qty: 16 G | Refills: 2 | Status: SHIPPED | OUTPATIENT
Start: 2024-12-19

## 2024-12-19 NOTE — ASSESSMENT & PLAN NOTE
Orders:    fluticasone (FLONASE) 50 MCG/ACT nasal spray; Administer 2 sprays into the nostril(s) as directed by provider Daily.

## 2024-12-19 NOTE — PROGRESS NOTES
Chief Complaint  Prediabetes (Follow up ), Sore Throat (Son went to  yesterday, has a viral throat infection. ), and Nasal Congestion    Subjective            Andreina Barnes presents to [facility]  History of Present Illness  Prediabetes:  Chronic, presenting for result review.     Sore throat and nasal congestion:  States she took her son to the doctor yesterday and he had testing for flu, covid and strep which was negative. Son with sore throat, nasal congestion and cough for 2d. Son without fever. He was dx with a viral infection.   Pt states she woke up with sore throat, nasal congestion and cough. Cough is intermittently productive of yellow sputum. No SOA.         Past Medical History:   Diagnosis Date    Anxiety     Dysthymia 09/05/2023    GERD (gastroesophageal reflux disease)     Hormone imbalance     Iron deficiency 09/05/2023    Mass of left breast     Migraine without aura, not refractory 09/05/2023    Pneumonia        Allergies:   Allergies   Allergen Reactions    Amoxicillin Swelling and Rash    Morphine Hives    Nicoderm [Nicotine] Rash     Nicotine patches    Penicillin G Swelling    Penicillin V Swelling          Past Surgical History:   Procedure Laterality Date    APPENDECTOMY      BREAST LUMPECTOMY Left 9/20/2022    Procedure: Excision of left breast mass;  Surgeon: Savana Gaitan MD;  [facility]: [facility] [place];  Service: General;  Laterality: Left;    COLONOSCOPY N/A 7/20/2023    Procedure: COLONOSCOPY WITH BIOPSIES;  Surgeon: Jelly Borrero MD;  Location: [facility] ENDOSCOPY;  Service: Gastroenterology;  Laterality: N/A;  DIVERTICULOSIS    ENDOSCOPY N/A 7/20/2023    Procedure: ESOPHAGOGASTRODUODENOSCOPY WITH BIOPSIES;  Surgeon: Jelly Borrero MD;  Location: [facility] ENDOSCOPY;  Service: Gastroenterology;  Laterality: N/A;  HIATAL HERNIA, GASTRITIS, ESOPHAGEAL NODULE    TONSILLECTOMY            Social History      Socioeconomic History    Marital status:    Tobacco Use    Smoking status: Some Days     Current packs/day: 0.25     Average packs/day: 0.3 packs/day for 20.0 years (5.0 ttl pk-yrs)     Types: Cigarettes    Smokeless tobacco: Never    Tobacco comments:     Down to 1 pack a week   Vaping Use    Vaping status: Former   Substance and Sexual Activity    Alcohol use: Yes     Comment: rarely    Drug use: Yes     Types: Marijuana    Sexual activity: Yes     Partners: Male     Birth control/protection: Vasectomy         Family History   Problem Relation Age of Onset    Liver cancer Mother 50        started in gallbladder, they removed that and it went to liver    Colon cancer Paternal Grandfather     Diabetes Maternal Grandmother     Diabetes Maternal Uncle     Malig Hyperthermia Neg Hx     Breast cancer Neg Hx     Ovarian cancer Neg Hx     Uterine cancer Neg Hx     Melanoma Neg Hx     Clotting disorder Neg Hx     Deep vein thrombosis Neg Hx     Pulmonary embolism Neg Hx           Health Maintenance Due   Topic Date Due    Pneumococcal Vaccine 0-64 (1 of 2 - PCV) Never done    INFLUENZA VACCINE  Never done    COVID-19 Vaccine (1 - 2024-25 season) Never done            Current Outpatient Medications:     albuterol sulfate  (90 Base) MCG/ACT inhaler, Inhale 2 puffs Every 4 (Four) Hours As Needed for Wheezing., Disp: 1 g, Rfl: 0    busPIRone (BUSPAR) 5 MG tablet, Take 1 tablet by mouth 2 (Two) Times a Day. (Patient taking differently: Take 1 tablet by mouth As Needed.), Disp: 60 tablet, Rfl: 1    cholecalciferol (VITAMIN D3) 1.25 MG (97428 UT) capsule, TAKE 1 CAPSULE BY MOUTH ONCE A WEEK AS DIRECTED for 84, Disp: , Rfl:     EPINEPHrine (EPIPEN) 0.3 MG/0.3ML solution auto-injector injection, ADMINISTER 0.3 ML IN THE MUSCLE 1 TIME FOR 1 DOSE AS DIRECTED BY PRESCRIBER, Disp: , Rfl:     fluticasone (FLONASE) 50 MCG/ACT nasal spray, Administer 2 sprays into the nostril(s) as directed by provider Daily., Disp: 16  g, Rfl: 2    pantoprazole (PROTONIX) 40 MG EC tablet, TAKE 1 TABLET BY MOUTH DAILY, Disp: 30 tablet, Rfl: 1    spironolactone (ALDACTONE) 50 MG tablet, TAKE 1 TABLET BY MOUTH DAILY, Disp: 30 tablet, Rfl: 1    venlafaxine XR (Effexor XR) 75 MG 24 hr capsule, Take 1 capsule by mouth Daily., Disp: 90 capsule, Rfl: 1    vitamin B-12 (CYANOCOBALAMIN) 1000 MCG tablet, Take 1 tablet by mouth Daily., Disp: 90 tablet, Rfl: 1    vitamin D (ERGOCALCIFEROL) 1.25 MG (03254 UT) capsule capsule, TAKE 1 CAPSULE BY MOUTH 1 TIME EVERY WEEK FOR 12 DOSES, Disp: 12 capsule, Rfl: 1    brompheniramine-pseudoephedrine-DM 30-2-10 MG/5ML syrup, Take 10 mL by mouth Every 8 (Eight) Hours As Needed for Allergies., Disp: 200 mL, Rfl: 0      Immunization History   Administered Date(s) Administered    Tdap 01/20/2022         Review of Systems       Objective       Vitals:    12/19/24 0833   BP: 118/70   BP Location: Right arm   Patient Position: Sitting   Cuff Size: Adult   Pulse: 86   Resp: 16   Temp: 97.4 °F (36.3 °C)   TempSrc: Temporal   SpO2: 99%   Weight: 69.5 kg (153 lb 3.2 oz)       Physical Exam  Vitals reviewed.   Constitutional:       Appearance: Normal appearance.   HENT:      Head: Normocephalic and atraumatic.      Nose: Nose normal.      Mouth/Throat:      Mouth: Mucous membranes are moist.      Pharynx: Posterior oropharyngeal erythema present. No oropharyngeal exudate.   Eyes:      Extraocular Movements: Extraocular movements intact.      Conjunctiva/sclera: Conjunctivae normal.   Cardiovascular:      Rate and Rhythm: Normal rate and regular rhythm.      Pulses: Normal pulses.      Heart sounds: Normal heart sounds.   Pulmonary:      Effort: Pulmonary effort is normal. No respiratory distress.      Breath sounds: Normal breath sounds.   Musculoskeletal:         General: Normal range of motion.   Skin:     General: Skin is warm and dry.   Neurological:      General: No focal deficit present.      Mental Status: She is alert and  oriented to person, place, and time.      Cranial Nerves: No cranial nerve deficit.   Psychiatric:         Mood and Affect: Mood normal.         Behavior: Behavior normal.         Thought Content: Thought content normal.         Result Review :     The following data was reviewed by: Alyssa King MD on 12/19/2024:    Common labs          9/13/2024    13:38 10/9/2024    13:03 12/12/2024    08:07   Common Labs   Glucose 101  142  207    BUN 8  7  10    Creatinine 1.04  0.84  0.92    Sodium 136  139  138    Potassium 4.0  3.9  4.2    Chloride 101  105  101    Calcium 10.3  9.0  9.4    Albumin 4.7  3.8  4.3    Total Bilirubin 0.8  1.0  1.3    Alkaline Phosphatase 62  58  54    AST (SGOT) 17  13  22    ALT (SGPT) 13  11  23    WBC 9.97  12.59  7.58    Hemoglobin 14.1  13.0  13.8    Hematocrit 42.4  38.9  39.8    Platelets 292  243  256    Total Cholesterol 194   178    Triglycerides 254   126    HDL Cholesterol 28   35    LDL Cholesterol  121   120    Hemoglobin A1C 5.40   5.30      TSH          2/1/2024    08:18 6/12/2024    11:22 9/13/2024    13:38   TSH   TSH 0.790  0.894  0.714                      Assessment and Plan      Diagnoses and all orders for this visit:    1. Prediabetes (Primary)  Comments:  Chronic, in good control w/ normalization of A1c. Labs ordered to be done prior to next f/u visit.  Orders:  -     CBC & Differential; Future  -     Comprehensive Metabolic Panel; Future  -     Hemoglobin A1c; Future  -     Lipid Panel; Future  -     TSH; Future  -     T4, Free; Future  -     Vitamin B12; Future  -     Vitamin D 25 hydroxy; Future    2. Sore throat  Comments:  acute, likekly due to PND. POCT for strep is negative.  Orders:  -     POC Rapid Strep A    3. PND (paroxysmal nocturnal dyspnea)  Comments:  acute, recommend flonase.    4. Pure hypercholesterolemia  Comments:  Chronic, persisting.Emphasized need for pt to work on dietary and lifestyle changes w/ plan to repeat labs in 3 mos and start statin  tx if LDL remains >100  Assessment & Plan:                5. Nasal congestion  Comments:  Subacute to chronic, most likely cause of difficulty swallowing as reported in HPI, since this only occurs w/ her saliva. Recommend daily zytec and flonase.   Assessment & Plan:    Orders:    fluticasone (FLONASE) 50 MCG/ACT nasal spray; Administer 2 sprays into the nostril(s) as directed by provider Daily.      Orders:  -     fluticasone (FLONASE) 50 MCG/ACT nasal spray; Administer 2 sprays into the nostril(s) as directed by provider Daily.  Dispense: 16 g; Refill: 2    6. Medication refill  -     vitamin B-12 (CYANOCOBALAMIN) 1000 MCG tablet; Take 1 tablet by mouth Daily.  Dispense: 90 tablet; Refill: 1    7. Vitamin D deficiency  Assessment & Plan:    Orders:    Vitamin D 25 hydroxy; Future      Orders:  -     Vitamin D 25 hydroxy; Future    8. Vitamin B12 deficiency  -     Vitamin B12; Future                      Follow Up     Return in about 3 months (around 3/19/2025) for prediabetes, HLD .    Patient was given instructions and counseling regarding her condition or for health maintenance advice. Please see specific information pulled into the AVS if appropriate.     Alyssa King MD   Internal Medicine-Pediatrics

## 2024-12-20 ENCOUNTER — TELEPHONE (OUTPATIENT)
Dept: INTERNAL MEDICINE | Facility: CLINIC | Age: 39
End: 2024-12-20
Payer: COMMERCIAL

## 2024-12-20 NOTE — TELEPHONE ENCOUNTER
Caller: Andreina Barnes    Relationship: Self    Best call back number: [phone]    What was the call regarding: PATIENT IS CHECKING ON THIS.

## 2024-12-20 NOTE — TELEPHONE ENCOUNTER
Caller: Andreina Barnes    Relationship: Self    Best call back number: [phone]     What medication are you requesting: SOMETHING FOR CURRENT SYMPTOMS    What are your current symptoms: BODY ACHES, CHILLS, FEELING AWFUL, COUGHING, CONGESTION.    How long have you been experiencing symptoms: LESS THAN 24 HOURS    Have you had these symptoms before:    [x] Yes  [] No    Have you been treated for these symptoms before:   [x] Yes  [] No    If a prescription is needed, what is your preferred pharmacy and phone number: [id number] - [place], [place] - [address] [facility] AT [facility] OF [address] & [place] - [phone]  - [phone] FX     Additional notes: PATIENT WAS SEEN IN OFFICE ON 12.19.2024. PATIENT WAS PRESCRIBED FLONASE FOR SYMPTOMS. PATIENT STATES THAT SINCE HER APPOINTMENT, SHE HAS DEVELOPED NEW SYMPTOMS AND FEELS WORSE. PATIENT REQUESTING MEDICATION BE CALLED IN. PLEASE ADVISE.

## 2025-03-12 ENCOUNTER — CLINICAL SUPPORT (OUTPATIENT)
Dept: INTERNAL MEDICINE | Facility: CLINIC | Age: 40
End: 2025-03-12
Payer: COMMERCIAL

## 2025-03-12 DIAGNOSIS — E55.9 VITAMIN D DEFICIENCY: ICD-10-CM

## 2025-03-12 DIAGNOSIS — R73.03 PREDIABETES: ICD-10-CM

## 2025-03-12 DIAGNOSIS — E53.8 VITAMIN B12 DEFICIENCY: ICD-10-CM

## 2025-03-12 LAB
25(OH)D3 SERPL-MCNC: 32.6 NG/ML (ref 30–100)
ALBUMIN SERPL-MCNC: 4 G/DL (ref 3.5–5.2)
ALBUMIN/GLOB SERPL: 1.3 G/DL
ALP SERPL-CCNC: 55 U/L (ref 39–117)
ALT SERPL W P-5'-P-CCNC: 17 U/L (ref 1–33)
ANION GAP SERPL CALCULATED.3IONS-SCNC: 14.8 MMOL/L (ref 5–15)
AST SERPL-CCNC: 21 U/L (ref 1–32)
BASOPHILS # BLD AUTO: 0.08 10*3/MM3 (ref 0–0.2)
BASOPHILS NFR BLD AUTO: 1.1 % (ref 0–1.5)
BILIRUB SERPL-MCNC: 1 MG/DL (ref 0–1.2)
BUN SERPL-MCNC: 10 MG/DL (ref 6–20)
BUN/CREAT SERPL: 10.5 (ref 7–25)
CALCIUM SPEC-SCNC: 9.6 MG/DL (ref 8.6–10.5)
CHLORIDE SERPL-SCNC: 104 MMOL/L (ref 98–107)
CHOLEST SERPL-MCNC: 166 MG/DL (ref 0–200)
CO2 SERPL-SCNC: 21.2 MMOL/L (ref 22–29)
CREAT SERPL-MCNC: 0.95 MG/DL (ref 0.57–1)
DEPRECATED RDW RBC AUTO: 40.5 FL (ref 37–54)
EGFRCR SERPLBLD CKD-EPI 2021: 78.8 ML/MIN/1.73
EOSINOPHIL # BLD AUTO: 0.34 10*3/MM3 (ref 0–0.4)
EOSINOPHIL NFR BLD AUTO: 4.8 % (ref 0.3–6.2)
ERYTHROCYTE [DISTWIDTH] IN BLOOD BY AUTOMATED COUNT: 11.8 % (ref 12.3–15.4)
GLOBULIN UR ELPH-MCNC: 3 GM/DL
GLUCOSE SERPL-MCNC: 100 MG/DL (ref 65–99)
HBA1C MFR BLD: 5.8 % (ref 4.8–5.6)
HCT VFR BLD AUTO: 41.1 % (ref 34–46.6)
HDLC SERPL-MCNC: 36 MG/DL (ref 40–60)
HGB BLD-MCNC: 13.9 G/DL (ref 12–15.9)
IMM GRANULOCYTES # BLD AUTO: 0.02 10*3/MM3 (ref 0–0.05)
IMM GRANULOCYTES NFR BLD AUTO: 0.3 % (ref 0–0.5)
LDLC SERPL CALC-MCNC: 111 MG/DL (ref 0–100)
LDLC/HDLC SERPL: 3.03 {RATIO}
LYMPHOCYTES # BLD AUTO: 2.79 10*3/MM3 (ref 0.7–3.1)
LYMPHOCYTES NFR BLD AUTO: 39.5 % (ref 19.6–45.3)
MCH RBC QN AUTO: 31.5 PG (ref 26.6–33)
MCHC RBC AUTO-ENTMCNC: 33.8 G/DL (ref 31.5–35.7)
MCV RBC AUTO: 93.2 FL (ref 79–97)
MONOCYTES # BLD AUTO: 0.69 10*3/MM3 (ref 0.1–0.9)
MONOCYTES NFR BLD AUTO: 9.8 % (ref 5–12)
NEUTROPHILS NFR BLD AUTO: 3.14 10*3/MM3 (ref 1.7–7)
NEUTROPHILS NFR BLD AUTO: 44.5 % (ref 42.7–76)
NRBC BLD AUTO-RTO: 0 /100 WBC (ref 0–0.2)
PLATELET # BLD AUTO: 261 10*3/MM3 (ref 140–450)
PMV BLD AUTO: 11.4 FL (ref 6–12)
POTASSIUM SERPL-SCNC: 4.5 MMOL/L (ref 3.5–5.2)
PROT SERPL-MCNC: 7 G/DL (ref 6–8.5)
RBC # BLD AUTO: 4.41 10*6/MM3 (ref 3.77–5.28)
SODIUM SERPL-SCNC: 140 MMOL/L (ref 136–145)
T4 FREE SERPL-MCNC: 1.33 NG/DL (ref 0.92–1.68)
TRIGL SERPL-MCNC: 104 MG/DL (ref 0–150)
TSH SERPL DL<=0.05 MIU/L-ACNC: 1.14 UIU/ML (ref 0.27–4.2)
VIT B12 BLD-MCNC: 516 PG/ML (ref 211–946)
VLDLC SERPL-MCNC: 19 MG/DL (ref 5–40)
WBC NRBC COR # BLD AUTO: 7.06 10*3/MM3 (ref 3.4–10.8)

## 2025-03-12 PROCEDURE — 84439 ASSAY OF FREE THYROXINE: CPT | Performed by: STUDENT IN AN ORGANIZED HEALTH CARE EDUCATION/TRAINING PROGRAM

## 2025-03-12 PROCEDURE — 80050 GENERAL HEALTH PANEL: CPT | Performed by: STUDENT IN AN ORGANIZED HEALTH CARE EDUCATION/TRAINING PROGRAM

## 2025-03-12 PROCEDURE — 36415 COLL VENOUS BLD VENIPUNCTURE: CPT | Performed by: STUDENT IN AN ORGANIZED HEALTH CARE EDUCATION/TRAINING PROGRAM

## 2025-03-12 PROCEDURE — 82306 VITAMIN D 25 HYDROXY: CPT | Performed by: STUDENT IN AN ORGANIZED HEALTH CARE EDUCATION/TRAINING PROGRAM

## 2025-03-12 PROCEDURE — 82607 VITAMIN B-12: CPT | Performed by: STUDENT IN AN ORGANIZED HEALTH CARE EDUCATION/TRAINING PROGRAM

## 2025-03-12 PROCEDURE — 83036 HEMOGLOBIN GLYCOSYLATED A1C: CPT | Performed by: STUDENT IN AN ORGANIZED HEALTH CARE EDUCATION/TRAINING PROGRAM

## 2025-03-12 PROCEDURE — 80061 LIPID PANEL: CPT | Performed by: STUDENT IN AN ORGANIZED HEALTH CARE EDUCATION/TRAINING PROGRAM

## 2025-03-12 NOTE — PROGRESS NOTES
Venipuncture Blood Specimen Collection  Venipuncture performed in [facility] by Margie Johnson RN with good hemostasis. Patient tolerated the procedure well without complications.   03/12/25   Margie Johnson RN

## 2025-03-17 ENCOUNTER — HOSPITAL ENCOUNTER (EMERGENCY)
Facility: HOSPITAL | Age: 40
Discharge: HOME | End: 2025-03-17
Attending: EMERGENCY MEDICINE | Admitting: EMERGENCY MEDICINE
Payer: COMMERCIAL

## 2025-03-17 VITALS
SYSTOLIC BLOOD PRESSURE: 130 MMHG | WEIGHT: 150 LBS | OXYGEN SATURATION: 99 % | RESPIRATION RATE: 18 BRPM | DIASTOLIC BLOOD PRESSURE: 73 MMHG | TEMPERATURE: 99 F | HEART RATE: 98 BPM | BODY MASS INDEX: 24.99 KG/M2 | HEIGHT: 65 IN

## 2025-03-17 DIAGNOSIS — M72.2 PLANTAR FASCIITIS: Primary | ICD-10-CM

## 2025-03-17 PROCEDURE — 99283 EMERGENCY DEPT VISIT LOW MDM: CPT

## 2025-03-17 RX ORDER — METHYLPREDNISOLONE 4 MG/1
TABLET ORAL
Qty: 21 TABLET | Refills: 0 | Status: SHIPPED | OUTPATIENT
Start: 2025-03-17 | End: 2025-03-23

## 2025-03-17 RX ORDER — IBUPROFEN 600 MG/1
600 TABLET, FILM COATED ORAL EVERY 6 HOURS PRN
Qty: 20 TABLET | Refills: 0 | Status: SHIPPED | OUTPATIENT
Start: 2025-03-17

## 2025-03-17 RX ADMIN — IBUPROFEN 600 MG: 200 TABLET, FILM COATED ORAL at 12:47

## 2025-03-17 NOTE — DISCHARGE INSTRUCTIONS
Your pain is likely related to a condition called plantar fasciitis.  I have prescribed you anti-inflammatories as well as a low-dose steroids.  I have also given you information about exercises to help with the discomfort.  You can also use the tennis ball and soup can maneuver that I showed you why you were here in the emergency department.  I do think it is worth trying to purchase some of the plantar fasciitis insoles and try and your shoes.  Can be purchased at your local pharmacy or department store such as [employer].  In case here discomfort continues to worsen I have provided you contact information for the foot specialist here in [place].  Please schedule an appointment with them if you continue to have pain and discomfort.  If it anytime you feel that your foot is becoming red in nature or warm to the touch or if you lose feeling in your foot please return to the ER.  Otherwise follow-up with your primary care provider or the podiatrist

## 2025-03-17 NOTE — Clinical Note
[facility] EMERGENCY ROOM  [address] [place] [address]  Phone: [phone]  Fax: [phone]    Andreina Barnes was seen and treated in our emergency department on 3/17/2025.  She may return to work on 03/19/2025.         Thank you for choosing [facility].    Micki Jaramillo APRN

## 2025-03-17 NOTE — ED PROVIDER NOTES
Time: 12:14 PM EDT  Date of encounter:  3/17/2025  Room number: 65/65  Independent Historian/Clinical History and Information was obtained by:   Patient    History is limited by: N/A    Chief Complaint: foot pain       History of Present Illness:  Patient is a 39 y.o. year old female who presents to the emergency department for evaluation of foot pain.  Patient states she started having some discomfort on the sole of her left foot in her arch area and just below the heel of her foot approximately 3 to 4 weeks ago.  It has progressively gotten worse.  Pain is improved with rest.  However she states she walks a lot at work and by the end of her shift she can barely put weight on her foot.    HPI    Patient Care Team  Primary Care Provider: Alyssa King MD    Past Medical History:     Allergies   Allergen Reactions    Amoxicillin Swelling and Rash    Morphine Hives    Nicoderm [Nicotine] Rash     Nicotine patches    Penicillin G Swelling    Penicillin V Swelling     Past Medical History:   Diagnosis Date    Anxiety     Dysthymia 09/05/2023    GERD (gastroesophageal reflux disease)     Hormone imbalance     Iron deficiency 09/05/2023    Mass of left breast     Migraine without aura, not refractory 09/05/2023    Pneumonia      Past Surgical History:   Procedure Laterality Date    APPENDECTOMY      BREAST LUMPECTOMY Left 9/20/2022    Procedure: Excision of left breast mass;  Surgeon: Savana Gaitan MD;  [facility]: [facility];  Service: General;  Laterality: Left;    COLONOSCOPY N/A 7/20/2023    Procedure: COLONOSCOPY WITH BIOPSIES;  Surgeon: Jelly Borrero MD;  Location: [facility] ENDOSCOPY;  Service: Gastroenterology;  Laterality: N/A;  DIVERTICULOSIS    ENDOSCOPY N/A 7/20/2023    Procedure: ESOPHAGOGASTRODUODENOSCOPY WITH BIOPSIES;  Surgeon: Jelly Borrero MD;  Location: [facility] ENDOSCOPY;  Service: Gastroenterology;  Laterality: N/A;  HIATAL HERNIA, GASTRITIS, ESOPHAGEAL NODULE     TONSILLECTOMY       Family History   Problem Relation Age of Onset    Liver cancer Mother 50        started in gallbladder, they removed that and it went to liver    Colon cancer Paternal Grandfather     Diabetes Maternal Grandmother     Diabetes Maternal Uncle     Malig Hyperthermia Neg Hx     Breast cancer Neg Hx     Ovarian cancer Neg Hx     Uterine cancer Neg Hx     Melanoma Neg Hx     Clotting disorder Neg Hx     Deep vein thrombosis Neg Hx     Pulmonary embolism Neg Hx        Home Medications:  Prior to Admission medications    Medication Sig Start Date End Date Taking? Authorizing Provider   albuterol sulfate  (90 Base) MCG/ACT inhaler Inhale 2 puffs Every 4 (Four) Hours As Needed for Wheezing. 10/9/24   Matthew Onofre DO   cholecalciferol (VITAMIN D3) 1.25 MG (54944 UT) capsule TAKE 1 CAPSULE BY MOUTH ONCE A WEEK AS DIRECTED for 84    Anthony Taylor MD   EPINEPHrine (EPIPEN) 0.3 MG/0.3ML solution auto-injector injection ADMINISTER 0.3 ML IN THE MUSCLE 1 TIME FOR 1 DOSE AS DIRECTED BY PRESCRIBER 6/19/24   Anthony Taylor MD   fluticasone (FLONASE) 50 MCG/ACT nasal spray Administer 2 sprays into the nostril(s) as directed by provider Daily. 12/19/24   Alyssa King MD   pantoprazole (PROTONIX) 40 MG EC tablet TAKE 1 TABLET BY MOUTH DAILY 8/28/24   Alyssa King MD   vitamin B-12 (CYANOCOBALAMIN) 1000 MCG tablet Take 1 tablet by mouth Daily. 12/19/24   Alyssa King MD   vitamin D (ERGOCALCIFEROL) 1.25 MG (00721 UT) capsule capsule TAKE 1 CAPSULE BY MOUTH 1 TIME EVERY WEEK FOR 12 DOSES 10/4/24   Alyssa King MD        Social History:   Social History     Tobacco Use    Smoking status: Every Day     Current packs/day: 0.25     Average packs/day: 0.3 packs/day for 20.0 years (5.0 ttl pk-yrs)     Types: Cigarettes    Smokeless tobacco: Never    Tobacco comments:     Down to 1 pack a week   Vaping Use    Vaping status: Former   Substance Use Topics    Alcohol use: Yes      "Comment: rarely    Drug use: Yes     Types: Marijuana         Review of Systems:  Review of Systems   I performed a review of systems today, which was all negative, except for the positives found in the HPI above.  Physical Exam:  /73 (BP Location: Right arm, Patient Position: Sitting)   Pulse 98   Temp 99 °F (37.2 °C) (Oral)   Resp 18   Ht 165.1 cm (65\")   Wt 68 kg (150 lb)   SpO2 99%   BMI 24.96 kg/m²     Physical Exam  Musculoskeletal:        Feet:         General:  Awake alert no apparent distress    Head: Normocephalic, atraumatic, eyes PERRLA EOMI, nose normal, oropharynx normal    Neck: Supple, no meningismus, no cervical lymphadenopathy or JVD    Heart: Regular rate and rhythm, no murmurs or rubs, 2+ radial pulses    Lungs: Clear to auscultation bilaterally, no wheezing or rhonchi or rales, no respiratory distress    Abdomen: Soft, nontender, nondistended, no signs of peritonitis    Skin: Warm, dry, no rash    Musculoskeletal: Normal range of motion, no obvious deformities, no edema noted.    Neurologic: Awake, alert, oriented x 3, no motor or sensory deficits appreciated    Psych: No suicidal or homicidal ideations, no psychosis              Procedures:  Procedures      Medical Decision Making:      Comorbidities that affect care:        External Notes reviewed:          The following orders were placed and all results were independently analyzed by me:  No orders of the defined types were placed in this encounter.      Medications Given in the Emergency Department:  Medications   ibuprofen (ADVIL,MOTRIN) tablet 600 mg (600 mg Oral Given 3/17/25 1247)        ED Course:    ED Course as of 03/17/25 1259   Mon Mar 17, 2025   1246 Patient does report that she is starting to notice some of the same pain, only much less severe, starting in her right foot.  We discussed plantar fasciitis and how she may be compensating on her right foot when her left starts to hurt her so bad.  We also discussed " insoles for her shoes as well as exercises. [MS]      ED Course User Index  [MS] Micki Jaramillo, MAXINE       Labs:    Lab Results (last 24 hours)       ** No results found for the last 24 hours. **             Imaging:    No Radiology Exams Resulted Within Past 24 Hours      Differential Diagnosis and Discussion:    Extremity Pain: Differential diagnosis includes but is not limited to soft tissue sprain, tendonitis, tendon injury, dislocation, fracture, deep vein thrombosis, arterial insufficiency, osteoarthritis, bursitis, and ligamentous damage.        [facility]                 Patient Care Considerations:    I considered completing an x-ray of the left foot however there is no mechanism of injury that would increase suspicion for an Ortho injury.  Additionally patient symptoms are consistent with plantar fasciitis which is not notified on x-ray.      Consultants/Shared Management Plan:    None    Social Determinants of Health:    Patient is independent, reliable, and has access to care.       Disposition and Care Coordination:    Discharged: The patient is suitable and stable for discharge with no need for consideration of admission.    I have explained the patient´s condition, diagnoses and treatment plan based on the information available to me at this time. I have answered questions and addressed any concerns. The patient has a good  understanding of the patient´s diagnosis, condition, and treatment plan as can be expected at this point. The vital signs have been stable. The patient´s condition is stable and appropriate for discharge from the emergency department.      The patient will pursue further outpatient evaluation with the primary care physician or other designated or consulting physician as outlined in the discharge instructions. They are agreeable to this plan of care and follow-up instructions have been explained in detail. The patient has received these instructions in written format and has  expressed an understanding of the discharge instructions. The patient is aware that any significant change in condition or worsening of symptoms should prompt an immediate return to this or the closest emergency department or call to 911.    Final diagnoses:   Plantar fasciitis        ED Disposition       ED Disposition   Discharge    Condition   Stable    Comment   --               This medical record created using voice recognition software.       Micki Jaramillo, MAXINE  03/17/25 1257       Micki Jaramillo, MAXINE  03/17/25 2907

## 2025-03-19 ENCOUNTER — OFFICE VISIT (OUTPATIENT)
Dept: INTERNAL MEDICINE | Facility: CLINIC | Age: 40
End: 2025-03-19
Payer: COMMERCIAL

## 2025-03-19 VITALS
SYSTOLIC BLOOD PRESSURE: 120 MMHG | BODY MASS INDEX: 25.46 KG/M2 | TEMPERATURE: 97.3 F | RESPIRATION RATE: 18 BRPM | WEIGHT: 153 LBS | DIASTOLIC BLOOD PRESSURE: 78 MMHG | HEART RATE: 82 BPM | OXYGEN SATURATION: 98 %

## 2025-03-19 DIAGNOSIS — E78.5 HYPERLIPIDEMIA, UNSPECIFIED HYPERLIPIDEMIA TYPE: ICD-10-CM

## 2025-03-19 DIAGNOSIS — R73.03 PREDIABETES: Primary | ICD-10-CM

## 2025-03-19 DIAGNOSIS — R59.1 LYMPHADENOPATHY: ICD-10-CM

## 2025-03-19 DIAGNOSIS — N89.8 VAGINAL DISCHARGE: ICD-10-CM

## 2025-03-19 LAB
BACTERIA UR QL AUTO: NORMAL /HPF
BILIRUB UR QL STRIP: NEGATIVE
CANDIDA SPECIES: NEGATIVE
CLARITY UR: CLEAR
COLOR UR: ABNORMAL
GARDNERELLA VAGINALIS: NEGATIVE
GLUCOSE UR STRIP-MCNC: NEGATIVE MG/DL
HGB UR QL STRIP.AUTO: NEGATIVE
HYALINE CASTS UR QL AUTO: NORMAL /LPF
KETONES UR QL STRIP: NEGATIVE
LEUKOCYTE ESTERASE UR QL STRIP.AUTO: NEGATIVE
NITRITE UR QL STRIP: NEGATIVE
PH UR STRIP.AUTO: 6 [PH] (ref 5–8)
PROT UR QL STRIP: ABNORMAL
RBC # UR STRIP: NORMAL /HPF
REF LAB TEST METHOD: NORMAL
SP GR UR STRIP: 1.03 (ref 1–1.03)
SQUAMOUS #/AREA URNS HPF: NORMAL /HPF
T VAGINALIS DNA VAG QL PROBE+SIG AMP: NEGATIVE
UROBILINOGEN UR QL STRIP: ABNORMAL
WBC # UR STRIP: NORMAL /HPF

## 2025-03-19 PROCEDURE — 99214 OFFICE O/P EST MOD 30 MIN: CPT | Performed by: STUDENT IN AN ORGANIZED HEALTH CARE EDUCATION/TRAINING PROGRAM

## 2025-03-19 PROCEDURE — 87510 GARDNER VAG DNA DIR PROBE: CPT | Performed by: STUDENT IN AN ORGANIZED HEALTH CARE EDUCATION/TRAINING PROGRAM

## 2025-03-19 PROCEDURE — 87086 URINE CULTURE/COLONY COUNT: CPT | Performed by: STUDENT IN AN ORGANIZED HEALTH CARE EDUCATION/TRAINING PROGRAM

## 2025-03-19 PROCEDURE — 81001 URINALYSIS AUTO W/SCOPE: CPT | Performed by: STUDENT IN AN ORGANIZED HEALTH CARE EDUCATION/TRAINING PROGRAM

## 2025-03-19 PROCEDURE — 87480 CANDIDA DNA DIR PROBE: CPT | Performed by: STUDENT IN AN ORGANIZED HEALTH CARE EDUCATION/TRAINING PROGRAM

## 2025-03-19 PROCEDURE — 87660 TRICHOMONAS VAGIN DIR PROBE: CPT | Performed by: STUDENT IN AN ORGANIZED HEALTH CARE EDUCATION/TRAINING PROGRAM

## 2025-03-19 NOTE — PROGRESS NOTES
Chief Complaint  Prediabetes (Follow up ), Hyperlipidemia, and Abdominal Pain (Notes she is having lower abdominal pain, along with back pain. Started 3/15)    Subjective            Andreina Barnes presents to [facility]  History of Present Illness    History of Present Illness  The patient came in to discuss several concerns, including swollen glands, lower back pain, vaginal discharge, and to review lab results.    Swollen Glands  - She mentioned that she often feels like a gland in her neck is always big and recently noticed another swollen area.    Lower Back Pain  - She has been feeling pressure in her lower back that started last week and wraps around to her pelvic area.  - Last night, the discomfort was quite severe, but today it's not as bad in the front, though it still persists in her lower back.    Vaginal Discharge  - She reported having a thick, cloudy, and sticky discharge.  - She hasn't had any fevers, chills, blood in her urine, itching, or rashes.  - Her urination pattern varies, with some days where she doesn't go much and other days where she goes a lot.  - She doesn't feel any burning when she pees.  - She hasn't had a swab test yet.  - She mentioned feeling some internal tenderness during sex over the past few weeks, which is uncomfortable but not painful.  - She hasn't experienced any nausea or vomiting with these symptoms.    Supplemental information: She hasn't had any recent illnesses but was diagnosed with plantar fasciitis during a recent ER visit. She also has a history of kidney stones. She has been working on managing her cholesterol levels and has noticed a positive change in her energy since starting B12 supplements again.    MEDICATIONS  Current: B12 supplement, vitamin D supplement        Past Medical History:   Diagnosis Date    Anxiety     Dysthymia 09/05/2023    GERD (gastroesophageal reflux disease)     Hormone imbalance      Iron deficiency 09/05/2023    Mass of left breast     Migraine without aura, not refractory 09/05/2023    Pneumonia        Allergies:   Allergies   Allergen Reactions    Amoxicillin Swelling and Rash    Morphine Hives    Nicoderm [Nicotine] Rash     Nicotine patches    Penicillin G Swelling    Penicillin V Swelling          Past Surgical History:   Procedure Laterality Date    APPENDECTOMY      BREAST LUMPECTOMY Left 9/20/2022    Procedure: Excision of left breast mass;  Surgeon: Savana Gaitan MD;  [place] [facility] [place] [facility]  Service: General;  Laterality: Left;    COLONOSCOPY N/A 7/20/2023    Procedure: COLONOSCOPY WITH BIOPSIES;  Surgeon: Jelly Borrero MD;  Location: [facility] ENDOSCOPY;  Service: Gastroenterology;  Laterality: N/A;  DIVERTICULOSIS    ENDOSCOPY N/A 7/20/2023    Procedure: ESOPHAGOGASTRODUODENOSCOPY WITH BIOPSIES;  Surgeon: Jelly Borrero MD;  Location: [facility] ENDOSCOPY;  Service: Gastroenterology;  Laterality: N/A;  HIATAL HERNIA, GASTRITIS, ESOPHAGEAL NODULE    TONSILLECTOMY            Social History     Socioeconomic History    Marital status:    Tobacco Use    Smoking status: Every Day     Current packs/day: 0.25     Average packs/day: 0.3 packs/day for 20.0 years (5.0 ttl pk-yrs)     Types: Cigarettes    Smokeless tobacco: Never    Tobacco comments:     Down to 1 pack a week   Vaping Use    Vaping status: Former   Substance and Sexual Activity    Alcohol use: Yes     Comment: rarely    Drug use: Yes     Types: Marijuana    Sexual activity: Defer         Family History   Problem Relation Age of Onset    Liver cancer Mother 50        started in gallbladder, they removed that and it went to liver    Colon cancer Paternal Grandfather     Diabetes Maternal Grandmother     Diabetes Maternal Uncle     Malig Hyperthermia Neg Hx     Breast cancer Neg Hx     Ovarian cancer Neg Hx     Uterine cancer Neg Hx     Melanoma Neg Hx     Clotting disorder Neg Hx     Deep vein  thrombosis Neg Hx     Pulmonary embolism Neg Hx           Health Maintenance Due   Topic Date Due    Pneumococcal Vaccine 0-49 (1 of 2 - PCV) Never done    COVID-19 Vaccine (1 - 2024-25 season) Never done            Current Outpatient Medications:     albuterol sulfate  (90 Base) MCG/ACT inhaler, Inhale 2 puffs Every 4 (Four) Hours As Needed for Wheezing., Disp: 1 g, Rfl: 0    cholecalciferol (VITAMIN D3) 1.25 MG (00442 UT) capsule, TAKE 1 CAPSULE BY MOUTH ONCE A WEEK AS DIRECTED for 84, Disp: , Rfl:     EPINEPHrine (EPIPEN) 0.3 MG/0.3ML solution auto-injector injection, ADMINISTER 0.3 ML IN THE MUSCLE 1 TIME FOR 1 DOSE AS DIRECTED BY PRESCRIBER, Disp: , Rfl:     fluticasone (FLONASE) 50 MCG/ACT nasal spray, Administer 2 sprays into the nostril(s) as directed by provider Daily., Disp: 16 g, Rfl: 2    ibuprofen (ADVIL,MOTRIN) 600 MG tablet, Take 1 tablet by mouth Every 6 (Six) Hours As Needed for Moderate Pain., Disp: 20 tablet, Rfl: 0    pantoprazole (PROTONIX) 40 MG EC tablet, TAKE 1 TABLET BY MOUTH DAILY, Disp: 30 tablet, Rfl: 1    vitamin B-12 (CYANOCOBALAMIN) 1000 MCG tablet, Take 1 tablet by mouth Daily., Disp: 90 tablet, Rfl: 1    vitamin D (ERGOCALCIFEROL) 1.25 MG (25555 UT) capsule capsule, TAKE 1 CAPSULE BY MOUTH 1 TIME EVERY WEEK FOR 12 DOSES, Disp: 12 capsule, Rfl: 1      Immunization History   Administered Date(s) Administered    Tdap 01/20/2022         Review of Systems       Objective       Vitals:    03/19/25 0959   BP: 120/78   BP Location: Right arm   Patient Position: Sitting   Cuff Size: Adult   Pulse: 82   Resp: 18   Temp: 97.3 °F (36.3 °C)   TempSrc: Temporal   SpO2: 98%   Weight: 69.4 kg (153 lb)     Body mass index is 25.46 kg/m².      Physical Exam  Vitals reviewed.   Constitutional:       Appearance: Normal appearance.   HENT:      Head: Normocephalic and atraumatic.      Nose: Nose normal.   Eyes:      Extraocular Movements: Extraocular movements intact.      Conjunctiva/sclera:  Conjunctivae normal.   Cardiovascular:      Rate and Rhythm: Normal rate and regular rhythm.      Pulses: Normal pulses.      Heart sounds: Normal heart sounds.   Pulmonary:      Effort: Pulmonary effort is normal. No respiratory distress.      Breath sounds: Normal breath sounds.   Musculoskeletal:         General: Normal range of motion.   Skin:     General: Skin is warm and dry.   Neurological:      General: No focal deficit present.      Mental Status: She is alert and oriented to person, place, and time.      Cranial Nerves: No cranial nerve deficit.   Psychiatric:         Mood and Affect: Mood normal.         Behavior: Behavior normal.         Thought Content: Thought content normal.         Physical Exam  Thyroid gland feels normal. Submandibular gland is normal.  Lungs were auscultated.  Heart was examined.  Abdomen was examined.  Back was examined.    Vital Signs  Blood pressure is normal.           Result Review :     Results  Laboratory Studies  White blood cell counts are normal. Creatinine is less than one. Filtration rate is greater than 60. Liver enzymes are normal, including bilirubin. Bicarb is 21.2. A1c remains in the prediabetic range. LDL cholesterol improved from 120 to 111. Thyroid labs are normal. B12 level is normal. Vitamin D level is 32.6.    Imaging  CT scan of head done in December 2023 did not mention anything about the lymph nodes.       The following data was reviewed by: Alyssa King MD on 03/19/2025:    Common labs          10/9/2024    13:03 12/12/2024    08:07 3/12/2025    08:34   Common Labs   Glucose 142  207  100    BUN 7  10  10    Creatinine 0.84  0.92  0.95    Sodium 139  138  140    Potassium 3.9  4.2  4.5    Chloride 105  101  104    Calcium 9.0  9.4  9.6    Albumin 3.8  4.3  4.0    Total Bilirubin 1.0  1.3  1.0    Alkaline Phosphatase 58  54  55    AST (SGOT) 13  22  21    ALT (SGPT) 11  23  17    WBC 12.59  7.58  7.06    Hemoglobin 13.0  13.8  13.9    Hematocrit 38.9   39.8  41.1    Platelets 243  256  261    Total Cholesterol  178  166    Triglycerides  126  104    HDL Cholesterol  35  36    LDL Cholesterol   120  111    Hemoglobin A1C  5.30  5.80                      Assessment and Plan      Diagnoses and all orders for this visit:    1. Prediabetes (Primary)  -     CBC & Differential; Future  -     Comprehensive Metabolic Panel; Future  -     Hemoglobin A1c; Future  -     Lipid Panel; Future  -     TSH; Future    2. Vaginal discharge  -     Urinalysis With Culture If Indicated - Urine, Clean Catch  -     Gardnerella vaginalis, Trichomonas vaginalis, Candida albicans, DNA - Swab, Vagina  -     Urinalysis, Microscopic Only - Urine, Clean Catch  -     Urine Culture - Urine, Urine, Clean Catch    3. Lymphadenopathy    4. Hyperlipidemia, unspecified hyperlipidemia type        Assessment & Plan  Lymphadenopathy.  The lymph nodes in the right side of her neck, both anterior and posterior, are swollen. She has noticed this for about 4 weeks. Previous imaging in 2023 showed no swollen lymph nodes, but prominence of lingual tonsils and adenoids was noted. A CT scan of her head in December 2024 for headaches did not mention lymph nodes. Upon examination, the lymph nodes are very small, mobile, and have regular edges, which is not concerning. She is advised to monitor for any changes such as increased size, irregular edges, or immobility.    Lower back pain.  She reports pressure in her lower back that wraps around to the pelvic area, which started about a week ago. The pain was severe last night but is less intense today. There is no history of recent illness that could cause lymph node inflammation related to this pain. A urine study will be conducted to rule out a urinary tract infection (UTI) or other causes.    Vaginal discharge.  She reports thick, cloudy, and sticky vaginal discharge. There is no fever, chills, blood in urine, itching, or rash. She experiences tenderness during  intercourse, which could indicate a pelvic infection or inflammation. A swab test will be performed to screen for bacterial vaginosis, trichomonas, and yeast infection. If an infection is confirmed, antibiotic treatment will be initiated. If symptoms persist despite improvement in discharge and completion of antibiotics, further investigation will be warranted.    Prediabetes.  Her A1c remains in the prediabetic range. It was previously normalized but has slightly increased. She is advised to be mindful of her diet and lifestyle. A recheck of her A1c will be done in 3 months.    Cholesterol management.  Her cholesterol levels have improved, with LDL decreasing from 120 to 111. The goal is to get LDL closer to 100. She is encouraged to continue her current efforts to manage her cholesterol levels.    Vitamin D deficiency.  Her vitamin D level has improved to 32.6. She is advised to continue taking her vitamin D supplement.    B12 supplementation.  Her B12 levels are within the normal range but not optimal. She is advised to continue her B12 supplementation as it has positively impacted her energy levels.               Follow Up     Return in about 3 months (around 6/19/2025) for prediabetes .    Patient was given instructions and counseling regarding her condition or for health maintenance advice. Please see specific information pulled into the AVS if appropriate.     Alyssa King MD   Internal Medicine-Pediatrics     Patient or patient representative verbalized consent for the use of Ambient Listening during the visit with  Alyssa King MD for chart documentation. 4/7/2025  00:08 EDT

## 2025-03-20 LAB — BACTERIA SPEC AEROBE CULT: NO GROWTH

## 2025-03-21 ENCOUNTER — TELEPHONE (OUTPATIENT)
Dept: INTERNAL MEDICINE | Facility: CLINIC | Age: 40
End: 2025-03-21
Payer: COMMERCIAL

## 2025-03-21 NOTE — TELEPHONE ENCOUNTER
Caller: Andreina Barnes    Relationship to patient: Self    Best call back number: [phone]     Chief complaint: URINE TEST    Type of visit: NURSE VISIT    Requested date: ASAP

## 2025-03-27 NOTE — TELEPHONE ENCOUNTER
Caller: Andreina Barnes    Relationship to patient: Self    Best call back number: [phone]    Patient is needing: PATIENT CALLED REQUESTING AN UPDATE ON GETTING SCHEDULED FOR A NURSE/MA VISIT. PATIENT STATES ON MYCHART PCP TOLD HER SHE HAD ORDERED EXTRA STD TESTING AND NEEDED TO CALL AND SCHEDULE THIS.

## 2025-06-05 ENCOUNTER — TELEPHONE (OUTPATIENT)
Dept: INTERNAL MEDICINE | Facility: CLINIC | Age: 40
End: 2025-06-05

## 2025-06-05 DIAGNOSIS — R13.10 DYSPHAGIA, UNSPECIFIED TYPE: ICD-10-CM

## 2025-06-05 RX ORDER — PANTOPRAZOLE SODIUM 40 MG/1
40 TABLET, DELAYED RELEASE ORAL DAILY
Qty: 90 TABLET | OUTPATIENT
Start: 2025-06-05

## 2025-06-05 RX ORDER — PANTOPRAZOLE SODIUM 40 MG/1
40 TABLET, DELAYED RELEASE ORAL DAILY
Qty: 30 TABLET | Refills: 1 | Status: SHIPPED | OUTPATIENT
Start: 2025-06-05

## 2025-06-05 NOTE — TELEPHONE ENCOUNTER
Caller: Andreina Barnes    Relationship to patient: Self    Best call back number: [phone]    Chief complaint: YELLOWING IN THE WHITES OF BOTH EYES     Type of visit: SAME DAY OR OFFICE     Requested date: TODAY OR TOMORROW

## 2025-06-06 NOTE — TELEPHONE ENCOUNTER
LVM for pt that we don't have any openings today 06-06-25 we can get her in with another provider on Tuesday 06-09-25. Ok for HUB to relay

## 2025-06-11 ENCOUNTER — OFFICE VISIT (OUTPATIENT)
Dept: PODIATRY | Facility: CLINIC | Age: 40
End: 2025-06-11
Payer: COMMERCIAL

## 2025-06-11 VITALS
DIASTOLIC BLOOD PRESSURE: 84 MMHG | SYSTOLIC BLOOD PRESSURE: 121 MMHG | BODY MASS INDEX: 25.66 KG/M2 | HEART RATE: 72 BPM | HEIGHT: 65 IN | TEMPERATURE: 97.9 F | OXYGEN SATURATION: 98 % | WEIGHT: 154 LBS

## 2025-06-11 DIAGNOSIS — M72.2 PLANTAR FASCIITIS: ICD-10-CM

## 2025-06-11 DIAGNOSIS — M79.672 FOOT PAIN, LEFT: Primary | ICD-10-CM

## 2025-06-11 RX ORDER — DICLOFENAC SODIUM 75 MG/1
75 TABLET, DELAYED RELEASE ORAL 2 TIMES DAILY
Qty: 60 TABLET | Refills: 1 | Status: SHIPPED | OUTPATIENT
Start: 2025-06-11 | End: 2025-07-11

## 2025-06-11 RX ORDER — METHYLPREDNISOLONE 4 MG/1
TABLET ORAL
Qty: 21 TABLET | Refills: 0 | Status: SHIPPED | OUTPATIENT
Start: 2025-06-11

## 2025-06-11 NOTE — PROGRESS NOTES
[facility] - PODIATRY    Today's Date: 06/11/25    Patient Name: Andreina Barnes  MRN: [id number]  CSN: [id number]  PCP: Alyssa King MD, Last PCP Visit:  3/19/2025  Referring Provider: William Erickson,*    Patient or patient representative verbalized consent for the use of Ambient Listening during the visit with  Lorenzo Toure DPM for chart documentation. 6/11/2025  09:56 EDT    SUBJECTIVE     Chief Complaint   Patient presents with    Left Foot - Establish Care     Plantar fasciitis of left foot- NO INJURY- IMAGING: XR 04/24/25- NO SX     HPI: Andreina Barnes, a 39 y.o.female, comes to clinic.    New    Location: Left heel    Onset:  gradual    Nature:  achy, sharp, shooting    Aggravating factors:  Patient describes morning left heel pain as stabbing, burning, or aching. This pain usually subsides throughout the day, however it returns after periods of rest and sitting, when standing back up on their feet, and again the next morning.      Patient denies any fevers, chills, nausea, vomiting, shortness of breath, nor any other constitutional signs nor symptoms.    No other pedal complaints at this time.    History of Present Illness  The patient presents for evaluation of left foot pain.    She was diagnosed with plantar fasciitis in her left foot a few months ago, with the onset of symptoms around February or March. Despite adhering to the recommended treatment plan, which included stretching exercises, the use of shoe insoles, and ibuprofen, she has not experienced any improvement. In fact, her condition has deteriorated, prompting her to seek care at an acute care facility. An x-ray was performed there, revealing the presence of a heel spur. She reports no specific incident that could have triggered the condition but notes that her job as a  at [employer] involves prolonged standing. She has been using Dr. Vu's insoles and has been prescribed ibuprofen 800 mg.  She has also been applying ice to the affected area.    SOCIAL HISTORY  She works as a  at [employer].    MEDICATIONS  Ibuprofen      Past Medical History:   Diagnosis Date    Anxiety     Dysthymia 09/05/2023    GERD (gastroesophageal reflux disease)     Hormone imbalance     Iron deficiency 09/05/2023    Mass of left breast     Migraine without aura, not refractory 09/05/2023    Pneumonia      Past Surgical History:   Procedure Laterality Date    APPENDECTOMY      BREAST LUMPECTOMY Left 9/20/2022    Procedure: Excision of left breast mass;  Surgeon: Savana Gaitan MD;  [place] [facility] [place] [facility] [place]: General;  Laterality: Left;    COLONOSCOPY N/A 7/20/2023    Procedure: COLONOSCOPY WITH BIOPSIES;  Surgeon: Jelly Borrero MD;  Location: [facility] ENDOSCOPY;  Service: Gastroenterology;  Laterality: N/A;  DIVERTICULOSIS    ENDOSCOPY N/A 7/20/2023    Procedure: ESOPHAGOGASTRODUODENOSCOPY WITH BIOPSIES;  Surgeon: Jelly Borrero MD;  Location: [facility] ENDOSCOPY;  Service: Gastroenterology;  Laterality: N/A;  HIATAL HERNIA, GASTRITIS, ESOPHAGEAL NODULE    TONSILLECTOMY       Family History   Problem Relation Age of Onset    Liver cancer Mother 50        started in gallbladder, they removed that and it went to liver    Colon cancer Paternal Grandfather     Diabetes Maternal Grandmother     Diabetes Maternal Uncle     Malig Hyperthermia Neg Hx     Breast cancer Neg Hx     Ovarian cancer Neg Hx     Uterine cancer Neg Hx     Melanoma Neg Hx     Clotting disorder Neg Hx     Deep vein thrombosis Neg Hx     Pulmonary embolism Neg Hx      Social History     Socioeconomic History    Marital status:    Tobacco Use    Smoking status: Every Day     Current packs/day: 0.25     Average packs/day: 0.3 packs/day for 20.0 years (5.0 ttl pk-yrs)     Types: Cigarettes    Smokeless tobacco: Never    Tobacco comments:     Down to 1 pack a week   Vaping Use    Vaping status: Former   Substance and Sexual  Activity    Alcohol use: Yes     Comment: rarely    Drug use: Yes     Types: Marijuana    Sexual activity: Defer     Allergies   Allergen Reactions    Amoxicillin Swelling and Rash    Morphine Hives    Nicoderm [Nicotine] Rash     Nicotine patches    Penicillin G Swelling    Penicillin V Swelling     Current Outpatient Medications   Medication Sig Dispense Refill    albuterol sulfate  (90 Base) MCG/ACT inhaler Inhale 2 puffs Every 4 (Four) Hours As Needed for Wheezing. 1 g 0    cholecalciferol (VITAMIN D3) 1.25 MG (77569 UT) capsule TAKE 1 CAPSULE BY MOUTH ONCE A WEEK AS DIRECTED for 84      EPINEPHrine (EPIPEN) 0.3 MG/0.3ML solution auto-injector injection ADMINISTER 0.3 ML IN THE MUSCLE 1 TIME FOR 1 DOSE AS DIRECTED BY PRESCRIBER      fluticasone (FLONASE) 50 MCG/ACT nasal spray Administer 2 sprays into the nostril(s) as directed by provider Daily. 16 g 2    meloxicam (MOBIC) 15 MG tablet Take 1 tablet by mouth Daily. 30 tablet 0    pantoprazole (PROTONIX) 40 MG EC tablet TAKE 1 TABLET BY MOUTH DAILY 30 tablet 1    vitamin B-12 (CYANOCOBALAMIN) 1000 MCG tablet Take 1 tablet by mouth Daily. 90 tablet 1    vitamin D (ERGOCALCIFEROL) 1.25 MG (71405 UT) capsule capsule TAKE 1 CAPSULE BY MOUTH 1 TIME EVERY WEEK FOR 12 DOSES 12 capsule 1    diclofenac (VOLTAREN) 75 MG EC tablet Take 1 tablet by mouth 2 (Two) Times a Day for 30 days. 60 tablet 1    methylPREDNISolone (MEDROL) 4 MG dose pack Take as directed 21 tablet 0     No current facility-administered medications for this visit.       OBJECTIVE     Vitals:    06/11/25 0914   BP: 121/84   Pulse: 72   Temp: 97.9 °F (36.6 °C)   SpO2: 98%       PHYSICAL EXAM     Foot/Ankle Exam    GENERAL  Appearance:  appears stated age  Orientation:  AAOx3  Affect:  appropriate  Gait:  unimpaired  Assistance:  independent  Right shoe gear: casual shoe  Left shoe gear: casual shoe    VASCULAR     Right Foot Vascularity   Normal vascular exam    Dorsalis pedis:  2+  Posterior  tibial:  2+  Skin temperature:  warm  Edema grading:  None  CFT:  < 3 seconds  Pedal hair growth:  Present  Varicosities:  none     Left Foot Vascularity   Normal vascular exam    Dorsalis pedis:  2+  Posterior tibial:  2+  Skin temperature:  warm  Edema grading:  None  CFT:  < 3 seconds  Pedal hair growth:  Present  Varicosities:  none     NEUROLOGIC     Right Foot Neurologic   Normal sensation    Light touch sensation: normal  Vibratory sensation: normal  Hot/Cold sensation: normal     Left Foot Neurologic   Normal sensation    Light touch sensation: normal  Vibratory sensation: normal  Hot/Cold sensation:  normal    MUSCULOSKELETAL     Left Foot Musculoskeletal   Ecchymosis:  none  Tenderness:  plantar fascia tenderness    MUSCLE STRENGTH     Right Foot Muscle Strength   Foot dorsiflexion:  4  Foot plantar flexion:  4  Foot inversion:  4  Foot eversion:  4     Left Foot Muscle Strength   Foot dorsiflexion:  4  Foot plantar flexion:  4  Foot inversion:  4  Foot eversion:  4    RANGE OF MOTION     Right Foot Range of Motion   Foot and ankle ROM within normal limits       Left Foot Range of Motion   Foot and ankle ROM within normal limits      DERMATOLOGIC      Right Foot Dermatologic   Skin  Right foot skin is intact.   Nails comment:  Toenails 1, 2, 3, 4, and 5     Left Foot Dermatologic   Skin  Left foot skin is intact.   Nails comment:  Toenails 1, 2, 3, 4, and 5      RADIOLOGY:    No results found.     Physical Exam      XR Foot 3+ View Left  Result Date: 4/24/2025  Impression: 1.No acute fracture or dislocation. 2.Plantar calcaneal spurring. 3.Punctate radiopaque foreign body either just below the skin surface, or on the skin surface at the fifth MTP joint. Electronically Signed: Lorenzo Trimble MD  4/24/2025 1:35 PM EDT  Workstation ID: [id number]    ASSESSMENT/PLAN     Diagnoses and all orders for this visit:    1. Foot pain, left (Primary)    2. Plantar fasciitis  -     Ambulatory Referral For Orthotics  -      methylPREDNISolone (MEDROL) 4 MG dose pack; Take as directed  Dispense: 21 tablet; Refill: 0  -     diclofenac (VOLTAREN) 75 MG EC tablet; Take 1 tablet by mouth 2 (Two) Times a Day for 30 days.  Dispense: 60 tablet; Refill: 1        Assessment & Plan  1. Plantar fasciitis.  The patient reports persistent pain in the left foot despite using shoe inserts and taking ibuprofen. An x-ray revealed a heel spur, but the primary concern is the ligament on the bottom of the foot. She was advised to continue using ice and over-the-counter anti-inflammatory medications like diclofenac as needed. She was also instructed to perform leg stretches and avoid stretching the foot directly. A prescription for custom orthotics was provided, but she was encouraged to try over-the-counter arch supports first. If symptoms persist, a 6-day course of steroids may be considered before opting for cortisone injections.    Rx:  Custom-made orthotics    Comprehensive lower extremity examination and evaluation was performed.    Discussed findings and treatment plan including risks, benefits, and treatment options with patient in detail. Patient agreed with treatment plan.    Patient tolerated the procedure well with no immediate complications.    Treatment Options discussed:  - no treatment at all  - change in shoegear  - change in activities  - RICE therapy  - arch support  - NSAIDs  - PO steroids  - injectable steroids    Patient may begin to weight bear as tolerated in supportive shoes.  No impact activities for two weeks.  After that time, the patient may increase activities as tolerated.     Rice Therapy: It is important to treat any injury as soon as possible to help control swelling and increase recovery time. The recognized regimen for immediate treatment of sport injuries includes rest, ice (cold application), compression, and elevation (RICE). Remove the injured athlete from play, apply ice to the affected area, wrap or compress the  injured area with an elastic bandage when appropriate, and elevate the injured area above heart level to reduce swelling.  The patient is to not use ice for longer than 20 minutes at a time, with at least 20 minutes of no ice usage between applications.     Patient instructed use OTC analgesics with dosing per package insert as needed.      Discussed proper shoegear for the patient's feet and medical condition.      The patient states understanding and agreement with these plans    An After Visit Summary was printed and given to the patient at discharge, including (if requested) any available informative/educational handouts regarding diagnosis, treatment, or medications. All questions were answered to patient/family satisfaction. Should symptoms fail to improve or worsen they agree to call or return to clinic or to go to the Emergency Department. Discussed the importance of following up with any needed screening tests/labs/specialist appointments and any requested follow-up recommended by me today. Importance of maintaining follow-up discussed and patient accepts that missed appointments can delay diagnosis and potentially lead to worsening of conditions.    Return if symptoms worsen or fail to improve., or sooner if acute issues arise.    This document has been electronically signed by Lorenzo Toure DPM on June 11, 2025 10:08 EDT

## 2025-06-13 ENCOUNTER — CLINICAL SUPPORT (OUTPATIENT)
Dept: INTERNAL MEDICINE | Facility: CLINIC | Age: 40
End: 2025-06-13
Payer: COMMERCIAL

## 2025-06-13 DIAGNOSIS — E03.9 HYPOTHYROIDISM, UNSPECIFIED TYPE: ICD-10-CM

## 2025-06-13 DIAGNOSIS — E61.1 IRON DEFICIENCY: ICD-10-CM

## 2025-06-13 DIAGNOSIS — E78.5 HYPERLIPIDEMIA, UNSPECIFIED HYPERLIPIDEMIA TYPE: ICD-10-CM

## 2025-06-13 DIAGNOSIS — E53.8 B12 DEFICIENCY: Primary | ICD-10-CM

## 2025-06-13 DIAGNOSIS — R73.03 PREDIABETES: ICD-10-CM

## 2025-06-13 DIAGNOSIS — R32 URINARY INCONTINENCE, UNSPECIFIED TYPE: ICD-10-CM

## 2025-06-13 LAB
ALBUMIN SERPL-MCNC: 4.3 G/DL (ref 3.5–5.2)
ALBUMIN/GLOB SERPL: 1.5 G/DL
ALP SERPL-CCNC: 56 U/L (ref 39–117)
ALT SERPL W P-5'-P-CCNC: 17 U/L (ref 1–33)
ANION GAP SERPL CALCULATED.3IONS-SCNC: 7.9 MMOL/L (ref 5–15)
AST SERPL-CCNC: 16 U/L (ref 1–32)
BASOPHILS # BLD AUTO: 0.1 10*3/MM3 (ref 0–0.2)
BASOPHILS NFR BLD AUTO: 1.2 % (ref 0–1.5)
BILIRUB SERPL-MCNC: 0.5 MG/DL (ref 0–1.2)
BUN SERPL-MCNC: 8 MG/DL (ref 6–20)
BUN/CREAT SERPL: 9.5 (ref 7–25)
CALCIUM SPEC-SCNC: 9.2 MG/DL (ref 8.6–10.5)
CHLORIDE SERPL-SCNC: 102 MMOL/L (ref 98–107)
CHOLEST SERPL-MCNC: 162 MG/DL (ref 0–200)
CO2 SERPL-SCNC: 27.1 MMOL/L (ref 22–29)
CREAT SERPL-MCNC: 0.84 MG/DL (ref 0.57–1)
DEPRECATED RDW RBC AUTO: 40.9 FL (ref 37–54)
EGFRCR SERPLBLD CKD-EPI 2021: 91.3 ML/MIN/1.73
EOSINOPHIL # BLD AUTO: 0.58 10*3/MM3 (ref 0–0.4)
EOSINOPHIL NFR BLD AUTO: 7 % (ref 0.3–6.2)
ERYTHROCYTE [DISTWIDTH] IN BLOOD BY AUTOMATED COUNT: 11.9 % (ref 12.3–15.4)
GLOBULIN UR ELPH-MCNC: 2.8 GM/DL
GLUCOSE SERPL-MCNC: 110 MG/DL (ref 65–99)
HBA1C MFR BLD: 5.6 % (ref 4.8–5.6)
HCT VFR BLD AUTO: 40.5 % (ref 34–46.6)
HDLC SERPL-MCNC: 30 MG/DL (ref 40–60)
HGB BLD-MCNC: 13.9 G/DL (ref 12–15.9)
IMM GRANULOCYTES # BLD AUTO: 0.02 10*3/MM3 (ref 0–0.05)
IMM GRANULOCYTES NFR BLD AUTO: 0.2 % (ref 0–0.5)
LDLC SERPL CALC-MCNC: 100 MG/DL (ref 0–100)
LDLC/HDLC SERPL: 3.2 {RATIO}
LYMPHOCYTES # BLD AUTO: 3.99 10*3/MM3 (ref 0.7–3.1)
LYMPHOCYTES NFR BLD AUTO: 47.8 % (ref 19.6–45.3)
MCH RBC QN AUTO: 32.4 PG (ref 26.6–33)
MCHC RBC AUTO-ENTMCNC: 34.3 G/DL (ref 31.5–35.7)
MCV RBC AUTO: 94.4 FL (ref 79–97)
MONOCYTES # BLD AUTO: 0.82 10*3/MM3 (ref 0.1–0.9)
MONOCYTES NFR BLD AUTO: 9.8 % (ref 5–12)
NEUTROPHILS NFR BLD AUTO: 2.83 10*3/MM3 (ref 1.7–7)
NEUTROPHILS NFR BLD AUTO: 34 % (ref 42.7–76)
NRBC BLD AUTO-RTO: 0 /100 WBC (ref 0–0.2)
PLATELET # BLD AUTO: 246 10*3/MM3 (ref 140–450)
PMV BLD AUTO: 11 FL (ref 6–12)
POTASSIUM SERPL-SCNC: 4.2 MMOL/L (ref 3.5–5.2)
PROT SERPL-MCNC: 7.1 G/DL (ref 6–8.5)
RBC # BLD AUTO: 4.29 10*6/MM3 (ref 3.77–5.28)
SODIUM SERPL-SCNC: 137 MMOL/L (ref 136–145)
TRIGL SERPL-MCNC: 180 MG/DL (ref 0–150)
TSH SERPL DL<=0.05 MIU/L-ACNC: 1.64 UIU/ML (ref 0.27–4.2)
VLDLC SERPL-MCNC: 32 MG/DL (ref 5–40)
WBC NRBC COR # BLD AUTO: 8.34 10*3/MM3 (ref 3.4–10.8)

## 2025-06-13 PROCEDURE — 83036 HEMOGLOBIN GLYCOSYLATED A1C: CPT | Performed by: STUDENT IN AN ORGANIZED HEALTH CARE EDUCATION/TRAINING PROGRAM

## 2025-06-13 PROCEDURE — 87591 N.GONORRHOEAE DNA AMP PROB: CPT | Performed by: STUDENT IN AN ORGANIZED HEALTH CARE EDUCATION/TRAINING PROGRAM

## 2025-06-13 PROCEDURE — 80061 LIPID PANEL: CPT | Performed by: STUDENT IN AN ORGANIZED HEALTH CARE EDUCATION/TRAINING PROGRAM

## 2025-06-13 PROCEDURE — 36415 COLL VENOUS BLD VENIPUNCTURE: CPT | Performed by: STUDENT IN AN ORGANIZED HEALTH CARE EDUCATION/TRAINING PROGRAM

## 2025-06-13 PROCEDURE — 80050 GENERAL HEALTH PANEL: CPT | Performed by: STUDENT IN AN ORGANIZED HEALTH CARE EDUCATION/TRAINING PROGRAM

## 2025-06-13 PROCEDURE — 87491 CHLMYD TRACH DNA AMP PROBE: CPT | Performed by: STUDENT IN AN ORGANIZED HEALTH CARE EDUCATION/TRAINING PROGRAM

## 2025-06-13 NOTE — PROGRESS NOTES
Venipuncture Blood Specimen Collection  Venipuncture performed in [facility] by Fozia Granger MA with good hemostasis. Patient tolerated the procedure well without complications.   06/13/25   Fozia Granger MA

## 2025-06-19 ENCOUNTER — OFFICE VISIT (OUTPATIENT)
Dept: INTERNAL MEDICINE | Facility: CLINIC | Age: 40
End: 2025-06-19
Payer: COMMERCIAL

## 2025-06-19 VITALS
DIASTOLIC BLOOD PRESSURE: 62 MMHG | HEIGHT: 65 IN | HEART RATE: 70 BPM | BODY MASS INDEX: 26.36 KG/M2 | WEIGHT: 158.2 LBS | TEMPERATURE: 97.5 F | SYSTOLIC BLOOD PRESSURE: 116 MMHG | OXYGEN SATURATION: 97 %

## 2025-06-19 DIAGNOSIS — R73.03 PREDIABETES: Primary | ICD-10-CM

## 2025-06-19 DIAGNOSIS — E78.2 MIXED HYPERLIPIDEMIA: ICD-10-CM

## 2025-06-19 DIAGNOSIS — E55.9 VITAMIN D DEFICIENCY: ICD-10-CM

## 2025-06-19 PROCEDURE — 99214 OFFICE O/P EST MOD 30 MIN: CPT | Performed by: STUDENT IN AN ORGANIZED HEALTH CARE EDUCATION/TRAINING PROGRAM

## 2025-06-19 NOTE — PROGRESS NOTES
Chief Complaint  Follow-up (3 Month Follow Up ) and Prediabetes    Subjective            Candy Arelis Barnes presents to [facility]  History of Present Illness    History of Present Illness  The patient came in for a follow-up on her prediabetes and foot pain.    Prediabetes  - She has been sticking to a healthy diet, which has helped improve her cholesterol levels.  - She has made big changes in her lifestyle, like cutting out snacks from her diet.  - She isn't taking any medication for her prediabetes or cholesterol.  - Her recent lab results showed that her A1c is back to normal, her LDL cholesterol has improved, and her thyroid, liver, and kidney functions are all normal.    Foot Pain  - She mentioned that her foot pain comes and goes.  - She manages it by rolling a frozen can over it for 15 to 20 minutes after work, which helps a bit.  - She is seeing a podiatrist who told her to come back for a steroid injection if the pain doesn't get better.  - She was recently given a steroid pack for her foot.  - She was taking meloxicam 15 mg once daily for her foot but has stopped using it.    Supplemental information: She uses an albuterol inhaler when needed, takes vitamin D, uses Flonase, and takes Protonix for heartburn. She also takes B12 and vitamin D supplements.        Past Medical History:   Diagnosis Date    Anxiety     Dysthymia 09/05/2023    GERD (gastroesophageal reflux disease)     Hormone imbalance     Iron deficiency 09/05/2023    Mass of left breast     Migraine without aura, not refractory 09/05/2023    Pneumonia        Allergies:   Allergies   Allergen Reactions    Amoxicillin Swelling and Rash    Morphine Hives    Nicoderm [Nicotine] Rash     Nicotine patches    Penicillin G Swelling    Penicillin V Swelling          Past Surgical History:   Procedure Laterality Date    APPENDECTOMY      BREAST LUMPECTOMY Left 9/20/2022    Procedure: Excision of left  breast mass;  Surgeon: Savana Gaitan MD;  Location: [facility];  Service: General;  Laterality: Left;    COLONOSCOPY N/A 7/20/2023    Procedure: COLONOSCOPY WITH BIOPSIES;  Surgeon: Jelly Borrero MD;  Location: [facility] ENDOSCOPY;  Service: Gastroenterology;  Laterality: N/A;  DIVERTICULOSIS    ENDOSCOPY N/A 7/20/2023    Procedure: ESOPHAGOGASTRODUODENOSCOPY WITH BIOPSIES;  Surgeon: Jelly Borrero MD;  Location: [facility] ENDOSCOPY;  Service: Gastroenterology;  Laterality: N/A;  HIATAL HERNIA, GASTRITIS, ESOPHAGEAL NODULE    TONSILLECTOMY            Social History     Socioeconomic History    Marital status:    Tobacco Use    Smoking status: Every Day     Current packs/day: 0.25     Average packs/day: 0.3 packs/day for 20.0 years (5.0 ttl pk-yrs)     Types: Cigarettes    Smokeless tobacco: Never    Tobacco comments:     Down to 1 pack a week   Vaping Use    Vaping status: Former   Substance and Sexual Activity    Alcohol use: Yes     Comment: rarely    Drug use: Yes     Types: Marijuana    Sexual activity: Defer         Family History   Problem Relation Age of Onset    Liver cancer Mother 50        started in gallbladder, they removed that and it went to liver    Colon cancer Paternal Grandfather     Diabetes Maternal Grandmother     Diabetes Maternal Uncle     Malig Hyperthermia Neg Hx     Breast cancer Neg Hx     Ovarian cancer Neg Hx     Uterine cancer Neg Hx     Melanoma Neg Hx     Clotting disorder Neg Hx     Deep vein thrombosis Neg Hx     Pulmonary embolism Neg Hx           Health Maintenance Due   Topic Date Due    Pneumococcal Vaccine 0-49 (1 of 2 - PCV) Never done    COVID-19 Vaccine (1 - 2024-25 season) Never done            Current Outpatient Medications:     albuterol sulfate  (90 Base) MCG/ACT inhaler, Inhale 2 puffs Every 4 (Four) Hours As Needed for Wheezing., Disp: 1 g, Rfl: 0    diclofenac (VOLTAREN) 75 MG EC tablet, Take 1 tablet by mouth 2 (Two) Times a Day  "for 30 days., Disp: 60 tablet, Rfl: 1    EPINEPHrine (EPIPEN) 0.3 MG/0.3ML solution auto-injector injection, ADMINISTER 0.3 ML IN THE MUSCLE 1 TIME FOR 1 DOSE AS DIRECTED BY PRESCRIBER, Disp: , Rfl:     fluticasone (FLONASE) 50 MCG/ACT nasal spray, Administer 2 sprays into the nostril(s) as directed by provider Daily., Disp: 16 g, Rfl: 2    meloxicam (MOBIC) 15 MG tablet, Take 1 tablet by mouth Daily., Disp: 30 tablet, Rfl: 0    methylPREDNISolone (MEDROL) 4 MG dose pack, Take as directed, Disp: 21 tablet, Rfl: 0    pantoprazole (PROTONIX) 40 MG EC tablet, TAKE 1 TABLET BY MOUTH DAILY, Disp: 30 tablet, Rfl: 1    vitamin B-12 (CYANOCOBALAMIN) 1000 MCG tablet, Take 1 tablet by mouth Daily., Disp: 90 tablet, Rfl: 1    vitamin D (ERGOCALCIFEROL) 1.25 MG (82750 UT) capsule capsule, TAKE 1 CAPSULE BY MOUTH 1 TIME EVERY WEEK FOR 12 DOSES, Disp: 12 capsule, Rfl: 1      Immunization History   Administered Date(s) Administered    Tdap 01/20/2022         Review of Systems       Objective       Vitals:    06/19/25 0924   BP: 116/62   Pulse: 70   Temp: 97.5 °F (36.4 °C)   TempSrc: Temporal   SpO2: 97%   Weight: 71.8 kg (158 lb 3.2 oz)   Height: 165.1 cm (65\")     Body mass index is 26.33 kg/m².      Physical Exam  Vitals reviewed.   Constitutional:       Appearance: Normal appearance.   HENT:      Head: Normocephalic and atraumatic.      Nose: Nose normal.   Eyes:      Extraocular Movements: Extraocular movements intact.      Conjunctiva/sclera: Conjunctivae normal.   Pulmonary:      Effort: Pulmonary effort is normal. No respiratory distress.   Musculoskeletal:         General: Normal range of motion.   Skin:     General: Skin is warm and dry.   Neurological:      General: No focal deficit present.      Mental Status: She is alert and oriented to person, place, and time.      Cranial Nerves: No cranial nerve deficit.   Psychiatric:         Mood and Affect: Mood normal.         Behavior: Behavior normal.         Thought Content: " Thought content normal.         Physical Exam  Neck: Supple, no abnormalities  Respiratory: Clear to auscultation, no wheezing, rales or rhonchi  Cardiovascular: Regular rate and rhythm, no murmurs, rubs, or gallops           Result Review :     Results  Labs   - TSH: Normal   - LDL cholesterol: 100 mg/dL   - A1c: Normalized   - Liver enzymes: Normal   - Kidney function: Normal   - Filtration rate: 91.3 mL/min   - Creatinine: 0.84 mg/dL   - Blood sugar: 110 mg/dL   - Hemoglobin: Normal   - RDW: 11.9%   - Total population of white blood cells: Normal   - Neutrophils: Slightly off normal   - Lymphocytes: Slightly off normal   - Eosinophils: Slightly high   - Screening for chlamydia and gonorrhea: Negative                             Assessment and Plan      Diagnoses and all orders for this visit:    1. Prediabetes (Primary)  -     CBC & Differential; Future  -     Comprehensive Metabolic Panel; Future  -     Hemoglobin A1c; Future  -     TSH; Future  -     T4, free; Future    2. Mixed hyperlipidemia  -     Lipid Panel; Future    3. Vitamin D deficiency  -     Vitamin D 25 hydroxy; Future        Assessment & Plan  Prediabetes.  - Her A1c levels have normalized, indicating effective management of her prediabetes through lifestyle modifications.  - Blood glucose level was slightly elevated at 110, but this is not concerning given the normalized A1c.  - She will continue to monitor her A1c levels every 3 months until June 2026.  - If her A1c remains stable, the frequency of monitoring may be reduced to every 4 to 6 months.    Cholesterol management.  - Her LDL cholesterol levels have improved significantly, now at 100, while her HDL levels have slightly decreased and triglycerides have marginally increased.  - She will continue her current lifestyle modifications, including a healthy diet and regular exercise.  - Her cholesterol levels will be rechecked in 3 months.    Foot pain.  - She reports intermittent foot pain,  which she manages by rolling a frozen can over it for 15 to 20 minutes after work.  - This method provides some relief.  - She is under the care of Dr. Toure, a podiatrist, who has advised her to return for a steroid injection if the pain persists.    Health maintenance.  - Her thyroid function is within normal limits, and her liver and kidney functions are excellent.  - Her blood count is also within normal range, with a slight deviation in neutrophils and lymphocytes, which is not clinically significant.  - She is up to date with all recommended vaccines. She is eligible for the pneumonia vaccine but has declined it at this time.  - A repeat screening for chlamydia and gonorrhea was negative. Her vitamin D levels will be checked during her next lab work in 3 months.               Follow Up     No follow-ups on file.    Patient was given instructions and counseling regarding her condition or for health maintenance advice. Please see specific information pulled into the AVS if appropriate.     Alyssa King MD   Internal Medicine-Pediatrics     Patient or patient representative verbalized consent for the use of Ambient Listening during the visit with  Alyssa King MD for chart documentation. 6/19/2025  10:12 EDT

## 2025-07-03 ENCOUNTER — HOSPITAL ENCOUNTER (EMERGENCY)
Facility: HOSPITAL | Age: 40
Discharge: HOME | End: 2025-07-03
Attending: EMERGENCY MEDICINE
Payer: COMMERCIAL

## 2025-07-03 VITALS
TEMPERATURE: 97.3 F | HEIGHT: 65 IN | BODY MASS INDEX: 26.12 KG/M2 | HEART RATE: 70 BPM | OXYGEN SATURATION: 100 % | DIASTOLIC BLOOD PRESSURE: 65 MMHG | WEIGHT: 156.75 LBS | RESPIRATION RATE: 18 BRPM | SYSTOLIC BLOOD PRESSURE: 113 MMHG

## 2025-07-03 DIAGNOSIS — R31.9 URINARY TRACT INFECTION WITH HEMATURIA, SITE UNSPECIFIED: Primary | ICD-10-CM

## 2025-07-03 DIAGNOSIS — N39.0 URINARY TRACT INFECTION WITH HEMATURIA, SITE UNSPECIFIED: Primary | ICD-10-CM

## 2025-07-03 LAB
ALBUMIN SERPL-MCNC: 4.5 G/DL (ref 3.5–5.2)
ALBUMIN/GLOB SERPL: 1.6 G/DL
ALP SERPL-CCNC: 61 U/L (ref 39–117)
ALT SERPL W P-5'-P-CCNC: 14 U/L (ref 1–33)
ANION GAP SERPL CALCULATED.3IONS-SCNC: 12.8 MMOL/L (ref 5–15)
AST SERPL-CCNC: 12 U/L (ref 1–32)
BACTERIA UR QL AUTO: ABNORMAL /HPF
BASOPHILS # BLD AUTO: 0.09 10*3/MM3 (ref 0–0.2)
BASOPHILS NFR BLD AUTO: 1.1 % (ref 0–1.5)
BILIRUB SERPL-MCNC: 0.4 MG/DL (ref 0–1.2)
BILIRUB UR QL STRIP: NEGATIVE
BUN SERPL-MCNC: 4.3 MG/DL (ref 6–20)
BUN/CREAT SERPL: 5.2 (ref 7–25)
CALCIUM SPEC-SCNC: 8.8 MG/DL (ref 8.6–10.5)
CHLORIDE SERPL-SCNC: 104 MMOL/L (ref 98–107)
CLARITY UR: ABNORMAL
CO2 SERPL-SCNC: 20.2 MMOL/L (ref 22–29)
COLOR UR: ABNORMAL
CREAT SERPL-MCNC: 0.83 MG/DL (ref 0.57–1)
DEPRECATED RDW RBC AUTO: 43.9 FL (ref 37–54)
EGFRCR SERPLBLD CKD-EPI 2021: 92.7 ML/MIN/1.73
EOSINOPHIL # BLD AUTO: 0.46 10*3/MM3 (ref 0–0.4)
EOSINOPHIL NFR BLD AUTO: 5.6 % (ref 0.3–6.2)
ERYTHROCYTE [DISTWIDTH] IN BLOOD BY AUTOMATED COUNT: 12.4 % (ref 12.3–15.4)
GLOBULIN UR ELPH-MCNC: 2.8 GM/DL
GLUCOSE SERPL-MCNC: 141 MG/DL (ref 65–99)
GLUCOSE UR STRIP-MCNC: NEGATIVE MG/DL
HCG INTACT+B SERPL-ACNC: <0.5 MIU/ML
HCT VFR BLD AUTO: 43.7 % (ref 34–46.6)
HGB BLD-MCNC: 14.2 G/DL (ref 12–15.9)
HGB UR QL STRIP.AUTO: ABNORMAL
HOLD SPECIMEN: NORMAL
HOLD SPECIMEN: NORMAL
HYALINE CASTS UR QL AUTO: ABNORMAL /LPF
IMM GRANULOCYTES # BLD AUTO: 0.02 10*3/MM3 (ref 0–0.05)
IMM GRANULOCYTES NFR BLD AUTO: 0.2 % (ref 0–0.5)
KETONES UR QL STRIP: ABNORMAL
LEUKOCYTE ESTERASE UR QL STRIP.AUTO: ABNORMAL
LIPASE SERPL-CCNC: 26 U/L (ref 13–60)
LYMPHOCYTES # BLD AUTO: 2.97 10*3/MM3 (ref 0.7–3.1)
LYMPHOCYTES NFR BLD AUTO: 36 % (ref 19.6–45.3)
MCH RBC QN AUTO: 31.2 PG (ref 26.6–33)
MCHC RBC AUTO-ENTMCNC: 32.5 G/DL (ref 31.5–35.7)
MCV RBC AUTO: 96 FL (ref 79–97)
MONOCYTES # BLD AUTO: 0.76 10*3/MM3 (ref 0.1–0.9)
MONOCYTES NFR BLD AUTO: 9.2 % (ref 5–12)
NEUTROPHILS NFR BLD AUTO: 3.95 10*3/MM3 (ref 1.7–7)
NEUTROPHILS NFR BLD AUTO: 47.9 % (ref 42.7–76)
NITRITE UR QL STRIP: POSITIVE
NRBC BLD AUTO-RTO: 0 /100 WBC (ref 0–0.2)
PH UR STRIP.AUTO: 5.5 [PH] (ref 5–8)
PLATELET # BLD AUTO: 242 10*3/MM3 (ref 140–450)
PMV BLD AUTO: 10.7 FL (ref 6–12)
POTASSIUM SERPL-SCNC: 3.7 MMOL/L (ref 3.5–5.2)
PROT SERPL-MCNC: 7.3 G/DL (ref 6–8.5)
PROT UR QL STRIP: ABNORMAL
RBC # BLD AUTO: 4.55 10*6/MM3 (ref 3.77–5.28)
RBC # UR STRIP: ABNORMAL /HPF
REF LAB TEST METHOD: ABNORMAL
SODIUM SERPL-SCNC: 137 MMOL/L (ref 136–145)
SP GR UR STRIP: 1.02 (ref 1–1.03)
SQUAMOUS #/AREA URNS HPF: ABNORMAL /HPF
UROBILINOGEN UR QL STRIP: ABNORMAL
WBC # UR STRIP: ABNORMAL /HPF
WBC NRBC COR # BLD AUTO: 8.25 10*3/MM3 (ref 3.4–10.8)
WHOLE BLOOD HOLD COAG: NORMAL
WHOLE BLOOD HOLD SPECIMEN: NORMAL

## 2025-07-03 PROCEDURE — 85025 COMPLETE CBC W/AUTO DIFF WBC: CPT | Performed by: EMERGENCY MEDICINE

## 2025-07-03 PROCEDURE — 83690 ASSAY OF LIPASE: CPT | Performed by: EMERGENCY MEDICINE

## 2025-07-03 PROCEDURE — 84702 CHORIONIC GONADOTROPIN TEST: CPT | Performed by: EMERGENCY MEDICINE

## 2025-07-03 PROCEDURE — 96374 THER/PROPH/DIAG INJ IV PUSH: CPT

## 2025-07-03 PROCEDURE — 99283 EMERGENCY DEPT VISIT LOW MDM: CPT

## 2025-07-03 PROCEDURE — 87086 URINE CULTURE/COLONY COUNT: CPT

## 2025-07-03 PROCEDURE — 25010000002 KETOROLAC TROMETHAMINE PER 15 MG

## 2025-07-03 PROCEDURE — 80053 COMPREHEN METABOLIC PANEL: CPT | Performed by: EMERGENCY MEDICINE

## 2025-07-03 PROCEDURE — 81001 URINALYSIS AUTO W/SCOPE: CPT | Performed by: EMERGENCY MEDICINE

## 2025-07-03 RX ORDER — SODIUM CHLORIDE 0.9 % (FLUSH) 0.9 %
10 SYRINGE (ML) INJECTION AS NEEDED
Status: DISCONTINUED | OUTPATIENT
Start: 2025-07-03 | End: 2025-07-03 | Stop reason: HOSPADM

## 2025-07-03 RX ORDER — SULFAMETHOXAZOLE AND TRIMETHOPRIM 800; 160 MG/1; MG/1
1 TABLET ORAL 2 TIMES DAILY
Qty: 10 TABLET | Refills: 0 | Status: SHIPPED | OUTPATIENT
Start: 2025-07-03 | End: 2025-07-08

## 2025-07-03 RX ORDER — KETOROLAC TROMETHAMINE 15 MG/ML
15 INJECTION, SOLUTION INTRAMUSCULAR; INTRAVENOUS ONCE
Status: COMPLETED | OUTPATIENT
Start: 2025-07-03 | End: 2025-07-03

## 2025-07-03 RX ADMIN — KETOROLAC TROMETHAMINE 15 MG: 15 INJECTION, SOLUTION INTRAMUSCULAR; INTRAVENOUS at 12:38

## 2025-07-03 NOTE — ED PROVIDER NOTES
Time: 11:34 AM EDT  Date of encounter:  7/3/2025  Independent Historian/Clinical History and Information was obtained by:   Patient    History is limited by: N/A    Chief Complaint: Abdominal pain      History of Present Illness:  Patient is a 39 y.o. year old female who presents to the emergency department for evaluation of lower abdominal pain and cramping.  Patient also reports she has menstrual bleeding ongoing for 2 days.  Denies any bleeding having of saturate 1 pad per hour or passing large clots.  Denies any vomiting, diarrhea but does report intermittent nausea.  Denies any fevers, chills, body aches.      Patient Care Team  Primary Care Provider: Alyssa King MD    Past Medical History:     Allergies   Allergen Reactions    Amoxicillin Swelling and Rash    Morphine Hives    Nicoderm [Nicotine] Rash     Nicotine patches    Penicillin G Swelling    Penicillin V Swelling     Past Medical History:   Diagnosis Date    Anxiety     Dysthymia 09/05/2023    GERD (gastroesophageal reflux disease)     Hormone imbalance     Iron deficiency 09/05/2023    Mass of left breast     Migraine without aura, not refractory 09/05/2023    Pneumonia      Past Surgical History:   Procedure Laterality Date    APPENDECTOMY      BREAST LUMPECTOMY Left 9/20/2022    Procedure: Excision of left breast mass;  Surgeon: Savana Gaitan MD;  Location: [facility] OR [facility];  Service: General;  Laterality: Left;    COLONOSCOPY N/A 7/20/2023    Procedure: COLONOSCOPY WITH BIOPSIES;  Surgeon: Jelly Borrero MD;  Location: [facility] ENDOSCOPY;  Service: Gastroenterology;  Laterality: N/A;  DIVERTICULOSIS    ENDOSCOPY N/A 7/20/2023    Procedure: ESOPHAGOGASTRODUODENOSCOPY WITH BIOPSIES;  Surgeon: Jelly Borrero MD;  Location: [facility] ENDOSCOPY;  Service: Gastroenterology;  Laterality: N/A;  HIATAL HERNIA, GASTRITIS, ESOPHAGEAL NODULE    TONSILLECTOMY       Family History   Problem Relation Age of Onset    Liver cancer Mother 50         started in gallbladder, they removed that and it went to liver    Colon cancer Paternal Grandfather     Diabetes Maternal Grandmother     Diabetes Maternal Uncle     [place]ay Hyperthermia Neg Hx     Breast cancer Neg Hx     Ovarian cancer Neg Hx     Uterine cancer Neg Hx     Melanoma Neg Hx     Clotting disorder Neg Hx     Deep vein thrombosis Neg Hx     Pulmonary embolism Neg Hx        Home Medications:  Prior to Admission medications    Medication Sig Start Date End Date Taking? Authorizing Provider   albuterol sulfate  (90 Base) MCG/ACT inhaler Inhale 2 puffs Every 4 (Four) Hours As Needed for Wheezing. 10/9/24   Matthew Onofre DO   diclofenac (VOLTAREN) 75 MG EC tablet Take 1 tablet by mouth 2 (Two) Times a Day for 30 days. 6/11/25 7/11/25  Lorenzo Toure DPM   EPINEPHrine (EPIPEN) 0.3 MG/0.3ML solution auto-injector injection ADMINISTER 0.3 ML IN THE MUSCLE 1 TIME FOR 1 DOSE AS DIRECTED BY PRESCRIBER 6/19/24   Provider, MD Anthnoy   fluticasone (FLONASE) 50 MCG/ACT nasal spray Administer 2 sprays into the nostril(s) as directed by provider Daily. 12/19/24   Alyssa King MD   meloxicam (MOBIC) 15 MG tablet Take 1 tablet by mouth Daily. 4/24/25   William Erickson MD   methylPREDNISolone (MEDROL) 4 MG dose pack Take as directed 6/11/25   Lorenzo Toure DPM   pantoprazole (PROTONIX) 40 MG EC tablet TAKE 1 TABLET BY MOUTH DAILY 6/5/25   Alyssa King MD   vitamin B-12 (CYANOCOBALAMIN) 1000 MCG tablet Take 1 tablet by mouth Daily. 12/19/24   Alyssa King MD   vitamin D (ERGOCALCIFEROL) 1.25 MG (21118 UT) capsule capsule TAKE 1 CAPSULE BY MOUTH 1 TIME EVERY WEEK FOR 12 DOSES 10/4/24   Alyssa King MD        Social History:   Social History     Tobacco Use    Smoking status: Every Day     Current packs/day: 0.25     Average packs/day: 0.3 packs/day for 20.0 years (5.0 ttl pk-yrs)     Types: Cigarettes    Smokeless tobacco: Never    Tobacco comments:      "Down to 1 pack a week   Vaping Use    Vaping status: Former   Substance Use Topics    Alcohol use: Yes     Comment: rarely    Drug use: Yes     Types: Marijuana         Review of Systems:  Review of Systems   Constitutional:  Negative for chills, fatigue and fever.   HENT:  Negative for ear pain, rhinorrhea and sore throat.    Eyes:  Negative for visual disturbance.   Respiratory:  Negative for cough and shortness of breath.    Cardiovascular:  Negative for chest pain.   Gastrointestinal:  Positive for abdominal pain and nausea. Negative for diarrhea and vomiting.   Genitourinary:  Positive for vaginal bleeding. Negative for difficulty urinating.   Musculoskeletal:  Negative for arthralgias, back pain and myalgias.   Skin:  Negative for rash.   Neurological:  Negative for light-headedness and headaches.   Hematological:  Negative for adenopathy.   Psychiatric/Behavioral: Negative.          Physical Exam:  /65   Pulse 70   Temp 97.3 °F (36.3 °C) (Oral)   Resp 18   Ht 165.1 cm (65\")   Wt 71.1 kg (156 lb 12 oz)   SpO2 100%   BMI 26.08 kg/m²     Physical Exam  Vitals and nursing note reviewed.   Constitutional:       General: She is not in acute distress.     Appearance: Normal appearance. She is not toxic-appearing.   HENT:      Head: Normocephalic and atraumatic.      Nose: Nose normal.      Mouth/Throat:      Mouth: Mucous membranes are moist.   Eyes:      Conjunctiva/sclera: Conjunctivae normal.   Cardiovascular:      Rate and Rhythm: Normal rate.      Pulses: Normal pulses.      Heart sounds: Normal heart sounds.   Pulmonary:      Effort: Pulmonary effort is normal.      Breath sounds: Normal breath sounds.   Abdominal:      General: Bowel sounds are normal.      Palpations: Abdomen is soft.      Tenderness: There is no abdominal tenderness. There is no right CVA tenderness or left CVA tenderness.   Musculoskeletal:         General: Normal range of motion.      Cervical back: Normal range of motion. "   Skin:     General: Skin is warm and dry.   Neurological:      General: No focal deficit present.      Mental Status: She is alert and oriented to person, place, and time.   Psychiatric:         Mood and Affect: Mood normal.         Behavior: Behavior normal.         Thought Content: Thought content normal.         Judgment: Judgment normal.                    Medical Decision Making:      Comorbidities that affect care:    None    External Notes reviewed:    None      The following orders were placed and all results were independently analyzed by me:  Orders Placed This Encounter   Procedures    Urine Culture - Urine,    [place] Draw    Comprehensive Metabolic Panel    Lipase    Urinalysis With Microscopic If Indicated (No Culture) - Urine, Clean Catch    hCG, Quantitative, Pregnancy    CBC Auto Differential    Urinalysis, Microscopic Only - Urine, Clean Catch    CBC & Differential    Green Top (Gel)    Lavender Top    Gold Top - SST    Light Blue Top       Medications Given in the Emergency Department:  Medications   ketorolac (TORADOL) injection 15 mg (15 mg Intravenous Given 7/3/25 1238)        ED Course:         Labs:    Lab Results (last 24 hours)       Procedure Component Value Units Date/Time    CBC & Differential [[id number]]  (Abnormal) Collected: 07/03/25 1040    Specimen: Blood Updated: 07/03/25 1046    Narrative:      The following orders were created for panel order CBC & Differential.  Procedure                               Abnormality         Status                     ---------                               -----------         ------                     CBC Auto Differential[[id number]]        Abnormal            Final result                 Please view results for these tests on the individual orders.    Comprehensive Metabolic Panel [[id number]]  (Abnormal) Collected: 07/03/25 1040    Specimen: Blood Updated: 07/03/25 1103     Glucose 141 mg/dL      BUN 4.3 mg/dL      Creatinine 0.83 mg/dL       Sodium 137 mmol/L      Potassium 3.7 mmol/L      Chloride 104 mmol/L      CO2 20.2 mmol/L      Calcium 8.8 mg/dL      Total Protein 7.3 g/dL      Albumin 4.5 g/dL      ALT (SGPT) 14 U/L      AST (SGOT) 12 U/L      Alkaline Phosphatase 61 U/L      Total Bilirubin 0.4 mg/dL      Globulin 2.8 gm/dL      A/G Ratio 1.6 g/dL      BUN/Creatinine Ratio 5.2     Anion Gap 12.8 mmol/L      eGFR 92.7 mL/min/1.73     Narrative:      GFR Categories in Chronic Kidney Disease (CKD)              GFR Category          GFR (mL/min/1.73)    Interpretation  G1                    90 or greater        Normal or high (1)  G2                    60-89                Mild decrease (1)  G3a                   45-59                Mild to moderate decrease  G3b                   30-44                Moderate to severe decrease  G4                    15-29                Severe decrease  G5                    14 or less           Kidney failure    (1)In the absence of evidence of kidney disease, neither GFR category G1 or G2 fulfill the criteria for CKD.    eGFR calculation 2021 CKD-EPI creatinine equation, which does not include race as a factor    Lipase [[id number]]  (Normal) Collected: 07/03/25 1040    Specimen: Blood Updated: 07/03/25 1103     Lipase 26 U/L     hCG, Quantitative, Pregnancy [[id number]] Collected: 07/03/25 1040    Specimen: Blood Updated: 07/03/25 1101     HCG Quantitative <0.50 mIU/mL     Narrative:      HCG Ranges by Gestational Age    Females - non-pregnant premenopausal   </= 1mIU/mL HCG  Females - postmenopausal               </= 7mIU/mL HCG    3 Weeks       5.4   -      72 mIU/mL  4 Weeks      10.2   -     708 mIU/mL  5 Weeks       217   -   8,245 mIU/mL  6 Weeks       152   -  32,177 mIU/mL  7 Weeks     4,059   - 153,767 mIU/mL  8 Weeks    31,366   - 149,094 mIU/mL  9 Weeks    59,109   - 135,901 mIU/mL  10 Weeks   44,186   - 170,409 mIU/mL  12 Weeks   27,107   - 201,615 mIU/mL  14 Weeks   24,302   -  93,646 mIU/mL  15  Weeks   12,540   -  69,747 mIU/mL  16 Weeks    8,904   -  55,332 mIU/mL  17 Weeks    8,240   -  51,793 mIU/mL  18 Weeks    9,649   -  55,271 mIU/mL      CBC Auto Differential [[id number]]  (Abnormal) Collected: 07/03/25 1040    Specimen: Blood Updated: 07/03/25 1046     WBC 8.25 10*3/mm3      RBC 4.55 10*6/mm3      Hemoglobin 14.2 g/dL      Hematocrit 43.7 %      MCV 96.0 fL      MCH 31.2 pg      MCHC 32.5 g/dL      RDW 12.4 %      RDW-SD 43.9 fl      MPV 10.7 fL      Platelets 242 10*3/mm3      Neutrophil % 47.9 %      Lymphocyte % 36.0 %      Monocyte % 9.2 %      Eosinophil % 5.6 %      Basophil % 1.1 %      Immature Grans % 0.2 %      Neutrophils, Absolute 3.95 10*3/mm3      Lymphocytes, Absolute 2.97 10*3/mm3      Monocytes, Absolute 0.76 10*3/mm3      Eosinophils, Absolute 0.46 10*3/mm3      Basophils, Absolute 0.09 10*3/mm3      Immature Grans, Absolute 0.02 10*3/mm3      nRBC 0.0 /100 WBC     Urinalysis With Microscopic If Indicated (No Culture) - Urine, Clean Catch [[id number]]  (Abnormal) Collected: 07/03/25 1041    Specimen: Urine, Clean Catch Updated: 07/03/25 1113     Color, UA Red     Appearance, UA Cloudy     pH, UA 5.5     Specific Gravity, UA 1.025     Glucose, UA Negative     Ketones, UA Trace     Bilirubin, UA Negative     Blood, UA Large (3+)     Protein,  mg/dL (2+)     Leuk Esterase, UA Trace     Nitrite, UA Positive     Urobilinogen, UA 1.0 E.U./dL    Urinalysis, Microscopic Only - Urine, Clean Catch [[id number]]  (Abnormal) Collected: 07/03/25 1041    Specimen: Urine, Clean Catch Updated: 07/03/25 1119     RBC, UA Too Numerous to Count /HPF      WBC, UA 21-50 /HPF      Bacteria, UA 1+ /HPF      Squamous Epithelial Cells, UA 3-6 /HPF      Hyaline Casts, UA None Seen /LPF      Methodology Automated Microscopy    Urine Culture - Urine, Urine, Clean Catch [[id number]] Collected: 07/03/25 1041    Specimen: Urine, Clean Catch Updated: 07/03/25 1143             Imaging:    No Radiology Exams  Resulted Within Past 24 Hours      Differential Diagnosis and Discussion:    Abdominal Pain: Based on the patient's signs and symptoms, I considered abdominal aortic aneurysm, small bowel obstruction, pancreatitis, acute cholecystitis, acute appendecitis, peptic ulcer disease, gastritis, colitis, endocrine disorders, irritable bowel syndrome and other differential diagnosis an etiology of the patient's abdominal pain.    PROCEDURES:    Labs were collected in the emergency department and all labs were reviewed and interpreted by me.    No orders to display       Procedures    MDM                     Patient Care Considerations:    SEPSIS was considered but is NOT present in the emergency department as SIRS criteria is not present.      Consultants/Shared Management Plan:    SHARED VISIT: I have discussed the case with my supervising physician, Dr. Horne who states he agrees with treatment plan. The substantive portion of the medical decision was made by the attesting physician who made or approve the management plan and will take responsibility for the patient.  Clinical findings were discussed and ultimate disposition was made in consult with supervising physician.    Social Determinants of Health:    Patient is independent, reliable, and has access to care.       Disposition and Care Coordination:    Discharged: The patient is suitable and stable for discharge with no need for consideration of admission.    I have explained the patient´s condition, diagnoses and treatment plan based on the information available to me at this time. I have answered questions and addressed any concerns. The patient has a good  understanding of the patient´s diagnosis, condition, and treatment plan as can be expected at this point. The vital signs have been stable. The patient´s condition is stable and appropriate for discharge from the emergency department.      The patient will pursue further outpatient evaluation with the primary  care physician or other designated or consulting physician as outlined in the discharge instructions. They are agreeable to this plan of care and follow-up instructions have been explained in detail. The patient has received these instructions in written format and has expressed an understanding of the discharge instructions. The patient is aware that any significant change in condition or worsening of symptoms should prompt an immediate return to this or the closest emergency department or call to 911.  I have explained discharge medications and the need for follow up with the patient/caretakers. This was also printed in the discharge instructions. Patient was discharged with the following medications and follow up:      Medication List        New Prescriptions      sulfamethoxazole-trimethoprim 800-160 MG per tablet  Commonly known as: BACTRIM DS,SEPTRA DS  Take 1 tablet by mouth 2 (Two) Times a Day for 5 days.               Where to Get Your Medications        These medications were sent to [id number] - [place], [place] - [address] AT [facility] OF [address]/[facility] & [place] - [phone] [facility] [phone]   [address] [facility] [address]      Phone: [phone]   sulfamethoxazole-trimethoprim 800-160 MG per tablet      Alyssa King MD  [address]  [place] [address] [phone]    Go to   As needed       Final diagnoses:   Urinary tract infection with hematuria, site unspecified        ED Disposition       ED Disposition   Discharge    Condition   Stable    Comment   --               This medical record created using voice recognition software.             Edna Fritz, APRN  07/03/25 2462

## 2025-07-03 NOTE — ED PROVIDER NOTES
"SHARED VISIT ATTESTATION:    This visit was performed by myself and an APC.  I personally approved the management plan/medical decision making and take responsibility for the patient management.      SHARED VISIT NOTE:    Patient is 39 y.o. year old female that presents to the ED for evaluation of lower abdominal pain.     Physical Exam    ED Course:    /65   Pulse 70   Temp 97.3 °F (36.3 °C) (Oral)   Resp 18   Ht 165.1 cm (65\")   Wt 71.1 kg (156 lb 12 oz)   SpO2 100%   BMI 26.08 kg/m²       The following orders were placed and all results were independently analyzed by me:  Orders Placed This Encounter   Procedures    Urine Culture - Urine,    [place] Draw    Comprehensive Metabolic Panel    Lipase    Urinalysis With Microscopic If Indicated (No Culture) - Urine, Clean Catch    hCG, Quantitative, Pregnancy    CBC Auto Differential    Urinalysis, Microscopic Only - Urine, Clean Catch    CBC & Differential    Green Top (Gel)    Lavender Top    Gold Top - SST    Light Blue Top       Medications Given in the Emergency Department:  Medications   ketorolac (TORADOL) injection 15 mg (15 mg Intravenous Given 7/3/25 1238)        ED Course:         Labs:    Lab Results (last 24 hours)       Procedure Component Value Units Date/Time    CBC & Differential [[id number]]  (Abnormal) Collected: 07/03/25 1040    Specimen: Blood Updated: 07/03/25 1046    Narrative:      The following orders were created for panel order CBC & Differential.  Procedure                               Abnormality         Status                     ---------                               -----------         ------                     CBC Auto Differential[[id number]]        Abnormal            Final result                 Please view results for these tests on the individual orders.    Comprehensive Metabolic Panel [[id number]]  (Abnormal) Collected: 07/03/25 1040    Specimen: Blood Updated: 07/03/25 1103     Glucose 141 mg/dL      BUN 4.3 mg/dL  "     Creatinine 0.83 mg/dL      Sodium 137 mmol/L      Potassium 3.7 mmol/L      Chloride 104 mmol/L      CO2 20.2 mmol/L      Calcium 8.8 mg/dL      Total Protein 7.3 g/dL      Albumin 4.5 g/dL      ALT (SGPT) 14 U/L      AST (SGOT) 12 U/L      Alkaline Phosphatase 61 U/L      Total Bilirubin 0.4 mg/dL      Globulin 2.8 gm/dL      A/G Ratio 1.6 g/dL      BUN/Creatinine Ratio 5.2     Anion Gap 12.8 mmol/L      eGFR 92.7 mL/min/1.73     Narrative:      GFR Categories in Chronic Kidney Disease (CKD)              GFR Category          GFR (mL/min/1.73)    Interpretation  G1                    90 or greater        Normal or high (1)  G2                    60-89                Mild decrease (1)  G3a                   45-59                Mild to moderate decrease  G3b                   30-44                Moderate to severe decrease  G4                    15-29                Severe decrease  G5                    14 or less           Kidney failure    (1)In the absence of evidence of kidney disease, neither GFR category G1 or G2 fulfill the criteria for CKD.    eGFR calculation 2021 CKD-EPI creatinine equation, which does not include race as a factor    Lipase [[id number]]  (Normal) Collected: 07/03/25 1040    Specimen: Blood Updated: 07/03/25 1103     Lipase 26 U/L     hCG, Quantitative, Pregnancy [[id number]] Collected: 07/03/25 1040    Specimen: Blood Updated: 07/03/25 1101     HCG Quantitative <0.50 mIU/mL     Narrative:      HCG Ranges by Gestational Age    Females - non-pregnant premenopausal   </= 1mIU/mL HCG  Females - postmenopausal               </= 7mIU/mL HCG    3 Weeks       5.4   -      72 mIU/mL  4 Weeks      10.2   -     708 mIU/mL  5 Weeks       217   -   8,245 mIU/mL  6 Weeks       152   -  32,177 mIU/mL  7 Weeks     4,059   - 153,767 mIU/mL  8 Weeks    31,366   - 149,094 mIU/mL  9 Weeks    59,109   - 135,901 mIU/mL  10 Weeks   44,186   - 170,409 mIU/mL  12 Weeks   27,107   - 201,615 mIU/mL  14 Weeks    24,302   -  93,646 mIU/mL  15 Weeks   12,540   -  69,747 mIU/mL  16 Weeks    8,904   -  55,332 mIU/mL  17 Weeks    8,240   -  51,793 mIU/mL  18 Weeks    9,649   -  55,271 mIU/mL      CBC Auto Differential [[id number]]  (Abnormal) Collected: 07/03/25 1040    Specimen: Blood Updated: 07/03/25 1046     WBC 8.25 10*3/mm3      RBC 4.55 10*6/mm3      Hemoglobin 14.2 g/dL      Hematocrit 43.7 %      MCV 96.0 fL      MCH 31.2 pg      MCHC 32.5 g/dL      RDW 12.4 %      RDW-SD 43.9 fl      MPV 10.7 fL      Platelets 242 10*3/mm3      Neutrophil % 47.9 %      Lymphocyte % 36.0 %      Monocyte % 9.2 %      Eosinophil % 5.6 %      Basophil % 1.1 %      Immature Grans % 0.2 %      Neutrophils, Absolute 3.95 10*3/mm3      Lymphocytes, Absolute 2.97 10*3/mm3      Monocytes, Absolute 0.76 10*3/mm3      Eosinophils, Absolute 0.46 10*3/mm3      Basophils, Absolute 0.09 10*3/mm3      Immature Grans, Absolute 0.02 10*3/mm3      nRBC 0.0 /100 WBC     Urinalysis With Microscopic If Indicated (No Culture) - Urine, Clean Catch [[id number]]  (Abnormal) Collected: 07/03/25 1041    Specimen: Urine, Clean Catch Updated: 07/03/25 1113     Color, UA Red     Appearance, UA Cloudy     pH, UA 5.5     Specific Gravity, UA 1.025     Glucose, UA Negative     Ketones, UA Trace     Bilirubin, UA Negative     Blood, UA Large (3+)     Protein,  mg/dL (2+)     Leuk Esterase, UA Trace     Nitrite, UA Positive     Urobilinogen, UA 1.0 E.U./dL    Urinalysis, Microscopic Only - Urine, Clean Catch [[id number]]  (Abnormal) Collected: 07/03/25 1041    Specimen: Urine, Clean Catch Updated: 07/03/25 1119     RBC, UA Too Numerous to Count /HPF      WBC, UA 21-50 /HPF      Bacteria, UA 1+ /HPF      Squamous Epithelial Cells, UA 3-6 /HPF      Hyaline Casts, UA None Seen /LPF      Methodology Automated Microscopy    Urine Culture - Urine, Urine, Clean Catch [[id number]] Collected: 07/03/25 1041    Specimen: Urine, Clean Catch Updated: 07/03/25 1143              Imaging:    No Radiology Exams Resulted Within Past 24 Hours    MDM:    Procedures    Labs were collected in the emergency department and all labs were reviewed and interpreted by me.                     Jensen Horne MD  19:19 EDT  07/03/25         Jensen Horne MD  07/03/25 5059

## 2025-07-03 NOTE — DISCHARGE INSTRUCTIONS
Please follow with your primary care provider in 5 to 7 days for reevaluation.  Return to the ED for any new or worsening concerning symptoms.  You have been prescribed antibiotics for a UTI to  at your pharmacy today and begin taking.  Your urine culture is still pending and someone from this department will call you in 2 to 3 days if you need a change in your antibiotics.

## 2025-07-04 LAB — BACTERIA SPEC AEROBE CULT: NORMAL

## 2025-07-04 RX ORDER — PHENAZOPYRIDINE HYDROCHLORIDE 100 MG/1
100 TABLET, FILM COATED ORAL 3 TIMES DAILY PRN
Qty: 6 TABLET | Refills: 0 | Status: SHIPPED | OUTPATIENT
Start: 2025-07-04 | End: 2025-07-06

## 2025-07-09 ENCOUNTER — HOSPITAL ENCOUNTER (EMERGENCY)
Facility: HOSPITAL | Age: 40
Discharge: HOME | End: 2025-07-09
Attending: EMERGENCY MEDICINE | Admitting: EMERGENCY MEDICINE
Payer: COMMERCIAL

## 2025-07-09 ENCOUNTER — APPOINTMENT (OUTPATIENT)
Dept: CT IMAGING | Facility: HOSPITAL | Age: 40
End: 2025-07-09
Payer: COMMERCIAL

## 2025-07-09 VITALS
OXYGEN SATURATION: 100 % | SYSTOLIC BLOOD PRESSURE: 116 MMHG | WEIGHT: 155.65 LBS | RESPIRATION RATE: 18 BRPM | HEIGHT: 65 IN | TEMPERATURE: 97.7 F | DIASTOLIC BLOOD PRESSURE: 75 MMHG | HEART RATE: 84 BPM | BODY MASS INDEX: 25.93 KG/M2

## 2025-07-09 DIAGNOSIS — S39.012A STRAIN OF LUMBAR REGION, INITIAL ENCOUNTER: Primary | ICD-10-CM

## 2025-07-09 LAB
ALBUMIN SERPL-MCNC: 4.4 G/DL (ref 3.5–5.2)
ALBUMIN/GLOB SERPL: 1.9 G/DL
ALP SERPL-CCNC: 51 U/L (ref 39–117)
ALT SERPL W P-5'-P-CCNC: 14 U/L (ref 1–33)
ANION GAP SERPL CALCULATED.3IONS-SCNC: 9.8 MMOL/L (ref 5–15)
AST SERPL-CCNC: 14 U/L (ref 1–32)
BASOPHILS # BLD AUTO: 0.07 10*3/MM3 (ref 0–0.2)
BASOPHILS NFR BLD AUTO: 1 % (ref 0–1.5)
BILIRUB SERPL-MCNC: 0.8 MG/DL (ref 0–1.2)
BILIRUB UR QL STRIP: NEGATIVE
BUN SERPL-MCNC: 6.7 MG/DL (ref 6–20)
BUN/CREAT SERPL: 7.3 (ref 7–25)
CALCIUM SPEC-SCNC: 9.2 MG/DL (ref 8.6–10.5)
CHLORIDE SERPL-SCNC: 104 MMOL/L (ref 98–107)
CLARITY UR: CLEAR
CO2 SERPL-SCNC: 23.2 MMOL/L (ref 22–29)
COLOR UR: YELLOW
CREAT SERPL-MCNC: 0.92 MG/DL (ref 0.57–1)
DEPRECATED RDW RBC AUTO: 43.6 FL (ref 37–54)
EGFRCR SERPLBLD CKD-EPI 2021: 81.9 ML/MIN/1.73
EOSINOPHIL # BLD AUTO: 0.32 10*3/MM3 (ref 0–0.4)
EOSINOPHIL NFR BLD AUTO: 4.7 % (ref 0.3–6.2)
ERYTHROCYTE [DISTWIDTH] IN BLOOD BY AUTOMATED COUNT: 12.3 % (ref 12.3–15.4)
GLOBULIN UR ELPH-MCNC: 2.3 GM/DL
GLUCOSE SERPL-MCNC: 110 MG/DL (ref 65–99)
GLUCOSE UR STRIP-MCNC: NEGATIVE MG/DL
HCG INTACT+B SERPL-ACNC: <0.5 MIU/ML
HCT VFR BLD AUTO: 38.6 % (ref 34–46.6)
HGB BLD-MCNC: 12.5 G/DL (ref 12–15.9)
HGB UR QL STRIP.AUTO: NEGATIVE
IMM GRANULOCYTES # BLD AUTO: 0.01 10*3/MM3 (ref 0–0.05)
IMM GRANULOCYTES NFR BLD AUTO: 0.1 % (ref 0–0.5)
KETONES UR QL STRIP: NEGATIVE
LEUKOCYTE ESTERASE UR QL STRIP.AUTO: NEGATIVE
LIPASE SERPL-CCNC: 26 U/L (ref 13–60)
LYMPHOCYTES # BLD AUTO: 2.49 10*3/MM3 (ref 0.7–3.1)
LYMPHOCYTES NFR BLD AUTO: 36.7 % (ref 19.6–45.3)
MCH RBC QN AUTO: 31 PG (ref 26.6–33)
MCHC RBC AUTO-ENTMCNC: 32.4 G/DL (ref 31.5–35.7)
MCV RBC AUTO: 95.8 FL (ref 79–97)
MONOCYTES # BLD AUTO: 0.72 10*3/MM3 (ref 0.1–0.9)
MONOCYTES NFR BLD AUTO: 10.6 % (ref 5–12)
NEUTROPHILS NFR BLD AUTO: 3.18 10*3/MM3 (ref 1.7–7)
NEUTROPHILS NFR BLD AUTO: 46.9 % (ref 42.7–76)
NITRITE UR QL STRIP: NEGATIVE
NRBC BLD AUTO-RTO: 0 /100 WBC (ref 0–0.2)
PH UR STRIP.AUTO: 6.5 [PH] (ref 5–8)
PLATELET # BLD AUTO: 229 10*3/MM3 (ref 140–450)
PMV BLD AUTO: 10.2 FL (ref 6–12)
POTASSIUM SERPL-SCNC: 4 MMOL/L (ref 3.5–5.2)
PROT SERPL-MCNC: 6.7 G/DL (ref 6–8.5)
PROT UR QL STRIP: NEGATIVE
RBC # BLD AUTO: 4.03 10*6/MM3 (ref 3.77–5.28)
SODIUM SERPL-SCNC: 137 MMOL/L (ref 136–145)
SP GR UR STRIP: 1.01 (ref 1–1.03)
UROBILINOGEN UR QL STRIP: NORMAL
WBC NRBC COR # BLD AUTO: 6.79 10*3/MM3 (ref 3.4–10.8)

## 2025-07-09 PROCEDURE — 85025 COMPLETE CBC W/AUTO DIFF WBC: CPT

## 2025-07-09 PROCEDURE — 25010000002 KETOROLAC TROMETHAMINE PER 15 MG

## 2025-07-09 PROCEDURE — 99284 EMERGENCY DEPT VISIT MOD MDM: CPT

## 2025-07-09 PROCEDURE — 74176 CT ABD & PELVIS W/O CONTRAST: CPT

## 2025-07-09 PROCEDURE — 83690 ASSAY OF LIPASE: CPT

## 2025-07-09 PROCEDURE — 84702 CHORIONIC GONADOTROPIN TEST: CPT

## 2025-07-09 PROCEDURE — 81003 URINALYSIS AUTO W/O SCOPE: CPT

## 2025-07-09 PROCEDURE — 96374 THER/PROPH/DIAG INJ IV PUSH: CPT

## 2025-07-09 PROCEDURE — 80053 COMPREHEN METABOLIC PANEL: CPT

## 2025-07-09 RX ORDER — KETOROLAC TROMETHAMINE 15 MG/ML
15 INJECTION, SOLUTION INTRAMUSCULAR; INTRAVENOUS ONCE
Status: COMPLETED | OUTPATIENT
Start: 2025-07-09 | End: 2025-07-09

## 2025-07-09 RX ORDER — METHOCARBAMOL 500 MG/1
500 TABLET, FILM COATED ORAL 4 TIMES DAILY PRN
Qty: 40 TABLET | Refills: 0 | Status: SHIPPED | OUTPATIENT
Start: 2025-07-09

## 2025-07-09 RX ORDER — KETOROLAC TROMETHAMINE 30 MG/ML
30 INJECTION, SOLUTION INTRAMUSCULAR; INTRAVENOUS ONCE
Status: DISCONTINUED | OUTPATIENT
Start: 2025-07-09 | End: 2025-07-09

## 2025-07-09 RX ORDER — METHYLPREDNISOLONE 4 MG/1
TABLET ORAL
Qty: 21 TABLET | Refills: 0 | Status: SHIPPED | OUTPATIENT
Start: 2025-07-09 | End: 2025-07-15

## 2025-07-09 RX ORDER — LIDOCAINE 50 MG/G
1 PATCH TOPICAL EVERY 24 HOURS
Qty: 7 PATCH | Refills: 0 | Status: SHIPPED | OUTPATIENT
Start: 2025-07-09 | End: 2025-07-16

## 2025-07-09 RX ADMIN — KETOROLAC TROMETHAMINE 15 MG: 15 INJECTION, SOLUTION INTRAMUSCULAR; INTRAVENOUS at 14:37

## 2025-07-09 NOTE — ED PROVIDER NOTES
"SHARED VISIT ATTESTATION:    This visit was performed by myself and an APC.  I personally approved the management plan/medical decision making and take responsibility for the patient management.      SHARED VISIT NOTE:    Patient is 39 y.o. year old female that presents to the ED for evaluation of bilateral flank pain.  The patient was recently treated for urinary tract infection..     Physical Exam  Vitals and nursing note reviewed.   Constitutional:       General: She is not in acute distress.     Appearance: Normal appearance. She is not toxic-appearing.   HENT:      Head: Normocephalic and atraumatic.      Mouth/Throat:      Mouth: Mucous membranes are moist.   Eyes:      General: No scleral icterus.  Cardiovascular:      Rate and Rhythm: Normal rate and regular rhythm.      Pulses: Normal pulses.      Heart sounds: Normal heart sounds.   Pulmonary:      Effort: Pulmonary effort is normal. No respiratory distress.      Breath sounds: Normal breath sounds.   Abdominal:      General: Abdomen is flat.      Palpations: Abdomen is soft.      Tenderness: There is no abdominal tenderness.   Musculoskeletal:         General: Normal range of motion.      Cervical back: Normal range of motion and neck supple.   Skin:     General: Skin is warm and dry.   Neurological:      Mental Status: She is alert and oriented to person, place, and time. Mental status is at baseline.      Sensory: Sensory deficit present.      Motor: Weakness present.      Comments: Ptosis to the left eye         ED Course:    /75 (BP Location: Left arm, Patient Position: Sitting)   Pulse 84   Temp 97.7 °F (36.5 °C) (Oral)   Resp 18   Ht 165.1 cm (65\")   Wt 70.6 kg (155 lb 10.3 oz)   LMP  (LMP Unknown)   SpO2 100%   BMI 25.90 kg/m²       The following orders were placed and all results were independently analyzed by me:  Orders Placed This Encounter   Procedures    CT Abdomen Pelvis Stone Protocol    Comprehensive Metabolic Panel    Lipase "    hCG, Quantitative, Pregnancy    Urinalysis With Microscopic If Indicated (No Culture) - Urine, Clean Catch    CBC Auto Differential    CBC & Differential       Medications Given in the Emergency Department:  Medications   ketorolac (TORADOL) injection 15 mg (15 mg Intravenous Given 7/9/25 1437)        ED Course:    ED Course as of 07/09/25 1841   Wed Jul 09, 2025   1452 CBC & Differential  The patient´s CBC that was reviewed and interpreted by me shows no abnormalities of critical concern. Of note, there is no anemia requiring a blood transfusion and the platelet count is acceptable. [AS]   1452 Urinalysis With Microscopic If Indicated (No Culture) - Urine, Clean Catch  No signs of urinary tract infection [AS]   1500 Comprehensive Metabolic Panel(!)  The patient´s CMP that was reviewed and interpretted by me shows no abnormalities of critical concern. Of note, the patient´s sodium and potassium are acceptable. The patient´s liver enzymes are unremarkable. The patient´s renal function (creatinine) is preserved. The patient has a normal anion gap. [AS]   1500 Lipase: 26  WNL [AS]   1500 HCG Quantitative: <0.50 [AS]      ED Course User Index  [AS] Kristin Solorio PA-C       Labs:    Lab Results (last 24 hours)       Procedure Component Value Units Date/Time    CBC & Differential [[id number]]  (Normal) Collected: 07/09/25 1431    Specimen: Blood Updated: 07/09/25 1439    Narrative:      The following orders were created for panel order CBC & Differential.  Procedure                               Abnormality         Status                     ---------                               -----------         ------                     CBC Auto Differential[[id number]]        Normal              Final result                 Please view results for these tests on the individual orders.    Comprehensive Metabolic Panel [[id number]]  (Abnormal) Collected: 07/09/25 1431    Specimen: Blood Updated: 07/09/25 1459     Glucose 110  mg/dL      BUN 6.7 mg/dL      Creatinine 0.92 mg/dL      Sodium 137 mmol/L      Potassium 4.0 mmol/L      Chloride 104 mmol/L      CO2 23.2 mmol/L      Calcium 9.2 mg/dL      Total Protein 6.7 g/dL      Albumin 4.4 g/dL      ALT (SGPT) 14 U/L      AST (SGOT) 14 U/L      Alkaline Phosphatase 51 U/L      Total Bilirubin 0.8 mg/dL      Globulin 2.3 gm/dL      A/G Ratio 1.9 g/dL      BUN/Creatinine Ratio 7.3     Anion Gap 9.8 mmol/L      eGFR 81.9 mL/min/1.73     Narrative:      GFR Categories in Chronic Kidney Disease (CKD)              GFR Category          GFR (mL/min/1.73)    Interpretation  G1                    90 or greater        Normal or high (1)  G2                    60-89                Mild decrease (1)  G3a                   45-59                Mild to moderate decrease  G3b                   30-44                Moderate to severe decrease  G4                    15-29                Severe decrease  G5                    14 or less           Kidney failure    (1)In the absence of evidence of kidney disease, neither GFR category G1 or G2 fulfill the criteria for CKD.    eGFR calculation 2021 CKD-EPI creatinine equation, which does not include race as a factor    Lipase [[id number]]  (Normal) Collected: 07/09/25 1431    Specimen: Blood Updated: 07/09/25 1459     Lipase 26 U/L     hCG, Quantitative, Pregnancy [[id number]] Collected: 07/09/25 1431    Specimen: Blood Updated: 07/09/25 1456     HCG Quantitative <0.50 mIU/mL     Narrative:      HCG Ranges by Gestational Age    Females - non-pregnant premenopausal   </= 1mIU/mL HCG  Females - postmenopausal               </= 7mIU/mL HCG    3 Weeks       5.4   -      72 mIU/mL  4 Weeks      10.2   -     708 mIU/mL  5 Weeks       217   -   8,245 mIU/mL  6 Weeks       152   -  32,177 mIU/mL  7 Weeks     4,059   - 153,767 mIU/mL  8 Weeks    31,366   - 149,094 mIU/mL  9 Weeks    59,109   - 135,901 mIU/mL  10 Weeks   44,186   - 170,409 mIU/mL  12 Weeks   27,107   -  201,615 mIU/mL  14 Weeks   24,302   -  93,646 mIU/mL  15 Weeks   12,540   -  69,747 mIU/mL  16 Weeks    8,904   -  55,332 mIU/mL  17 Weeks    8,240   -  51,793 mIU/mL  18 Weeks    9,649   -  55,271 mIU/mL      CBC Auto Differential [[id number]]  (Normal) Collected: 07/09/25 1431    Specimen: Blood Updated: 07/09/25 1439     WBC 6.79 10*3/mm3      RBC 4.03 10*6/mm3      Hemoglobin 12.5 g/dL      Hematocrit 38.6 %      MCV 95.8 fL      MCH 31.0 pg      MCHC 32.4 g/dL      RDW 12.3 %      RDW-SD 43.6 fl      MPV 10.2 fL      Platelets 229 10*3/mm3      Neutrophil % 46.9 %      Lymphocyte % 36.7 %      Monocyte % 10.6 %      Eosinophil % 4.7 %      Basophil % 1.0 %      Immature Grans % 0.1 %      Neutrophils, Absolute 3.18 10*3/mm3      Lymphocytes, Absolute 2.49 10*3/mm3      Monocytes, Absolute 0.72 10*3/mm3      Eosinophils, Absolute 0.32 10*3/mm3      Basophils, Absolute 0.07 10*3/mm3      Immature Grans, Absolute 0.01 10*3/mm3      nRBC 0.0 /100 WBC     Urinalysis With Microscopic If Indicated (No Culture) - Urine, Clean Catch [[id number]]  (Normal) Collected: 07/09/25 1440    Specimen: Urine, Clean Catch Updated: 07/09/25 1450     Color, UA Yellow     Appearance, UA Clear     pH, UA 6.5     Specific Gravity, UA 1.006     Glucose, UA Negative     Ketones, UA Negative     Bilirubin, UA Negative     Blood, UA Negative     Protein, UA Negative     Leuk Esterase, UA Negative     Nitrite, UA Negative     Urobilinogen, UA 0.2 E.U./dL    Narrative:      Urine microscopic not indicated.             Imaging:    CT Abdomen Pelvis Stone Protocol  Result Date: 7/9/2025  CT ABDOMEN PELVIS STONE PROTOCOL Date of Exam: 7/9/2025 3:07 PM EDT Indication: Left flank pain. Comparison: CT 6/23/2020 Technique: Axial CT images were obtained of the abdomen and pelvis without the administration of contrast. Reconstructed coronal and sagittal images were also obtained. Automated exposure control and iterative construction methods were used.  Findings: There is atelectasis at the lung bases. Liver is normal. The gallbladder is normal. Spleen and pancreas demonstrate no acute finding. Adrenal glands appear normal. There is a punctate 2 mm left-sided nephrolith. No obstructive uropathy. No ureteral stone. No bladder stone. Uterus and ovaries appear normal for age. There is mild diverticulosis in the distal colon without definite diverticulitis. Moderate stool in the colon. Appendix not well visualized but no inflammatory process seen. Stomach and small bowel demonstrate no acute finding. Normal aorta. No enlarged adenopathy. No significant ascites. No acute osseous finding.     Impression: 1.Punctate 2 mm left-sided nephrolith. No obstructive uropathy or ureteral stone. 2.Diverticulosis. Electronically Signed: Juan Antonio Le MD  7/9/2025 3:42 PM EDT  Workstation ID: [id number]      MDM:    Procedures    Labs were collected in the emergency department and all labs were reviewed and interpreted by me.  CT scan was performed in the emergency department and the CT scan radiology impression was interpreted by me.                     Matthew Onofre DO  18:41 EDT  07/09/25         Matthew Onofre DO  07/09/25 [id number]

## 2025-07-09 NOTE — ED PROVIDER NOTES
Time: 1:59 PM EDT  Date of encounter:  7/9/2025  Independent Historian/Clinical History and Information was obtained by:   Patient    History is limited by: N/A    Chief Complaint: Back pain      History of Present Illness:  Patient is a 39 y.o. year old female who presents to the emergency department for evaluation of back pain.  Patient states that she was treated for a urinary tract infection last week and completed her course of Bactrim yesterday.  Patient reports that she still has pain in her low back area.  Denies fall or injury.  Reports it feels tight and burning in sensation.  She denies any dysuria hematuria fever nausea vomiting or chills.  No saddle anesthesia, gait instability urinary retention or incontinence.      Patient Care Team  Primary Care Provider: Alyssa King MD    Past Medical History:     Allergies   Allergen Reactions    Amoxicillin Swelling and Rash    Morphine Hives    Nicoderm [Nicotine] Rash     Nicotine patches    Penicillin G Swelling    Penicillin V Swelling     Past Medical History:   Diagnosis Date    Anxiety     Dysthymia 09/05/2023    GERD (gastroesophageal reflux disease)     Hormone imbalance     Iron deficiency 09/05/2023    Mass of left breast     Migraine without aura, not refractory 09/05/2023    Pneumonia      Past Surgical History:   Procedure Laterality Date    APPENDECTOMY      BREAST LUMPECTOMY Left 9/20/2022    Procedure: Excision of left breast mass;  Surgeon: Savana Gaitan MD;  Location: [facility] OR [facility];  Service: General;  Laterality: Left;    COLONOSCOPY N/A 7/20/2023    Procedure: COLONOSCOPY WITH BIOPSIES;  Surgeon: Jelly Borrero MD;  Location: [facility] ENDOSCOPY;  Service: Gastroenterology;  Laterality: N/A;  DIVERTICULOSIS    ENDOSCOPY N/A 7/20/2023    Procedure: ESOPHAGOGASTRODUODENOSCOPY WITH BIOPSIES;  Surgeon: Jelly Borrero MD;  Location: [facility] ENDOSCOPY;  Service: Gastroenterology;  Laterality: N/A;  HIATAL HERNIA,  GASTRITIS, ESOPHAGEAL NODULE    TONSILLECTOMY       Family History   Problem Relation Age of Onset    Liver cancer Mother 50        started in gallbladder, they removed that and it went to liver    Colon cancer Paternal Grandfather     Diabetes Maternal Grandmother     Diabetes Maternal Uncle     Malig Hyperthermia Neg Hx     Breast cancer Neg Hx     Ovarian cancer Neg Hx     Uterine cancer Neg Hx     Melanoma Neg Hx     Clotting disorder Neg Hx     Deep vein thrombosis Neg Hx     Pulmonary embolism Neg Hx        Home Medications:  Prior to Admission medications    Medication Sig Start Date End Date Taking? Authorizing Provider   albuterol sulfate  (90 Base) MCG/ACT inhaler Inhale 2 puffs Every 4 (Four) Hours As Needed for Wheezing. 10/9/24   Matthew Onofre DO   diclofenac (VOLTAREN) 75 MG EC tablet Take 1 tablet by mouth 2 (Two) Times a Day for 30 days. 6/11/25 7/11/25  Lorenzo Toure DPM   EPINEPHrine (EPIPEN) 0.3 MG/0.3ML solution auto-injector injection ADMINISTER 0.3 ML IN THE MUSCLE 1 TIME FOR 1 DOSE AS DIRECTED BY PRESCRIBER 6/19/24   Provider, MD Anthony   fluticasone (FLONASE) 50 MCG/ACT nasal spray Administer 2 sprays into the nostril(s) as directed by provider Daily. 12/19/24   Alyssa iKng MD   meloxicam (MOBIC) 15 MG tablet Take 1 tablet by mouth Daily. 4/24/25   William Erickson MD   methylPREDNISolone (MEDROL) 4 MG dose pack Take as directed 6/11/25   Lorenzo Toure DPM   pantoprazole (PROTONIX) 40 MG EC tablet TAKE 1 TABLET BY MOUTH DAILY 6/5/25   Alyssa King MD   sulfamethoxazole-trimethoprim (BACTRIM DS,SEPTRA DS) 800-160 MG per tablet Take 1 tablet by mouth 2 (Two) Times a Day for 5 days. 7/3/25 7/8/25  Edna Fritz, MAXINE   vitamin B-12 (CYANOCOBALAMIN) 1000 MCG tablet Take 1 tablet by mouth Daily. 12/19/24   Alyssa King MD   vitamin D (ERGOCALCIFEROL) 1.25 MG (50932 UT) capsule capsule TAKE 1 CAPSULE BY MOUTH 1 TIME EVERY WEEK FOR 12  "DOSES 10/4/24   Alyssa King MD        Social History:   Social History     Tobacco Use    Smoking status: Every Day     Current packs/day: 0.25     Average packs/day: 0.3 packs/day for 20.0 years (5.0 ttl pk-yrs)     Types: Cigarettes    Smokeless tobacco: Never    Tobacco comments:     Down to 1 pack a week   Vaping Use    Vaping status: Former   Substance Use Topics    Alcohol use: Yes     Comment: rarely    Drug use: Yes     Types: Marijuana         Review of Systems:  Review of Systems   Constitutional:  Negative for activity change, appetite change and fever.   HENT:  Negative for congestion, ear pain and sore throat.    Eyes:  Negative for photophobia and visual disturbance.   Respiratory:  Negative for cough and shortness of breath.    Cardiovascular:  Negative for chest pain, palpitations and leg swelling.   Gastrointestinal:  Negative for abdominal pain and vomiting.   Endocrine: Negative for polyuria.   Genitourinary:  Negative for difficulty urinating and dysuria.   Musculoskeletal:  Positive for back pain. Negative for arthralgias, gait problem, joint swelling, neck pain and neck stiffness.   Skin:  Negative for color change and rash.   Allergic/Immunologic: Negative for immunocompromised state.   Neurological:  Negative for dizziness.   Hematological:  Does not bruise/bleed easily.   Psychiatric/Behavioral:  Negative for agitation and confusion.         Physical Exam:  /75 (BP Location: Left arm, Patient Position: Sitting)   Pulse 84   Temp 97.7 °F (36.5 °C) (Oral)   Resp 18   Ht 165.1 cm (65\")   Wt 70.6 kg (155 lb 10.3 oz)   LMP  (LMP Unknown)   SpO2 100%   BMI 25.90 kg/m²     Physical Exam  Vitals and nursing note reviewed.   HENT:      Head: Normocephalic.      Mouth/Throat:      Mouth: Mucous membranes are moist.   Eyes:      Pupils: Pupils are equal, round, and reactive to light.   Cardiovascular:      Rate and Rhythm: Normal rate and regular rhythm.      Pulses: Normal pulses. "      Heart sounds: Normal heart sounds. No murmur heard.     No friction rub. No gallop.   Pulmonary:      Effort: Pulmonary effort is normal. No respiratory distress.      Breath sounds: No wheezing, rhonchi or rales.   Abdominal:      General: There is no distension.      Palpations: Abdomen is soft.      Tenderness: There is no abdominal tenderness. There is no guarding or rebound.   Musculoskeletal:         General: Tenderness (Bilateral lumbar musculature, no bony tenderness) present. Normal range of motion.      Cervical back: Normal range of motion and neck supple.      Right lower leg: No edema.      Left lower leg: No edema.   Skin:     General: Skin is warm and dry.      Capillary Refill: Capillary refill takes less than 2 seconds.      Comments: No rash or vesicular lesions   Neurological:      General: No focal deficit present.      Mental Status: She is alert and oriented to person, place, and time.      Cranial Nerves: No cranial nerve deficit.      Sensory: No sensory deficit.   Psychiatric:         Mood and Affect: Mood normal.         Behavior: Behavior normal.                    Medical Decision Making:      Comorbidities that affect care:    Anxiety, GERD    External Notes reviewed:    Previous ED Note: Seen in ED 7/3 for UTI. Started on Bactrim.  Urine culture from 7/3 reveals less than 25,000 normal urogenital rich.      The following orders were placed and all results were independently analyzed by me:  Orders Placed This Encounter   Procedures    CT Abdomen Pelvis Stone Protocol    Comprehensive Metabolic Panel    Lipase    hCG, Quantitative, Pregnancy    Urinalysis With Microscopic If Indicated (No Culture) - Urine, Clean Catch    CBC Auto Differential    CBC & Differential       Medications Given in the Emergency Department:  Medications   ketorolac (TORADOL) injection 15 mg (15 mg Intravenous Given 7/9/25 1437)        ED Course:    ED Course as of 07/09/25 1639   Wed Jul 09, 2025   1915  CBC & Differential  The patient´s CBC that was reviewed and interpreted by me shows no abnormalities of critical concern. Of note, there is no anemia requiring a blood transfusion and the platelet count is acceptable. [AS]   1452 Urinalysis With Microscopic If Indicated (No Culture) - Urine, Clean Catch  No signs of urinary tract infection [AS]   1500 Comprehensive Metabolic Panel(!)  The patient´s CMP that was reviewed and interpretted by me shows no abnormalities of critical concern. Of note, the patient´s sodium and potassium are acceptable. The patient´s liver enzymes are unremarkable. The patient´s renal function (creatinine) is preserved. The patient has a normal anion gap. [AS]   1500 Lipase: 26  WNL [AS]   1500 HCG Quantitative: <0.50 [AS]      ED Course User Index  [AS] Kristin Solorio PA-C       Labs:    Lab Results (last 24 hours)       Procedure Component Value Units Date/Time    CBC & Differential [[id number]]  (Normal) Collected: 07/09/25 1431    Specimen: Blood Updated: 07/09/25 1439    Narrative:      The following orders were created for panel order CBC & Differential.  Procedure                               Abnormality         Status                     ---------                               -----------         ------                     CBC Auto Differential[[id number]]        Normal              Final result                 Please view results for these tests on the individual orders.    Comprehensive Metabolic Panel [[id number]]  (Abnormal) Collected: 07/09/25 1431    Specimen: Blood Updated: 07/09/25 1459     Glucose 110 mg/dL      BUN 6.7 mg/dL      Creatinine 0.92 mg/dL      Sodium 137 mmol/L      Potassium 4.0 mmol/L      Chloride 104 mmol/L      CO2 23.2 mmol/L      Calcium 9.2 mg/dL      Total Protein 6.7 g/dL      Albumin 4.4 g/dL      ALT (SGPT) 14 U/L      AST (SGOT) 14 U/L      Alkaline Phosphatase 51 U/L      Total Bilirubin 0.8 mg/dL      Globulin 2.3 gm/dL      A/G Ratio 1.9  g/dL      BUN/Creatinine Ratio 7.3     Anion Gap 9.8 mmol/L      eGFR 81.9 mL/min/1.73     Narrative:      GFR Categories in Chronic Kidney Disease (CKD)              GFR Category          GFR (mL/min/1.73)    Interpretation  G1                    90 or greater        Normal or high (1)  G2                    60-89                Mild decrease (1)  G3a                   45-59                Mild to moderate decrease  G3b                   30-44                Moderate to severe decrease  G4                    15-29                Severe decrease  G5                    14 or less           Kidney failure    (1)In the absence of evidence of kidney disease, neither GFR category G1 or G2 fulfill the criteria for CKD.    eGFR calculation 2021 CKD-EPI creatinine equation, which does not include race as a factor    Lipase [[id number]]  (Normal) Collected: 07/09/25 1431    Specimen: Blood Updated: 07/09/25 1459     Lipase 26 U/L     hCG, Quantitative, Pregnancy [[id number]] Collected: 07/09/25 1431    Specimen: Blood Updated: 07/09/25 1456     HCG Quantitative <0.50 mIU/mL     Narrative:      HCG Ranges by Gestational Age    Females - non-pregnant premenopausal   </= 1mIU/mL HCG  Females - postmenopausal               </= 7mIU/mL HCG    3 Weeks       5.4   -      72 mIU/mL  4 Weeks      10.2   -     708 mIU/mL  5 Weeks       217   -   8,245 mIU/mL  6 Weeks       152   -  32,177 mIU/mL  7 Weeks     4,059   - 153,767 mIU/mL  8 Weeks    31,366   - 149,094 mIU/mL  9 Weeks    59,109   - 135,901 mIU/mL  10 Weeks   44,186   - 170,409 mIU/mL  12 Weeks   27,107   - 201,615 mIU/mL  14 Weeks   24,302   -  93,646 mIU/mL  15 Weeks   12,540   -  69,747 mIU/mL  16 Weeks    8,904   -  55,332 mIU/mL  17 Weeks    8,240   -  51,793 mIU/mL  18 Weeks    9,649   -  55,271 mIU/mL      CBC Auto Differential [[id number]]  (Normal) Collected: 07/09/25 1431    Specimen: Blood Updated: 07/09/25 1439     WBC 6.79 10*3/mm3      RBC 4.03 10*6/mm3       Hemoglobin 12.5 g/dL      Hematocrit 38.6 %      MCV 95.8 fL      MCH 31.0 pg      MCHC 32.4 g/dL      RDW 12.3 %      RDW-SD 43.6 fl      MPV 10.2 fL      Platelets 229 10*3/mm3      Neutrophil % 46.9 %      Lymphocyte % 36.7 %      Monocyte % 10.6 %      Eosinophil % 4.7 %      Basophil % 1.0 %      Immature Grans % 0.1 %      Neutrophils, Absolute 3.18 10*3/mm3      Lymphocytes, Absolute 2.49 10*3/mm3      Monocytes, Absolute 0.72 10*3/mm3      Eosinophils, Absolute 0.32 10*3/mm3      Basophils, Absolute 0.07 10*3/mm3      Immature Grans, Absolute 0.01 10*3/mm3      nRBC 0.0 /100 WBC     Urinalysis With Microscopic If Indicated (No Culture) - Urine, Clean Catch [[id number]]  (Normal) Collected: 07/09/25 1440    Specimen: Urine, Clean Catch Updated: 07/09/25 1450     Color, UA Yellow     Appearance, UA Clear     pH, UA 6.5     Specific Gravity, UA 1.006     Glucose, UA Negative     Ketones, UA Negative     Bilirubin, UA Negative     Blood, UA Negative     Protein, UA Negative     Leuk Esterase, UA Negative     Nitrite, UA Negative     Urobilinogen, UA 0.2 E.U./dL    Narrative:      Urine microscopic not indicated.             Imaging:    CT Abdomen Pelvis Stone Protocol  Result Date: 7/9/2025  CT ABDOMEN PELVIS STONE PROTOCOL Date of Exam: 7/9/2025 3:07 PM EDT Indication: Left flank pain. Comparison: CT 6/23/2020 Technique: Axial CT images were obtained of the abdomen and pelvis without the administration of contrast. Reconstructed coronal and sagittal images were also obtained. Automated exposure control and iterative construction methods were used. Findings: There is atelectasis at the lung bases. Liver is normal. The gallbladder is normal. Spleen and pancreas demonstrate no acute finding. Adrenal glands appear normal. There is a punctate 2 mm left-sided nephrolith. No obstructive uropathy. No ureteral stone. No bladder stone. Uterus and ovaries appear normal for age. There is mild diverticulosis in the distal  colon without definite diverticulitis. Moderate stool in the colon. Appendix not well visualized but no inflammatory process seen. Stomach and small bowel demonstrate no acute finding. Normal aorta. No enlarged adenopathy. No significant ascites. No acute osseous finding.     Impression: 1.Punctate 2 mm left-sided nephrolith. No obstructive uropathy or ureteral stone. 2.Diverticulosis. Electronically Signed: Juan Antonio Le MD  7/9/2025 3:42 PM EDT  Workstation ID: [id number]        Differential Diagnosis and Discussion:    Back Pain: The patient presents with back pain. My differential diagnosis includes but is not limited to acute spinal epidural abscess, acute spinal epidural bleed, cauda equina syndrome, abdominal aortic aneurysm, aortic dissection, kidney stone, pyelonephritis, musculoskeletal back pain, spinal fracture, and osteoarthritis.     PROCEDURES:    Labs were collected in the emergency department and all labs were reviewed and interpreted by me.  CT scan was performed in the emergency department and the CT scan radiology impression was interpreted by me.    No orders to display       Procedures    MDM  Number of Diagnoses or Management Options  Strain of lumbar region, initial encounter  Diagnosis management comments: The patient's symptoms are consistent with musculoskeletal back pain.  Urine is negative for infection, blood work is reassuring and CT scan is negative for ureterolithiasis.  The patient is now resting comfortably, feels better, is alert, talkative, interactive and in no distress. The repeat examination is unremarkable and benign. The patient is neurologically intact and is ambulatory in the ED. The patient has no fever, no bowel or bladder incontinence, no saddle anesthesia, and is otherwise alert and well appearing. The history, physical exam, and diagnostics (if any) do not suggest the presence of acute spinal epidural abscess, acute spinal epidural bleed, cauda equina syndrome,  abdominal aortic aneurysm, aortic dissection or other process requiring further testing, treatment or consultation in the emergency department. The vital signs have been stable. The patient's condition is stable and appropriate for discharge. The patient will pursue further outpatient evaluation with the primary care physician or other designated for consulting position as indicated in the discharge instructions.       Amount and/or Complexity of Data Reviewed  Clinical lab tests: reviewed and ordered  Tests in the radiology section of CPT®: ordered and reviewed  Review and summarize past medical records: yes  Discuss the patient with other providers: yes  Independent visualization of images, tracings, or specimens: yes    Risk of Complications, Morbidity, and/or Mortality  Presenting problems: low  Diagnostic procedures: moderate  Management options: low    Patient Progress  Patient progress: improved                       Patient Care Considerations:    SEPSIS was considered but is NOT present in the emergency department as SIRS criteria is not present. ANTIBIOTICS: I considered prescribing antibiotics as an outpatient however no bacterial focus of infection was found.      Consultants/Shared Management Plan:    SHARED VISIT: I have discussed the case with my supervising physician, Dr. Onofre who states agrees with plan. The substantive portion of the medical decision was made by the attesting physician who made or approve the management plan and will take responsibility for the patient.  Clinical findings were discussed and ultimate disposition was made in consult with supervising physician.    Social Determinants of Health:    Patient is independent, reliable, and has access to care.       Disposition and Care Coordination:    Discharged: The patient is suitable and stable for discharge with no need for consideration of admission.    I have explained the patient´s condition, diagnoses and treatment plan based  on the information available to me at this time. I have answered questions and addressed any concerns. The patient has a good  understanding of the patient´s diagnosis, condition, and treatment plan as can be expected at this point. The vital signs have been stable. The patient´s condition is stable and appropriate for discharge from the emergency department.      The patient will pursue further outpatient evaluation with the primary care physician or other designated or consulting physician as outlined in the discharge instructions. They are agreeable to this plan of care and follow-up instructions have been explained in detail. The patient has received these instructions in written format and has expressed an understanding of the discharge instructions. The patient is aware that any significant change in condition or worsening of symptoms should prompt an immediate return to this or the closest emergency department or call to 911.  I have explained discharge medications and the need for follow up with the patient/caretakers. This was also printed in the discharge instructions. Patient was discharged with the following medications and follow up:      Medication List        New Prescriptions      lidocaine 5 %  Commonly known as: LIDODERM  Place 1 patch on the skin as directed by provider Daily for 7 days. Remove & Discard patch within 12 hours or as directed by MD     methocarbamol 500 MG tablet  Commonly known as: ROBAXIN  Take 1 tablet by mouth 4 (Four) Times a Day As Needed for Muscle Spasms.            Changed      * methylPREDNISolone 4 MG dose pack  Commonly known as: MEDROL  Take as directed  What changed: Another medication with the same name was added. Make sure you understand how and when to take each.     * methylPREDNISolone 4 MG dose pack  Commonly known as: MEDROL  Take 6 tablets by mouth Daily for 1 day, THEN 5 tablets Daily for 1 day, THEN 4 tablets Daily for 1 day, THEN 3 tablets Daily for 1 day,  THEN 2 tablets Daily for 1 day, THEN 1 tablet Daily for 1 day. Take as directed on package instructions.  Start taking on: July 9, 2025  What changed: You were already taking a medication with the same name, and this prescription was added. Make sure you understand how and when to take each.           * This list has 2 medication(s) that are the same as other medications prescribed for you. Read the directions carefully, and ask your doctor or other care provider to review them with you.                Stop      sulfamethoxazole-trimethoprim 800-160 MG per tablet  Commonly known as: BACTRIM DS,SEPTRA DS               Where to Get Your Medications        These medications were sent to [id number] - [place], [place] - [address] AT [facility] OF [address]/[facility] & [place] - [phone] [facility] [phone]   [address] [place] [address]      Phone: [phone]   lidocaine 5 %  methocarbamol 500 MG tablet  methylPREDNISolone 4 MG dose pack      No follow-up provider specified.     Final diagnoses:   Strain of lumbar region, initial encounter        ED Disposition       ED Disposition   Discharge    Condition   Stable    Comment   --               This medical record created using voice recognition software.             Kristin Solorio PA-C  07/09/25 1640

## 2025-07-09 NOTE — DISCHARGE INSTRUCTIONS
You were evaluated emergency department today.  Your blood work is very reassuring and does not show any signs of infection.  Your urine is clear and does not show any UTI anymore either.  Your CT scan shows a 2 mm stone in one of your kidneys but nothing in the ureters that would cause any back pain.  I am going to treat you for musculoskeletal pain, I sent medications to your pharmacy that you can try, return to ER with new or worsening symptoms.

## 2025-08-14 ENCOUNTER — TELEPHONE (OUTPATIENT)
Dept: INTERNAL MEDICINE | Facility: CLINIC | Age: 40
End: 2025-08-14
Payer: COMMERCIAL

## 2025-08-24 ENCOUNTER — HOSPITAL ENCOUNTER (EMERGENCY)
Facility: HOSPITAL | Age: 40
Discharge: HOME | End: 2025-08-24
Attending: EMERGENCY MEDICINE
Payer: COMMERCIAL

## 2025-08-24 VITALS
BODY MASS INDEX: 25.93 KG/M2 | DIASTOLIC BLOOD PRESSURE: 68 MMHG | TEMPERATURE: 98.4 F | SYSTOLIC BLOOD PRESSURE: 103 MMHG | WEIGHT: 155.65 LBS | OXYGEN SATURATION: 99 % | RESPIRATION RATE: 16 BRPM | HEIGHT: 65 IN | HEART RATE: 61 BPM

## 2025-08-24 DIAGNOSIS — J06.9 VIRAL URI WITH COUGH: Primary | ICD-10-CM

## 2025-08-24 DIAGNOSIS — B97.89 SORE THROAT (VIRAL): ICD-10-CM

## 2025-08-24 DIAGNOSIS — J02.8 SORE THROAT (VIRAL): ICD-10-CM

## 2025-08-24 LAB
FLUAV RNA RESP QL NAA+PROBE: NOT DETECTED
FLUBV RNA NPH QL NAA+NON-PROBE: NOT DETECTED
RSV RNA RESP QL NAA+PROBE: NOT DETECTED
S PYO AG THROAT QL: NEGATIVE
SARS-COV-2 RNA RESP QL NAA+PROBE: NOT DETECTED

## 2025-08-24 PROCEDURE — 87880 STREP A ASSAY W/OPTIC: CPT | Performed by: EMERGENCY MEDICINE

## 2025-08-24 PROCEDURE — 87081 CULTURE SCREEN ONLY: CPT | Performed by: EMERGENCY MEDICINE

## 2025-08-24 PROCEDURE — 87637 SARSCOV2&INF A&B&RSV AMP PRB: CPT | Performed by: EMERGENCY MEDICINE

## 2025-08-24 RX ORDER — BROMPHENIRAMINE MALEATE, PSEUDOEPHEDRINE HYDROCHLORIDE, AND DEXTROMETHORPHAN HYDROBROMIDE 2; 30; 10 MG/5ML; MG/5ML; MG/5ML
5 SYRUP ORAL 4 TIMES DAILY PRN
Qty: 118 ML | Refills: 0 | Status: SHIPPED | OUTPATIENT
Start: 2025-08-24 | End: 2025-09-01

## 2025-08-26 LAB — BACTERIA SPEC AEROBE CULT: NORMAL

## (undated) DEVICE — ELECTRD BLD EDGE COAT 3IN

## (undated) DEVICE — BLCK/BITE BLOX WO/DENTL/RIM W/STRAP 54F

## (undated) DEVICE — GOWN,REINFORCE,POLY,SIRUS,BREATH SLV,XLG: Brand: MEDLINE

## (undated) DEVICE — SINGLE-USE BIOPSY FORCEPS: Brand: RADIAL JAW 4

## (undated) DEVICE — CONN JET HYDRA H20 AUXILIARY DISP

## (undated) DEVICE — MAJOR-LF: Brand: MEDLINE INDUSTRIES, INC.

## (undated) DEVICE — SUT PERMAHAND SILK 0 FSL 30IN BLK

## (undated) DEVICE — DRSNG SURG AQUACEL AG/ADVNTGE 9X15CM 3.5X6IN

## (undated) DEVICE — STRIP CLS WND SUTURESTRIP/PLS 0.5X4IN TP1103

## (undated) DEVICE — STERILE POLYISOPRENE POWDER-FREE SURGICAL GLOVES WITH EMOLLIENT COATING: Brand: PROTEXIS

## (undated) DEVICE — SLV SCD KN/LEN ADJ EXPRSS BLENDED MD 1P/U

## (undated) DEVICE — SOLIDIFIER LIQLOC PLS 1500CC BT

## (undated) DEVICE — CONTAINER,SPECIMEN,O.R.STRL,4.5OZ: Brand: MEDLINE

## (undated) DEVICE — ADHS LIQ MASTISOL 2/3ML

## (undated) DEVICE — BRA COMPR SURG STL119 V/CLOSE/FRNT SZLG WHT

## (undated) DEVICE — Device

## (undated) DEVICE — LINER SURG CANSTR SXN S/RIGD 1500CC

## (undated) DEVICE — INTENDED FOR TISSUE SEPARATION, AND OTHER PROCEDURES THAT REQUIRE A SHARP SURGICAL BLADE TO PUNCTURE OR CUT.: Brand: BARD-PARKER ® CARBON RIB-BACK BLADES

## (undated) DEVICE — SOL IRRG H2O PL/BG 1000ML STRL

## (undated) DEVICE — Device: Brand: DEFENDO AIR/WATER/SUCTION AND BIOPSY VALVE

## (undated) DEVICE — SUT VIC 3/0 SH 27IN J416H